# Patient Record
Sex: MALE | Race: WHITE | NOT HISPANIC OR LATINO | Employment: OTHER | URBAN - METROPOLITAN AREA
[De-identification: names, ages, dates, MRNs, and addresses within clinical notes are randomized per-mention and may not be internally consistent; named-entity substitution may affect disease eponyms.]

---

## 2017-01-06 ENCOUNTER — OFFICE VISIT (OUTPATIENT)
Dept: FAMILY MEDICINE CLINIC | Facility: CLINIC | Age: 77
End: 2017-01-06

## 2017-01-06 VITALS
DIASTOLIC BLOOD PRESSURE: 78 MMHG | OXYGEN SATURATION: 97 % | WEIGHT: 209 LBS | HEIGHT: 69 IN | SYSTOLIC BLOOD PRESSURE: 142 MMHG | HEART RATE: 71 BPM | BODY MASS INDEX: 30.96 KG/M2

## 2017-01-06 DIAGNOSIS — N40.1 BENIGN PROSTATIC HYPERPLASIA WITH LOWER URINARY TRACT SYMPTOMS, UNSPECIFIED MORPHOLOGY: ICD-10-CM

## 2017-01-06 DIAGNOSIS — I10 BENIGN ESSENTIAL HTN: Primary | ICD-10-CM

## 2017-01-06 PROCEDURE — 99213 OFFICE O/P EST LOW 20 MIN: CPT | Performed by: FAMILY MEDICINE

## 2017-01-06 RX ORDER — AMLODIPINE BESYLATE AND BENAZEPRIL HYDROCHLORIDE 10; 20 MG/1; MG/1
1 CAPSULE ORAL DAILY
Qty: 90 CAPSULE | Refills: 1 | Status: SHIPPED | OUTPATIENT
Start: 2017-01-06 | End: 2017-07-13 | Stop reason: SDUPTHER

## 2017-01-06 RX ORDER — CHLORTHALIDONE 25 MG/1
25 TABLET ORAL DAILY
Qty: 90 TABLET | Refills: 1 | Status: SHIPPED | OUTPATIENT
Start: 2017-01-06 | End: 2017-07-13 | Stop reason: SDUPTHER

## 2017-01-06 RX ORDER — FINASTERIDE 5 MG/1
5 TABLET, FILM COATED ORAL DAILY
Qty: 90 TABLET | Refills: 1 | Status: SHIPPED | OUTPATIENT
Start: 2017-01-06 | End: 2017-07-13 | Stop reason: SDUPTHER

## 2017-01-06 NOTE — MR AVS SNAPSHOT
Imer Jordan   1/6/2017 9:20 AM   Office Visit    Provider:  Ann Marie Brizuela MD   Department:  [facility] PRIMARY CARE   Dept Phone:  [phone]                Your Full Care Plan              Today's Medication Changes          These changes are accurate as of: 1/6/17  9:31 AM.  If you have any questions, ask your nurse or doctor.               Medication(s)that have changed:     chlorthalidone 25 MG tablet   Commonly known as:  HYGROTON   Take 1 tablet by mouth Daily.   What changed:  See the new instructions.   Changed by:  Ann Marie Brizuela MD       finasteride 5 MG tablet   Commonly known as:  PROSCAR   Take 1 tablet by mouth Daily.   What changed:  See the new instructions.   Changed by:  Ann Marie Brizuela MD         Stop taking medication(s)listed here:     CIALIS 20 MG tablet   Generic drug:  tadalafil   Stopped by:  Ann Marie Brizuela MD                Where to Get Your Medications      These medications were sent to [facility] [address] [facility] [phone] [facility] [phone]   [address] [place] [address]     Phone:  [phone]     chlorthalidone 25 MG tablet    finasteride 5 MG tablet         These medications were sent to [address] - [phone] [facility] [phone] [place] [address]     Phone:  [phone]     amLODIPine-benazepril 10-20 MG per capsule                  Your Updated Medication List          This list is accurate as of: 1/6/17  9:31 AM.  Always use your most recent med list.                amLODIPine-benazepril 10-20 MG per capsule   Commonly known as:  LOTREL   Take 1 capsule by mouth Daily.       aspirin 81 MG chewable tablet       betamethasone dipropionate 0.05 % lotion   Commonly known as:  DIPROLENE       chlorthalidone 25 MG tablet   Commonly known as:  HYGROTON   Take 1 tablet by  mouth Daily.       finasteride 5 MG tablet   Commonly known as:  PROSCAR   Take 1 tablet by mouth Daily.       GLUCOSAMINE MSM COMPLEX tablet       mometasone 0.1 % cream   Commonly known as:  ELOCON   Apply topically daily.       Omega-3 Krill Oil 1000 MG capsule               You Were Diagnosed With        Codes Comments    Benign essential HTN    -  Primary ICD-10-CM: I10  ICD-9-CM: 401.1     Benign prostatic hyperplasia with lower urinary tract symptoms, unspecified morphology     ICD-10-CM: N40.1  ICD-9-CM: 600.01       Instructions     None    Patient Instructions History      MyChart Signup     Our records indicate that you have an active [facility] account.    You can view your After Visit Summary by going to [employer] and logging in with your [employer]a username and password.  If you don't have a [employer]a username and password but a parent or guardian has access to your record, the parent or guardian should login with their own Arynga username and password and access your record to view the After Visit Summary.    If you have questions, you can email [email or web address] or call [phone] to talk to our [employer]ry[employer] staff.  Remember, [employer] is NOT to be used for urgent needs.  For medical emergencies, dial 911.               Other Info from Your Visit           Your Appointments     Jul 10, 2017  8:00 AM EDT   [facility] PR[facility] (--)    [address] [facility] [id number] [phone]            Jul 10, 2017  8:15 AM EDT   Office Visit with Ann Marie Brizuela MD   [facility] PRIMARY CARE (--)    [address] [facility] [id number] [phone]           Arrive 15 minutes prior to appointment.              Allergies     Niacin      Spironolactone  Nausea Only    Other reaction(s): Headache      Reason for Visit     Hypertension 6 month f/u    Med Refill           Vital Signs     Blood  "Pressure Pulse Height Weight Oxygen Saturation Body Mass Index    142/78 71 69\" (175.3 cm) 209 lb (94.8 kg) 97% 30.86 kg/m2    Smoking Status                   Current Some Day Smoker           Problems and Diagnoses Noted     Benign essential HTN    Benign enlargement of prostate      No Longer an Issue     Aneurysm      "

## 2017-01-06 NOTE — PROGRESS NOTES
Vitals:    01/06/17 0903   BP: 142/78   Pulse: 71   SpO2: 97%   Body mass index is 30.86 kg/(m^2).    Social History   Substance Use Topics   • Smoking status: Current Some Day Smoker     Types: Cigars   • Smokeless tobacco: None      Comment: occ   • Alcohol use None       Subjective   Imer Jordan is a 76 y.o. male  is here for follow-up of hypertension. He is exercising walking and using the elliptical and is adherent to a low-salt diet. Patient does not check his blood pressure.Patient denies chest pain and dyspnea. Cardiovascular risk factors: advanced age (older than 55 for men, 65 for women), hypertension, male gender, obesity (BMI >= 30 kg/m2) and sedentary lifestyle. Use of agents associated with hypertension: none. History of target organ damage: none and but has a thoracic aneurysm. He is  compliant with meds.     History of Present Illness     The following portions of the patient's history were reviewed and updated as appropriate: allergies, current medications, past social history and problem list.    Review of Systems   Constitutional: Negative for activity change, appetite change, chills, fatigue, fever and unexpected weight change.   HENT: Negative for congestion, ear pain, hearing loss, mouth sores, nosebleeds, rhinorrhea and sore throat.    Eyes: Negative for pain and visual disturbance.   Respiratory: Negative for cough, shortness of breath and wheezing.    Cardiovascular: Negative for chest pain, palpitations and leg swelling.   Gastrointestinal: Negative for abdominal distention, abdominal pain, blood in stool, constipation, diarrhea, nausea and vomiting.   Endocrine: Negative for cold intolerance and heat intolerance.   Genitourinary: Negative for difficulty urinating, discharge, dysuria, frequency, hematuria and urgency.   Musculoskeletal: Negative for back pain and joint swelling.   Skin: Negative for rash and wound.   Neurological: Negative for dizziness, weakness, numbness and headaches.    Hematological: Does not bruise/bleed easily.   Psychiatric/Behavioral: Negative for confusion, dysphoric mood, sleep disturbance and suicidal ideas. The patient is not nervous/anxious.        Objective   Physical Exam   Constitutional: He is oriented to person, place, and time. Vital signs are normal. He appears well-developed and well-nourished. No distress.   HENT:   Head: Normocephalic.   Cardiovascular: Normal rate and regular rhythm.    Murmur heard.   Decrescendo systolic murmur is present with a grade of 5/6   Over the entire precordium   Pulmonary/Chest: Effort normal and breath sounds normal.   Neurological: He is alert and oriented to person, place, and time. Gait normal.   Skin:        Psychiatric: He has a normal mood and affect. His behavior is normal. Judgment and thought content normal.   Vitals reviewed.      Assessment/Plan   Problem List Items Addressed This Visit        Cardiovascular and Mediastinum    Benign essential HTN - Primary     Hypertension is borderline. He should monitor his BP.          Relevant Medications    amLODIPine-benazepril (LOTREL) 10-20 MG per capsule    chlorthalidone (HYGROTON) 25 MG tablet       Genitourinary    Benign prostatic hypertrophy    Relevant Medications    finasteride (PROSCAR) 5 MG tablet        Immunizations are updated today with a Shingle vaccine, referred to retail clinic, also tdap at the pharmacy.

## 2017-04-27 RX ORDER — BETAMETHASONE DIPROPIONATE 0.5 MG/G
LOTION TOPICAL
Qty: 60 ML | Refills: 2 | Status: SHIPPED | OUTPATIENT
Start: 2017-04-27 | End: 2019-01-18 | Stop reason: SDUPTHER

## 2017-07-13 ENCOUNTER — OFFICE VISIT (OUTPATIENT)
Dept: FAMILY MEDICINE CLINIC | Facility: CLINIC | Age: 77
End: 2017-07-13

## 2017-07-13 VITALS
HEART RATE: 60 BPM | BODY MASS INDEX: 30.66 KG/M2 | HEIGHT: 69 IN | DIASTOLIC BLOOD PRESSURE: 64 MMHG | OXYGEN SATURATION: 94 % | SYSTOLIC BLOOD PRESSURE: 138 MMHG | WEIGHT: 207 LBS

## 2017-07-13 DIAGNOSIS — Z79.899 HIGH RISK MEDICATION USE: ICD-10-CM

## 2017-07-13 DIAGNOSIS — I10 BENIGN ESSENTIAL HTN: Primary | ICD-10-CM

## 2017-07-13 DIAGNOSIS — R73.9 HYPERGLYCEMIA: ICD-10-CM

## 2017-07-13 DIAGNOSIS — N40.1 BENIGN PROSTATIC HYPERPLASIA WITH LOWER URINARY TRACT SYMPTOMS, UNSPECIFIED MORPHOLOGY: ICD-10-CM

## 2017-07-13 DIAGNOSIS — E78.2 MIXED HYPERLIPIDEMIA: ICD-10-CM

## 2017-07-13 LAB
ALBUMIN SERPL-MCNC: 4.5 G/DL (ref 3.5–5.2)
ALBUMIN/GLOB SERPL: 1.8 G/DL
ALP SERPL-CCNC: 60 U/L (ref 39–117)
ALT SERPL-CCNC: 22 U/L (ref 1–41)
AST SERPL-CCNC: 27 U/L (ref 1–40)
BASOPHILS # BLD AUTO: 0.01 10*3/MM3 (ref 0–0.2)
BASOPHILS NFR BLD AUTO: 0.2 % (ref 0–1.5)
BILIRUB SERPL-MCNC: 0.4 MG/DL (ref 0.1–1.2)
BUN SERPL-MCNC: 21 MG/DL (ref 8–23)
BUN/CREAT SERPL: 17.4 (ref 7–25)
CALCIUM SERPL-MCNC: 9.2 MG/DL (ref 8.6–10.5)
CHLORIDE SERPL-SCNC: 100 MMOL/L (ref 98–107)
CHOLEST SERPL-MCNC: 186 MG/DL (ref 0–200)
CO2 SERPL-SCNC: 25.7 MMOL/L (ref 22–29)
CREAT SERPL-MCNC: 1.21 MG/DL (ref 0.76–1.27)
EOSINOPHIL # BLD AUTO: 0.09 10*3/MM3 (ref 0–0.7)
EOSINOPHIL NFR BLD AUTO: 1.6 % (ref 0.3–6.2)
ERYTHROCYTE [DISTWIDTH] IN BLOOD BY AUTOMATED COUNT: 13.7 % (ref 11.5–14.5)
GLOBULIN SER CALC-MCNC: 2.5 GM/DL
GLUCOSE SERPL-MCNC: 106 MG/DL (ref 65–99)
HBA1C MFR BLD: 5.63 % (ref 4.8–5.6)
HCT VFR BLD AUTO: 44.8 % (ref 40.4–52.2)
HDLC SERPL-MCNC: 32 MG/DL (ref 40–60)
HGB BLD-MCNC: 14.8 G/DL (ref 13.7–17.6)
IMM GRANULOCYTES # BLD: 0.02 10*3/MM3 (ref 0–0.03)
IMM GRANULOCYTES NFR BLD: 0.4 % (ref 0–0.5)
LDLC SERPL CALC-MCNC: 130 MG/DL (ref 0–100)
LDLC/HDLC SERPL: 4.05 {RATIO}
LYMPHOCYTES # BLD AUTO: 1.27 10*3/MM3 (ref 0.9–4.8)
LYMPHOCYTES NFR BLD AUTO: 23 % (ref 19.6–45.3)
MCH RBC QN AUTO: 29.4 PG (ref 27–32.7)
MCHC RBC AUTO-ENTMCNC: 33 G/DL (ref 32.6–36.4)
MCV RBC AUTO: 88.9 FL (ref 79.8–96.2)
MONOCYTES # BLD AUTO: 0.45 10*3/MM3 (ref 0.2–1.2)
MONOCYTES NFR BLD AUTO: 8.1 % (ref 5–12)
NEUTROPHILS # BLD AUTO: 3.69 10*3/MM3 (ref 1.9–8.1)
NEUTROPHILS NFR BLD AUTO: 66.7 % (ref 42.7–76)
PLATELET # BLD AUTO: 173 10*3/MM3 (ref 140–500)
POTASSIUM SERPL-SCNC: 3.8 MMOL/L (ref 3.5–5.2)
PROT SERPL-MCNC: 7 G/DL (ref 6–8.5)
PSA SERPL-MCNC: 0.83 NG/ML (ref 0–4)
RBC # BLD AUTO: 5.04 10*6/MM3 (ref 4.6–6)
SODIUM SERPL-SCNC: 143 MMOL/L (ref 136–145)
TRIGL SERPL-MCNC: 122 MG/DL (ref 0–150)
VLDLC SERPL CALC-MCNC: 24.4 MG/DL (ref 5–40)
WBC # BLD AUTO: 5.53 10*3/MM3 (ref 4.5–10.7)

## 2017-07-13 PROCEDURE — 99214 OFFICE O/P EST MOD 30 MIN: CPT | Performed by: FAMILY MEDICINE

## 2017-07-13 RX ORDER — CHLORTHALIDONE 25 MG/1
25 TABLET ORAL DAILY
Qty: 90 TABLET | Refills: 1 | Status: SHIPPED | OUTPATIENT
Start: 2017-07-13 | End: 2018-01-04 | Stop reason: SDUPTHER

## 2017-07-13 RX ORDER — AMLODIPINE BESYLATE AND BENAZEPRIL HYDROCHLORIDE 10; 20 MG/1; MG/1
1 CAPSULE ORAL DAILY
Qty: 90 CAPSULE | Refills: 1 | Status: SHIPPED | OUTPATIENT
Start: 2017-07-13 | End: 2018-01-08 | Stop reason: SDUPTHER

## 2017-07-13 RX ORDER — FINASTERIDE 5 MG/1
5 TABLET, FILM COATED ORAL DAILY
Qty: 90 TABLET | Refills: 1 | Status: SHIPPED | OUTPATIENT
Start: 2017-07-13 | End: 2018-01-04 | Stop reason: SDUPTHER

## 2017-07-13 NOTE — PROGRESS NOTES
Imer Jordan is a 76 y.o. male.  Seen 07/13/2017    Assessment/Plan   Problem List Items Addressed This Visit        Cardiovascular and Mediastinum    Benign essential HTN - Primary    Overview     [facility] 7/13/2017  BP is controlled well. He will recheck in six months. He will continue present meds.           Relevant Medications    amLODIPine-benazepril (LOTREL) 10-20 MG per capsule    chlorthalidone (HYGROTON) 25 MG tablet    Other Relevant Orders    Comprehensive Metabolic Panel    HLD (hyperlipidemia)    Overview     [facility] 7/13/2017  Labs are drawn today.           Relevant Orders    Lipid Panel With LDL / HDL Ratio       Genitourinary    Benign prostatic hypertrophy    Relevant Medications    finasteride (PROSCAR) 5 MG tablet    Other Relevant Orders    PSA       Other    Hyperglycemia    Overview     [facility] 7/13/2017  Labs are drawn today.           Relevant Orders    Hemoglobin A1c      Other Visit Diagnoses     High risk medication use        Relevant Orders    CBC & Differential             Return in about 6 months (around 1/13/2018).  Patient Instructions   Mediterranean diet: A heart-healthy eating plan  The heart-healthy Mediterranean diet is a healthy eating plan based on typical foods and recipes of Mediterranean-style cooking. Here's how to adopt the Mediterranean diet.  By [facility] Staff  If you're looking for a heart-healthy eating plan, the Mediterranean diet might be right for you.    The Mediterranean diet incorporates the basics of healthy eating -- plus a splash of flavorful olive oil and perhaps a glass of red wine -- among other components characterizing the traditional cooking style of countries bordering the [place] Sea.Most healthy diets include fruits, vegetables, fish and whole grains, and limit unhealthy fats. While these parts of a healthy diet are tried-and-true, subtle variations or differences in proportions of certain foods may make a difference in your risk of  "heart disease.    Benefits of the Mediterranean diet  Research has shown that the traditional Mediterranean diet reduces the risk of heart disease. The diet has been associated with a lower level of oxidized low-density lipoprotein (LDL) cholesterol -- the \"bad\" cholesterol that's more likely to build up deposits in your arteries. In fact, a meta-analysis of more than 1.5 million healthy adults demonstrated that following a Mediterranean diet was associated with a reduced risk of cardiovascular mortality as well as overall mortality.    The Mediterranean diet is also associated with a reduced incidence of cancer, and Parkinson's and Alzheimer's diseases. Women who eat a Mediterranean diet supplemented with extra-virgin olive oil and mixed nuts may have a reduced risk of breast cancer.For these reasons, most if not all major scientific organizations encourage healthy adults to adapt a style of eating like that of the Mediterranean diet for prevention of major chronic diseases.        Vitals:    07/13/17 0835   BP: 138/64   Pulse: 60   SpO2: 94%   Weight: 207 lb (93.9 kg)   Height: 69\" (175.3 cm)     Body mass index is 30.57 kg/(m^2).    Subjective     Chief Complaint   Patient presents with   • Hypertension   • Med Refill     Social History   Substance Use Topics   • Smoking status: Current Some Day Smoker     Types: Cigars   • Smokeless tobacco: Never Used      Comment: occ   • Alcohol use None       History of Present Illness     When he lays on his right side he can now here his regurg. Started in December. Patient is here for follow-up of hypertension. He is exercising-walking a mile a day and does his elliptical and is adherent to a low-salt diet. Patient does BP checks at home: It is well controlled when checked. and home BP readings are in the 120's/60's range. Patient denies chest pain and dyspnea. Cardiovascular risk factors: advanced age (older than 55 for men, 65 for women), dyslipidemia, hypertension, " male gender and aortic valve insuff. Use of agents associated with hypertension: none. History of target organ damage: none. He is compliant with meds.     The following portions of the patient's history were reviewed and updated as appropriate:PMHroutine: Social history , Allergies, Current Medications, Active Problem List and Health Maintenance    Review of Systems   Constitutional: Negative for activity change, appetite change, chills, fatigue, fever and unexpected weight change.   HENT: Negative for congestion, ear pain, hearing loss, mouth sores, nosebleeds, rhinorrhea and sore throat.    Eyes: Negative for pain and visual disturbance.   Respiratory: Negative for cough, shortness of breath and wheezing.    Cardiovascular: Negative for chest pain, palpitations and leg swelling.   Gastrointestinal: Negative for abdominal distention, abdominal pain, blood in stool, constipation, diarrhea, nausea and vomiting.   Endocrine: Negative for cold intolerance and heat intolerance.   Genitourinary: Negative for difficulty urinating, discharge, dysuria, frequency, hematuria and urgency.   Musculoskeletal: Negative for back pain and joint swelling.   Skin: Negative for rash and wound.   Neurological: Negative for dizziness, weakness, numbness and headaches.   Hematological: Does not bruise/bleed easily.   Psychiatric/Behavioral: Negative for confusion, dysphoric mood, sleep disturbance and suicidal ideas. The patient is not nervous/anxious.        Objective   Physical Exam   Constitutional: He is oriented to person, place, and time. Vital signs are normal. He appears well-developed and well-nourished. No distress.   HENT:   Head: Normocephalic.   Cardiovascular: Normal rate and regular rhythm.    Murmur (5/6 holosystolic musical murmur loudest in the LUSB) heard.  Pulmonary/Chest: Effort normal and breath sounds normal.   Neurological: He is alert and oriented to person, place, and time. Gait normal.   Psychiatric: He has a  normal mood and affect. His behavior is normal. Judgment and thought content normal.   Vitals reviewed.    Reviewed Data: Will get labs today Disc last years.   No visits with results within 1 Month(s) from this visit.  Latest known visit with results is:    Office Visit on 07/07/2016   Component Date Value Ref Range Status   • Glucose 07/07/2016 101* 65 - 99 mg/dL Final    Comment: The MDRD GFR formula is only valid for adults with stable  renal function between ages 18 and 70.     • BUN 07/07/2016 16  8 - 23 mg/dL Final   • Creatinine 07/07/2016 1.14  0.76 - 1.27 mg/dL Final   • eGFR Non  Am 07/07/2016 63  >60 mL/min/1.73 Final   • eGFR African Am 07/07/2016 76  >60 mL/min/1.73 Final   • BUN/Creatinine Ratio 07/07/2016 14.0  7.0 - 25.0 Final   • Sodium 07/07/2016 140  136 - 145 mmol/L Final   • Potassium 07/07/2016 4.3  3.5 - 5.2 mmol/L Final   • Chloride 07/07/2016 99  98 - 107 mmol/L Final   • Total CO2 07/07/2016 28.7  22.0 - 29.0 mmol/L Final   • Calcium 07/07/2016 9.6  8.6 - 10.5 mg/dL Final   • Total Protein 07/07/2016 6.5  6.0 - 8.5 g/dL Final   • Albumin 07/07/2016 4.70  3.50 - 5.20 g/dL Final   • Globulin 07/07/2016 1.8  gm/dL Final   • A/G Ratio 07/07/2016 2.6  g/dL Final   • Total Bilirubin 07/07/2016 0.4  0.1 - 1.2 mg/dL Final   • Alkaline Phosphatase 07/07/2016 62  39 - 117 U/L Final   • AST (SGOT) 07/07/2016 23  1 - 40 U/L Final   • ALT (SGPT) 07/07/2016 24  1 - 41 U/L Final   • WBC 07/07/2016 5.68  4.50 - 10.70 10*3/mm3 Final   • RBC 07/07/2016 5.14  4.60 - 6.00 10*6/mm3 Final   • Hemoglobin 07/07/2016 14.8  13.7 - 17.6 g/dL Final   • Hematocrit 07/07/2016 44.4  40.4 - 52.2 % Final   • MCV 07/07/2016 86.4  79.8 - 96.2 fL Final   • MCH 07/07/2016 28.8  27.0 - 32.7 pg Final   • MCHC 07/07/2016 33.3  32.6 - 36.4 g/dL Final   • RDW 07/07/2016 13.4  11.5 - 14.5 % Final   • Platelets 07/07/2016 198  140 - 500 10*3/mm3 Final   • Neutrophil Rel % 07/07/2016 66.6  42.7 - 76.0 % Final   • Lymphocyte  Rel % 07/07/2016 21.0  19.6 - 45.3 % Final   • Monocyte Rel % 07/07/2016 10.6  5.0 - 12.0 % Final   • Eosinophil Rel % 07/07/2016 1.4  0.3 - 6.2 % Final   • Basophil Rel % 07/07/2016 0.4  0.0 - 1.5 % Final   • Neutrophils Absolute 07/07/2016 3.79  1.90 - 8.10 10*3/mm3 Final   • Lymphocytes Absolute 07/07/2016 1.19  0.90 - 4.80 10*3/mm3 Final   • Monocytes Absolute 07/07/2016 0.60  0.20 - 1.20 10*3/mm3 Final   • Eosinophils Absolute 07/07/2016 0.08  0.00 - 0.70 10*3/mm3 Final   • Basophils Absolute 07/07/2016 0.02  0.00 - 0.20 10*3/mm3 Final   • Immature Granulocyte Rel % 07/07/2016 0.0  0.0 - 0.5 % Final   • Immature Grans Absolute 07/07/2016 0.00  0.00 - 0.03 10*3/mm3 Final   • Total Cholesterol 07/07/2016 187  0 - 200 mg/dL Final   • Triglycerides 07/07/2016 187* 0 - 150 mg/dL Final   • HDL Cholesterol 07/07/2016 27* 40 - 60 mg/dL Final   • VLDL Cholesterol 07/07/2016 37.4  5 - 40 mg/dL Final   • LDL Cholesterol  07/07/2016 123* 0 - 100 mg/dL Final   • LDL/HDL Ratio 07/07/2016 4.54   Final

## 2017-07-13 NOTE — PATIENT INSTRUCTIONS
"Mediterranean diet: A heart-healthy eating plan  The heart-healthy Mediterranean diet is a healthy eating plan based on typical foods and recipes of Mediterranean-style cooking. Here's how to adopt the Mediterranean diet.  By [facility] Staff  If you're looking for a heart-healthy eating plan, the Mediterranean diet might be right for you.    The Mediterranean diet incorporates the basics of healthy eating -- plus a splash of flavorful olive oil and perhaps a glass of red wine -- among other components characterizing the traditional cooking style of countries bordering the [place] Sea.Most healthy diets include fruits, vegetables, fish and whole grains, and limit unhealthy fats. While these parts of a healthy diet are tried-and-true, subtle variations or differences in proportions of certain foods may make a difference in your risk of heart disease.    Benefits of the Mediterranean diet  Research has shown that the traditional Mediterranean diet reduces the risk of heart disease. The diet has been associated with a lower level of oxidized low-density lipoprotein (LDL) cholesterol -- the \"bad\" cholesterol that's more likely to build up deposits in your arteries. In fact, a meta-analysis of more than 1.5 million healthy adults demonstrated that following a Mediterranean diet was associated with a reduced risk of cardiovascular mortality as well as overall mortality.    The Mediterranean diet is also associated with a reduced incidence of cancer, and Parkinson's and Alzheimer's diseases. Women who eat a Mediterranean diet supplemented with extra-virgin olive oil and mixed nuts may have a reduced risk of breast cancer.For these reasons, most if not all major scientific organizations encourage healthy adults to adapt a style of eating like that of the Mediterranean diet for prevention of major chronic diseases.    Key components of the Mediterranean diet    The Mediterranean diet emphasizes:     Eating primarily " "plant-based foods, such as fruits and vegetables, whole grains, legumes and nuts   Replacing butter with healthy fats such as olive oil and canola oil   Using herbs and spices instead of salt to flavor foods   Limiting red meat to no more than a few times a month   Eating fish and poultry at least twice a week   Enjoying meals with family and friends   Drinking red wine in moderation (optional)   Getting plenty of exercise   Fruits, vegetables, nuts and grains    The Mediterranean diet traditionally includes fruits, vegetables, pasta and rice. For example, residents of [facility] eat very little red meat and average nine servings a day of antioxidant-rich fruits and vegetables.  Grains in the Mediterranean region are typically whole grain and usually contain very few unhealthy trans fats, and bread is an important part of the diet there. However, throughout the Mediterranean region, bread is eaten plain or dipped in olive oil -- not eaten with butter or margarines, which contain saturated or trans fats.  Nuts are another part of a healthy Mediterranean diet. Nuts are high in fat (approximately 80 percent of their calories come from fat), but most of the fat is not saturated. Because nuts are high in calories, they should not be eaten in large amounts -- generally no more than a handful a day. Avoid candied or honey-roasted and heavily salted nuts.    Healthy fats  The focus of the Mediterranean diet isn't on limiting total fat consumption, but rather to make maradiaga choices about the types of fat you eat. The Mediterranean diet discourages saturated fats and hydrogenated oils (trans fats), both of which contribute to heart disease.The Mediterranean diet features olive oil as the primary source of fat. Olive oil provides monounsaturated fat -- a type of fat that can help reduce LDL cholesterol levels when used in place of saturated or trans fats.\"Extra-virgin\" and \"virgin\" olive oils -- the least processed forms -- also " contain the highest levels of the protective plant compounds that provide antioxidant effects.    Monounsaturated fats and polyunsaturated fats, such as canola oil and some nuts, contain the beneficial linolenic acid (a type of omega-3 fatty acid). Omega-3 fatty acids lower triglycerides, decrease blood clotting, are associated with decreased sudden heart attack, improve the health of your blood vessels, and help moderate blood pressure. Fatty fish -- such as mackerel, lake trout, herring, sardines, albacore tuna and salmon -- are rich sources of omega-3 fatty acids. Fish is eaten on a regular basis in the Mediterranean diet.    Wine    The health effects of alcohol have been debated for many years, and some doctors are reluctant to encourage alcohol consumption because of the health consequences of excessive drinking.However, alcohol -- in moderation -- has been associated with a reduced risk of heart disease in some research studies.    The Mediterranean diet typically includes a moderate amount of wine. This means no more than 5 ounces (148 milliliters) of wine daily for women (or men over age 65), and no more than 10 ounces (296 milliliters) of wine daily for men under age 65. If you're unable to limit your alcohol intake to the amounts defined above, if you have a personal or family history of alcohol abuse, or if you have heart or liver disease, refrain from drinking wine or any other alcohol.    Putting it all together    The Mediterranean diet is a delicious and healthy way to eat. Many people who switch to this style of eating say they'll never eat any other way. Here are some specific steps to get you started:    Eat your veggies and fruits -- and switch to whole grains. An abundance and variety of plant foods should make up the majority of your meals. Strive for seven to 10 servings a day of veggies and fruits. Switch to whole-grain bread and cereal, and begin to eat more whole-gain rice and pasta  products.  Go nuts. Keep almonds, cashews, pistachios and walnuts on hand for a quick snack. Choose natural peanut butter, rather than the kind with hydrogenated fat added. Try tahini (blended sesame seeds) as a dip or spread for bread.  Pass on the butter. Try olive or canola oil as a healthy replacement for butter or margarine. Use it in cooking. Dip bread in flavored olive oil or lightly spread it on whole-grain bread for a tasty alternative to butter. Or try tahini as a dip or spread.  Spice it up. Herbs and spices make food tasty and are also rich in health-promoting substances. Season your meals with herbs and spices rather than salt.  Go fish. Eat fish once or twice a week. Fresh or water-packed tuna, salmon, trout, mackerel and herring are healthy choices. Grilled fish tastes good and requires little cleanup. Avoid fried fish, unless it's sauteed in a small amount of canola oil.  Rein in the red meat. Substitute fish and poultry for red meat. When eaten, make sure it's lean and keep portions small (about the size of a deck of cards). Also avoid sausage, nolan and other high-fat meats.  Choose low-fat dairy. Limit higher fat dairy products such as whole or 2 percent milk, cheese and ice cream. Switch to skim milk, fat-free yogurt and low-fat cheese.  Raise a glass to healthy eating. If it's OK with your doctor, have a glass of wine at dinner. If you don't drink alcohol, you don't need to start. Drinking purple grape juice may be an alternative to wine.

## 2018-01-04 DIAGNOSIS — I10 BENIGN ESSENTIAL HTN: ICD-10-CM

## 2018-01-04 DIAGNOSIS — N40.1 BENIGN PROSTATIC HYPERPLASIA WITH LOWER URINARY TRACT SYMPTOMS: ICD-10-CM

## 2018-01-04 RX ORDER — FINASTERIDE 5 MG/1
TABLET, FILM COATED ORAL
Qty: 90 TABLET | Refills: 0 | Status: SHIPPED | OUTPATIENT
Start: 2018-01-04 | End: 2018-01-15 | Stop reason: SDUPTHER

## 2018-01-04 RX ORDER — CHLORTHALIDONE 25 MG/1
TABLET ORAL
Qty: 90 TABLET | Refills: 0 | Status: SHIPPED | OUTPATIENT
Start: 2018-01-04 | End: 2018-01-15 | Stop reason: SDUPTHER

## 2018-01-08 DIAGNOSIS — I10 BENIGN ESSENTIAL HTN: ICD-10-CM

## 2018-01-08 RX ORDER — AMLODIPINE BESYLATE AND BENAZEPRIL HYDROCHLORIDE 10; 20 MG/1; MG/1
1 CAPSULE ORAL DAILY
Qty: 14 CAPSULE | Refills: 0 | Status: SHIPPED | OUTPATIENT
Start: 2018-01-08 | End: 2018-01-15 | Stop reason: SDUPTHER

## 2018-01-15 ENCOUNTER — OFFICE VISIT (OUTPATIENT)
Dept: FAMILY MEDICINE CLINIC | Facility: CLINIC | Age: 78
End: 2018-01-15

## 2018-01-15 VITALS
HEIGHT: 69 IN | SYSTOLIC BLOOD PRESSURE: 138 MMHG | DIASTOLIC BLOOD PRESSURE: 70 MMHG | WEIGHT: 202 LBS | OXYGEN SATURATION: 98 % | HEART RATE: 79 BPM | BODY MASS INDEX: 29.92 KG/M2

## 2018-01-15 DIAGNOSIS — I10 BENIGN ESSENTIAL HTN: ICD-10-CM

## 2018-01-15 DIAGNOSIS — N40.1 BENIGN PROSTATIC HYPERPLASIA WITH LOWER URINARY TRACT SYMPTOMS, SYMPTOM DETAILS UNSPECIFIED: ICD-10-CM

## 2018-01-15 PROBLEM — M54.16 LEFT LUMBAR RADICULOPATHY: Status: ACTIVE | Noted: 2017-09-13

## 2018-01-15 PROCEDURE — 99213 OFFICE O/P EST LOW 20 MIN: CPT | Performed by: FAMILY MEDICINE

## 2018-01-15 RX ORDER — FINASTERIDE 5 MG/1
5 TABLET, FILM COATED ORAL DAILY
Qty: 90 TABLET | Refills: 1 | Status: SHIPPED | OUTPATIENT
Start: 2018-01-15 | End: 2018-07-16 | Stop reason: SDUPTHER

## 2018-01-15 RX ORDER — CHLORTHALIDONE 25 MG/1
25 TABLET ORAL DAILY
Qty: 90 TABLET | Refills: 1 | Status: SHIPPED | OUTPATIENT
Start: 2018-01-15 | End: 2018-04-10 | Stop reason: ALTCHOICE

## 2018-01-15 RX ORDER — MELOXICAM 15 MG/1
TABLET ORAL
COMMUNITY
Start: 2018-01-05 | End: 2018-07-16

## 2018-01-15 RX ORDER — AMLODIPINE BESYLATE AND BENAZEPRIL HYDROCHLORIDE 10; 20 MG/1; MG/1
1 CAPSULE ORAL DAILY
Qty: 90 CAPSULE | Refills: 1 | Status: SHIPPED | OUTPATIENT
Start: 2018-01-15 | End: 2018-07-16 | Stop reason: SDUPTHER

## 2018-01-15 NOTE — PROGRESS NOTES
Imer Jordan is a 77 y.o. male.      Assessment/Plan   Problem List Items Addressed This Visit        Cardiovascular and Mediastinum    Benign essential HTN    [facility]     [facility]  BP is getting better control. He is monitoring it. Gerri 7/13/2017  BP is controlled well. He will recheck in six months. He will continue present meds.           Relevant Medications    amLODIPine-benazepril (LOTREL) 10-20 MG per capsule    chlorthalidone (HYGROTON) 25 MG tablet       Genitourinary    Benign prostatic hypertrophy    Relevant Medications    finasteride (PROSCAR) 5 MG tablet             Return in about 6 months (around 7/15/2018).  There are no Patient Instructions on file for this visit.    Chief Complaint   Patient presents with   • Hypertension   • Med Refill     Social History   Substance Use Topics   • Smoking status: Current Some Day Smoker     Types: Cigars   • Smokeless tobacco: Never Used      Comment: occ   • Alcohol use None       History of Present Illness     He's been using too much salt, he's been using a different generic lately with his BP med, he's been able to use the elliptical now, he was using a lot of ibuprofen for his knee, now on meloxicam. He did get his BP med from a different . He would like me to send his three meds to [facility] today and see which generics they have. His systolics have been up to as high as 170. He feels like they are trending down with exercise and new med and reducing his salt intake.     He will be back in [facility] at the end of January to check his Aortic aneurysm.     The following portions of the patient's history were reviewed and updated as appropriate:PMHroutine: Social history , Past Medical History, Allergies, Current Medications, Active Problem List and Health Maintenance    Review of Systems   Constitutional: Negative for activity change, appetite change, chills, fatigue, fever and unexpected weight change.   HENT: Negative for  "congestion, ear pain, hearing loss, mouth sores, nosebleeds, rhinorrhea and sore throat.    Eyes: Negative for pain and visual disturbance.   Respiratory: Negative for cough, shortness of breath and wheezing.    Cardiovascular: Negative for chest pain, palpitations and leg swelling.   Gastrointestinal: Negative for abdominal distention, abdominal pain, blood in stool, constipation, diarrhea, nausea and vomiting.   Endocrine: Negative for cold intolerance and heat intolerance.   Genitourinary: Negative for difficulty urinating, discharge, dysuria, frequency, hematuria and urgency.   Musculoskeletal: Negative for back pain and joint swelling.   Skin: Negative for rash and wound.   Neurological: Negative for dizziness, weakness, numbness and headaches.   Hematological: Does not bruise/bleed easily.   Psychiatric/Behavioral: Negative for confusion, dysphoric mood, sleep disturbance and suicidal ideas. The patient is not nervous/anxious.        Objective   Vitals:    01/15/18 0811   BP: 138/70   Pulse: 79   SpO2: 98%   Weight: 91.6 kg (202 lb)   Height: 175.3 cm (69.02\")     Body mass index is 29.82 kg/(m^2).  Physical Exam   Constitutional: He is oriented to person, place, and time. Vital signs are normal. He appears well-developed and well-nourished. No distress.   HENT:   Head: Normocephalic.   Cardiovascular: Normal rate and regular rhythm.    Murmur (2/6 blowing crescendo murmur in LUSB) heard.  Pulmonary/Chest: Effort normal and breath sounds normal.   Neurological: He is alert and oriented to person, place, and time. Gait normal.   Psychiatric: He has a normal mood and affect. His behavior is normal. Judgment and thought content normal.   Vitals reviewed.    Reviewed Data:        "

## 2018-04-10 ENCOUNTER — TELEPHONE (OUTPATIENT)
Dept: FAMILY MEDICINE CLINIC | Facility: CLINIC | Age: 78
End: 2018-04-10

## 2018-04-10 NOTE — TELEPHONE ENCOUNTER
Had surgery at [facility] 3/22/18. Chlorthalidone stopped due to having elevated creatinine level.    Patient inquiring about if you would like for him to schedule appt or what do you recommend?    I will contact [facility] to have them fax his lab results.

## 2018-04-13 ENCOUNTER — OFFICE VISIT (OUTPATIENT)
Dept: FAMILY MEDICINE CLINIC | Facility: CLINIC | Age: 78
End: 2018-04-13

## 2018-04-13 VITALS
SYSTOLIC BLOOD PRESSURE: 126 MMHG | TEMPERATURE: 97.9 F | HEART RATE: 65 BPM | DIASTOLIC BLOOD PRESSURE: 50 MMHG | HEIGHT: 69 IN | WEIGHT: 197 LBS | BODY MASS INDEX: 29.18 KG/M2

## 2018-04-13 DIAGNOSIS — N28.9 MILD RENAL INSUFFICIENCY: ICD-10-CM

## 2018-04-13 DIAGNOSIS — I10 BENIGN ESSENTIAL HTN: Primary | ICD-10-CM

## 2018-04-13 DIAGNOSIS — I71.40 ABDOMINAL AORTIC ANEURYSM (AAA) WITHOUT RUPTURE (HCC): ICD-10-CM

## 2018-04-13 PROBLEM — N17.9 AKI (ACUTE KIDNEY INJURY): Status: ACTIVE | Noted: 2018-03-28

## 2018-04-13 PROBLEM — I72.3 ILIAC ANEURYSM: Status: ACTIVE | Noted: 2018-02-12

## 2018-04-13 PROCEDURE — 99213 OFFICE O/P EST LOW 20 MIN: CPT | Performed by: FAMILY MEDICINE

## 2018-04-13 NOTE — PROGRESS NOTES
Problem List Items Addressed This Visit        Cardiovascular and Mediastinum    AAA (abdominal aortic aneurysm)    Overview     Overview:   3/27/2018 Procedure(s):Open juxtarenal AAA repair (Hemashield gold 18x9 bifurcated graft) sewn distally to bilateral hypogastric arteries with re-implantation of bilateral external iliac arteries, re-implantation of SUSSY and left accessory renal artery  WARNER AAA: Open AAA  ANNETTE   Renal/Visceral Ischemic time: 26 minutes  Clamp Position: Above both renals   Type of Graft: - Bifurcated: Right - patent and Left: - patent   Hypogastric Status: Right - Patent and Left - Patent  Other Bypass: No  SUSSY at completion: reimplanted accessory renal to L limb    Midline incision c/d/i, mild distention, suppository today start po         Current Assessment & Plan     [facility] 4/13/2018  Status surgical procedure two weeks ago -- wound is healing nicely.          Benign essential HTN - Primary    Current Assessment & Plan     [facility] 4/13/2018  BP is controlled well. He will recheck in six months. He will continue present meds without the diuretic. Will monitor, he may be able to reduce other meds as well.            Genitourinary    Mild renal insufficiency    Overview     OVERVIEW:  Noted 4/13/2018 at [facility] during AAA surgery         Current Assessment & Plan     [facility] 4/13/2018  Labs are drawn today.           Relevant Orders    Basic Metabolic Panel      Other Visit Diagnoses    None.            Return for Next scheduled follow up for routine problems.  Patient Instructions   Monitor BP at home. Let me know if staying consistantly under 120/70.     Imer Jordan is a 77 y.o. male being seen in our office today for Med Management (chlorthalidone stopped by [facility])                 He  reports that he has been smoking Cigars.  He has never used smokeless tobacco.             HPI Had surgery at [facility] and they stopped his BP med --the chlorthalidone was  stopped because his Cr jeanie a bit (had a aneurysm repair which included the left renal artery). It went to 1.73 and was 1.6 on d/c from the hospital. He has not been checking his BP at home. It is low here today.              The following portions of the patient's history were reviewed and updated as appropriate:PMHroutine: Social history , Allergies, Current Medications, Active Problem List and Health Maintenance            Review of Systems   Constitutional: Positive for fatigue (past the ). Negative for activity change, appetite change, chills, fever and unexpected weight change.   HENT: Negative for congestion, ear pain, hearing loss, mouth sores, nosebleeds, rhinorrhea and sore throat.    Eyes: Negative for pain and visual disturbance.   Respiratory: Negative for cough, shortness of breath and wheezing.    Cardiovascular: Negative for chest pain, palpitations and leg swelling.   Gastrointestinal: Negative for abdominal distention, abdominal pain, blood in stool, constipation, diarrhea, nausea and vomiting.   Endocrine: Negative for cold intolerance and heat intolerance.   Genitourinary: Negative for difficulty urinating, discharge, dysuria, frequency, hematuria and urgency.   Musculoskeletal: Negative for back pain and joint swelling.   Skin: Negative for rash and wound.   Neurological: Negative for dizziness, weakness, numbness and headaches.   Hematological: Does not bruise/bleed easily.   Psychiatric/Behavioral: Negative for confusion, dysphoric mood, sleep disturbance and suicidal ideas. The patient is not nervous/anxious.                  BP Readings from Last 1 Encounters:   04/13/18 126/50     Wt Readings from Last 3 Encounters:   04/13/18 89.4 kg (197 lb)   02/14/18 89.8 kg (198 lb)   01/15/18 91.6 kg (202 lb)   Body mass index is 29.08 kg/m².                 Physical Exam   Constitutional: He appears well-developed and well-nourished.   Abdominal:   Well healing scar on the abdomen from below the  ribcage to the pubis.    Psychiatric: He has a normal mood and affect. His behavior is normal. Judgment and thought content normal.   Vitals reviewed.

## 2018-04-13 NOTE — ASSESSMENT & PLAN NOTE
Gerri 4/13/2018  BP is controlled well. He will recheck in six months. He will continue present meds without the diuretic. Will monitor, he may be able to reduce other meds as well.

## 2018-04-14 LAB
BUN SERPL-MCNC: 19 MG/DL (ref 8–27)
BUN/CREAT SERPL: 13 (ref 10–24)
CALCIUM SERPL-MCNC: 8.8 MG/DL (ref 8.6–10.2)
CHLORIDE SERPL-SCNC: 98 MMOL/L (ref 96–106)
CO2 SERPL-SCNC: 21 MMOL/L (ref 18–29)
CREAT SERPL-MCNC: 1.47 MG/DL (ref 0.76–1.27)
GFR SERPLBLD CREATININE-BSD FMLA CKD-EPI: 46 ML/MIN/1.73
GFR SERPLBLD CREATININE-BSD FMLA CKD-EPI: 53 ML/MIN/1.73
GLUCOSE SERPL-MCNC: 84 MG/DL (ref 65–99)
POTASSIUM SERPL-SCNC: 4.6 MMOL/L (ref 3.5–5.2)
SODIUM SERPL-SCNC: 138 MMOL/L (ref 134–144)

## 2018-04-14 NOTE — PROGRESS NOTES
I have reviewed all lab results which are normal or stable.  Patient  Has viewed his results on [facility].

## 2018-07-16 ENCOUNTER — OFFICE VISIT (OUTPATIENT)
Dept: FAMILY MEDICINE CLINIC | Facility: CLINIC | Age: 78
End: 2018-07-16

## 2018-07-16 VITALS
OXYGEN SATURATION: 99 % | SYSTOLIC BLOOD PRESSURE: 158 MMHG | BODY MASS INDEX: 27.99 KG/M2 | HEIGHT: 69 IN | DIASTOLIC BLOOD PRESSURE: 58 MMHG | HEART RATE: 73 BPM | RESPIRATION RATE: 16 BRPM | WEIGHT: 189 LBS

## 2018-07-16 DIAGNOSIS — R73.9 HYPERGLYCEMIA: ICD-10-CM

## 2018-07-16 DIAGNOSIS — N40.1 BENIGN PROSTATIC HYPERPLASIA WITH LOWER URINARY TRACT SYMPTOMS, SYMPTOM DETAILS UNSPECIFIED: ICD-10-CM

## 2018-07-16 DIAGNOSIS — N28.9 MILD RENAL INSUFFICIENCY: Primary | ICD-10-CM

## 2018-07-16 DIAGNOSIS — E78.2 MIXED HYPERLIPIDEMIA: ICD-10-CM

## 2018-07-16 DIAGNOSIS — I10 BENIGN ESSENTIAL HTN: ICD-10-CM

## 2018-07-16 DIAGNOSIS — Z79.899 HIGH RISK MEDICATION USE: ICD-10-CM

## 2018-07-16 PROBLEM — I72.3 ILIAC ANEURYSM (HCC): Status: RESOLVED | Noted: 2018-02-12 | Resolved: 2018-07-16

## 2018-07-16 PROBLEM — Z98.890 HISTORY OF AAA (ABDOMINAL AORTIC ANEURYSM) REPAIR: Status: ACTIVE | Noted: 2018-03-27

## 2018-07-16 PROBLEM — N17.9 AKI (ACUTE KIDNEY INJURY) (HCC): Status: RESOLVED | Noted: 2018-03-28 | Resolved: 2018-07-16

## 2018-07-16 PROCEDURE — 99214 OFFICE O/P EST MOD 30 MIN: CPT | Performed by: FAMILY MEDICINE

## 2018-07-16 PROCEDURE — G0438 PPPS, INITIAL VISIT: HCPCS | Performed by: FAMILY MEDICINE

## 2018-07-16 PROCEDURE — 96160 PT-FOCUSED HLTH RISK ASSMT: CPT | Performed by: FAMILY MEDICINE

## 2018-07-16 RX ORDER — AMLODIPINE BESYLATE AND BENAZEPRIL HYDROCHLORIDE 10; 20 MG/1; MG/1
1 CAPSULE ORAL DAILY
Qty: 90 CAPSULE | Refills: 1 | Status: SHIPPED | OUTPATIENT
Start: 2018-07-16 | End: 2019-01-18 | Stop reason: SDUPTHER

## 2018-07-16 RX ORDER — FINASTERIDE 5 MG/1
5 TABLET, FILM COATED ORAL DAILY
Qty: 90 TABLET | Refills: 1 | Status: SHIPPED | OUTPATIENT
Start: 2018-07-16 | End: 2019-01-18 | Stop reason: SDUPTHER

## 2018-07-16 NOTE — ASSESSMENT & PLAN NOTE
Gerri 7/16/2018  His Systolic is up today. He's been checking his BPs at home and they are good -- averaging 130's probably but as low as 105 and highest 149. Diastolics are fine. He will see the [facility] cardiologist next week.

## 2018-07-16 NOTE — PROGRESS NOTES
QUICK REFERENCE INFORMATION:  The ABCs of the Annual Wellness Visit    Subsequent Medicare Wellness Visit    HEALTH RISK ASSESSMENT    1940    Recent Hospitalizations:  Recently treated at the following:  Other: [facility].    Current Medical Providers:  Patient Care Team:  Ann Marie Brizuela MD as PCP - General    Smoking Status:  History   Smoking Status   • Current Some Day Smoker   • Types: Cigars   Smokeless Tobacco   • Never Used     Comment: occ       Alcohol Consumption:  History   Alcohol Use No       Depression Screen:   PHQ-2/PHQ-9 Depression Screening 4/13/2018   Little interest or pleasure in doing things 0   Feeling down, depressed, or hopeless 0   Total Score 0       Health Habits and Functional and Cognitive Screening:  Functional & Cognitive Status 7/16/2018   Do you have difficulty preparing food and eating? No   Do you have difficulty bathing yourself, getting dressed or grooming yourself? No   Do you have difficulty using the toilet? No   Do you have difficulty moving around from place to place? No   Do you have trouble with steps or getting out of a bed or a chair? No   In the past year have you fallen or experienced a near fall? No   Current Diet Well Balanced Diet   Dental Exam Not up to date   Eye Exam Not up to date   Exercise (times per week) 7 times per week   Current Exercise Activities Include Walking   Do you need help using the phone?  No   Are you deaf or do you have serious difficulty hearing?  No   Do you need help with transportation? No   Do you need help shopping? No   Do you need help preparing meals?  No   Do you need help with housework?  No   Do you need help with laundry? No   Do you need help taking your medications? No   Do you need help managing money? No   Do you ever drive or ride in a car without wearing a seat belt? No   Have you felt unusual stress, anger or loneliness in the last month? No   Who do you live with? Spouse   If you need help, do you have  trouble finding someone available to you? No   Have you been bothered in the last four weeks by sexual problems? No   Do you have difficulty concentrating, remembering or making decisions? No     Activities of Daily Living  Do you have difficulty preparing food and eating?: No  Do you have difficulty bathing yourself, getting dressed or grooming yourself?: No  Do you have difficulty using the toilet?: No  Do you have difficulty moving around from place to place?: No  Do you have trouble with steps or getting out of a bed or a chair?: No  In the past year have you fallen or experienced a near fall?: No  Instrumental Activities of Daily Living  Do you need help using the phone? : No  Are you deaf or do you have serious difficulty hearing? : No  Do you need help with transportation?: No  Do you need help shopping?: No  Do you need help preparing meals? : No  Do you need help with housework? : No  Do you need help with laundry?: No  Do you need help taking your medications?: No  Do you need help managing money?: No  Do you ever drive or ride in a car without wearing a seat belt?: No  Psychosocial Risks  Have you felt unusual stress, anger or loneliness in the last month?: No  Who do you live with?: Spouse  If you need help, do you have trouble finding someone available to you?: No  Have you been bothered in the last four weeks by sexual problems?: No  Cognitive Status  Do you have difficulty concentrating, remembering or making decisions?: No  Health Habits  Current Diet: Well Balanced Diet  Dental Exam: Not up to date  Eye Exam: Not up to date  Exercise (times per week): 7 times per week  Current Exercise Activities Include: Walking    Does the patient have evidence of cognitive impairment? No    Aspirin use counseling: Taking ASA appropriately as indicated    Recent Lab Results:  CMP:  Lab Results   Component Value Date    GLU 84 04/13/2018    BUN 19 04/13/2018    CREATININE 1.47 (H) 04/13/2018    EGFRIFNONA 46 (L)  04/13/2018    EGFRIFAFRI 53 (L) 04/13/2018    BCR 13 04/13/2018     04/13/2018    K 4.6 04/13/2018    CO2 21 04/13/2018    CALCIUM 8.8 04/13/2018    PROTENTOTREF 7.0 07/13/2017    ALBUMIN 4.50 07/13/2017    LABGLOBREF 2.5 07/13/2017    LABIL2 1.8 07/13/2017    BILITOT 0.4 07/13/2017    ALKPHOS 60 07/13/2017    AST 27 07/13/2017    ALT 22 07/13/2017     Lipid Panel:  Lab Results   Component Value Date    TRIG 122 07/13/2017    HDL 32 (L) 07/13/2017    VLDL 24.4 07/13/2017    LDLHDL 4.05 07/13/2017     HbA1c:  Lab Results   Component Value Date    HGBA1C 5.63 (H) 07/13/2017       Visual Acuity:  No exam data present    Age-appropriate Screening Schedule:  Refer to the list below for future screening recommendations based on patient's age, sex and/or medical conditions. Orders for these recommended tests are listed in the plan section. The patient has been provided with a written plan.    Health Maintenance   Topic Date Due   • ZOSTER VACCINE (2 of 3) 06/26/2017   • LIPID PANEL  07/13/2018   • INFLUENZA VACCINE  08/01/2018   • COLONOSCOPY  01/07/2023   • TDAP/TD VACCINES (2 - Td) 05/01/2027   • PNEUMOCOCCAL VACCINES (65+ LOW/MEDIUM RISK)  Completed        Subjective   History of Present Illness    Imer Jordan is a 77 y.o. male who presents for an Subsequent Wellness Visit.  HPI    The following portions of the patient's history were reviewed and updated as appropriate: allergies, current medications, past medical history, past social history, past surgical history and problem list.    Outpatient Medications Prior to Visit   Medication Sig Dispense Refill   • aspirin 81 MG chewable tablet Chew.     • betamethasone dipropionate (DIPROLENE) 0.05 % lotion APPLY TO AFFECTED AREA(S) DAILY AS DIRECTED 60 mL 2   • Glucos-MSM-C-Gs-Hxtmpi-Yfivwa (GLUCOSAMINE MSM COMPLEX) tablet Take by mouth.     • mometasone (ELOCON) 0.1 % cream Apply topically daily. 15 g 0   • Omega-3 Krill Oil 1000 MG capsule Take by mouth.     •  amLODIPine-benazepril (LOTREL) 10-20 MG per capsule Take 1 capsule by mouth Daily. 90 capsule 1   • finasteride (PROSCAR) 5 MG tablet Take 1 tablet by mouth Daily. 90 tablet 1   • meloxicam (MOBIC) 15 MG tablet        No facility-administered medications prior to visit.        Patient Active Problem List   Diagnosis   • Aortic valve insufficiency   • Benign essential HTN   • Benign prostatic hypertrophy   • ED (erectile dysfunction)   • Acid reflux   • HLD (hyperlipidemia)   • Dermatitis seborrheica   • Hyperglycemia   • Left lumbar radiculopathy   • History of AAA (abdominal aortic aneurysm) repair   • Mild renal insufficiency       Advance Care Planning:  has an advance directive - a copy HAS NOT been provided. Have asked the patient to send this to us to add to record.    Identification of Risk Factors:  Risk factors include: cardiovascular risk due to his valvular problem.    Review of Systems   Constitutional: Negative for activity change, appetite change, chills, fatigue, fever and unexpected weight change.   HENT: Negative for congestion, ear pain, hearing loss, mouth sores, nosebleeds, rhinorrhea and sore throat.    Eyes: Negative for pain and visual disturbance.   Respiratory: Negative for cough, shortness of breath and wheezing.    Cardiovascular: Negative for chest pain, palpitations and leg swelling.   Gastrointestinal: Negative for abdominal distention, abdominal pain, blood in stool, constipation, diarrhea, nausea and vomiting.   Endocrine: Negative for cold intolerance and heat intolerance.   Genitourinary: Negative for difficulty urinating, discharge, dysuria, frequency, hematuria and urgency.   Musculoskeletal: Negative for back pain and joint swelling.   Skin: Negative for rash and wound.   Neurological: Negative for dizziness, weakness, numbness and headaches.   Hematological: Does not bruise/bleed easily.   Psychiatric/Behavioral: Negative for confusion, dysphoric mood, sleep disturbance and  "suicidal ideas. The patient is not nervous/anxious.        Compared to one year ago, the patient feels his physical health is better.  Compared to one year ago, the patient feels his mental health is the same.    Objective     Physical Exam   Constitutional: He is oriented to person, place, and time. Vital signs are normal. He appears well-developed and well-nourished. No distress.   HENT:   Head: Normocephalic.   Cardiovascular: Normal rate and regular rhythm.    Murmur (2/6 blowing systolic murmur in RUSB) heard.  Pulmonary/Chest: Effort normal and breath sounds normal.   Neurological: He is alert and oriented to person, place, and time. Gait normal.   Psychiatric: He has a normal mood and affect. His behavior is normal. Judgment and thought content normal.   Vitals reviewed.      Vitals:    07/16/18 0804 07/16/18 0858   BP: 174/52 158/58   Pulse: 73    Resp: 16    SpO2: 99%    Weight: 85.7 kg (189 lb)    Height: 175.3 cm (69\")        Patient's Body mass index is 27.91 kg/m². BMI is above normal parameters. Recommendations include: no follow-up required.    Assessment/Plan   Patient Self-Management and Personalized Health Advice  The patient has been provided with information about: other than his structural cardiac issues he is quite active and healthy and preventive services including:   · Diabetes screening, see lab orders, shingrex.    Visit Diagnoses:  Problem List Items Addressed This Visit        Cardiovascular and Mediastinum    Benign essential HTN    Current Assessment & Plan     Gerri 7/16/2018  His Systolic is up today. He's been checking his BPs at home and they are good -- averaging 130's probably but as low as 105 and highest 149. Diastolics are fine. He will see the [facility] cardiologist next week.          Relevant Medications    amLODIPine-benazepril (LOTREL) 10-20 MG per capsule    Other Relevant Orders    Comprehensive metabolic panel    HLD (hyperlipidemia)    Overview     Gerri " 7/13/2017  Labs are drawn today.           Current Assessment & Plan     [facility] 7/16/2018  Labs are drawn today.           Relevant Orders    Lipid Panel With LDL/HDL Ratio       Genitourinary    Benign prostatic hypertrophy    Current Assessment & Plan     [facility] 7/16/2018  Meds refilled today         Relevant Medications    finasteride (PROSCAR) 5 MG tablet    Mild renal insufficiency - Primary    Overview     OVERVIEW:  Noted 4/13/2018 at [facility] during AAA surgery         Current Assessment & Plan     [facility] 7/16/2018  Labs are drawn today.              Other    Hyperglycemia    Overview     [facility] 7/16/2018  Labs are drawn today.           Relevant Orders    Hemoglobin A1c      Other Visit Diagnoses     High risk medication use        Relevant Orders    CBC and Differential          Orders Placed This Encounter   Procedures   • Comprehensive metabolic panel   • Lipid Panel With LDL/HDL Ratio   • Hemoglobin A1c   • CBC and Differential     Order Specific Question:   Manual Differential     Answer:   No       Outpatient Encounter Prescriptions as of 7/16/2018   Medication Sig Dispense Refill   • amLODIPine-benazepril (LOTREL) 10-20 MG per capsule Take 1 capsule by mouth Daily. 90 capsule 1   • aspirin 81 MG chewable tablet Chew.     • betamethasone dipropionate (DIPROLENE) 0.05 % lotion APPLY TO AFFECTED AREA(S) DAILY AS DIRECTED 60 mL 2   • finasteride (PROSCAR) 5 MG tablet Take 1 tablet by mouth Daily. 90 tablet 1   • Glucos-MSM-C-Vm-Ofaojg-Hpcczh (GLUCOSAMINE MSM COMPLEX) tablet Take by mouth.     • mometasone (ELOCON) 0.1 % cream Apply topically daily. 15 g 0   • Omega-3 Krill Oil 1000 MG capsule Take by mouth.     • [DISCONTINUED] amLODIPine-benazepril (LOTREL) 10-20 MG per capsule Take 1 capsule by mouth Daily. 90 capsule 1   • [DISCONTINUED] finasteride (PROSCAR) 5 MG tablet Take 1 tablet by mouth Daily. 90 tablet 1   • [DISCONTINUED] meloxicam (MOBIC) 15 MG tablet        No  facility-administered encounter medications on file as of 7/16/2018.        Reviewed use of high risk medication in the elderly: yes  Reviewed for potential of harmful drug interactions in the elderly: yes    Follow Up:  Return in about 6 months (around 1/16/2019).     An After Visit Summary and PPPS with all of these plans were given to the patient.

## 2018-07-17 LAB
ALBUMIN SERPL-MCNC: 4 G/DL (ref 3.5–4.8)
ALBUMIN/GLOB SERPL: 1.5 {RATIO} (ref 1.2–2.2)
ALP SERPL-CCNC: 80 IU/L (ref 39–117)
ALT SERPL-CCNC: 15 IU/L (ref 0–44)
AST SERPL-CCNC: 18 IU/L (ref 0–40)
BASOPHILS # BLD AUTO: 0 X10E3/UL (ref 0–0.2)
BASOPHILS NFR BLD AUTO: 0 %
BILIRUB SERPL-MCNC: 0.2 MG/DL (ref 0–1.2)
BUN SERPL-MCNC: 25 MG/DL (ref 8–27)
BUN/CREAT SERPL: 22 (ref 10–24)
CALCIUM SERPL-MCNC: 8.9 MG/DL (ref 8.6–10.2)
CHLORIDE SERPL-SCNC: 105 MMOL/L (ref 96–106)
CHOLEST SERPL-MCNC: 163 MG/DL (ref 100–199)
CO2 SERPL-SCNC: 26 MMOL/L (ref 20–29)
CREAT SERPL-MCNC: 1.12 MG/DL (ref 0.76–1.27)
EOSINOPHIL # BLD AUTO: 0.1 X10E3/UL (ref 0–0.4)
EOSINOPHIL NFR BLD AUTO: 2 %
ERYTHROCYTE [DISTWIDTH] IN BLOOD BY AUTOMATED COUNT: 14.8 % (ref 12.3–15.4)
GLOBULIN SER CALC-MCNC: 2.6 G/DL (ref 1.5–4.5)
GLUCOSE SERPL-MCNC: 97 MG/DL (ref 65–99)
HBA1C MFR BLD: 5.6 % (ref 4.8–5.6)
HCT VFR BLD AUTO: 39.7 % (ref 37.5–51)
HDLC SERPL-MCNC: 30 MG/DL
HGB BLD-MCNC: 13 G/DL (ref 13–17.7)
IMM GRANULOCYTES # BLD: 0 X10E3/UL (ref 0–0.1)
IMM GRANULOCYTES NFR BLD: 0 %
LDLC SERPL CALC-MCNC: 114 MG/DL (ref 0–99)
LDLC/HDLC SERPL: 3.8 RATIO (ref 0–3.6)
LYMPHOCYTES # BLD AUTO: 1 X10E3/UL (ref 0.7–3.1)
LYMPHOCYTES NFR BLD AUTO: 17 %
MCH RBC QN AUTO: 28.1 PG (ref 26.6–33)
MCHC RBC AUTO-ENTMCNC: 32.7 G/DL (ref 31.5–35.7)
MCV RBC AUTO: 86 FL (ref 79–97)
MONOCYTES # BLD AUTO: 0.4 X10E3/UL (ref 0.1–0.9)
MONOCYTES NFR BLD AUTO: 6 %
NEUTROPHILS # BLD AUTO: 4.4 X10E3/UL (ref 1.4–7)
NEUTROPHILS NFR BLD AUTO: 75 %
PLATELET # BLD AUTO: 176 X10E3/UL (ref 150–379)
POTASSIUM SERPL-SCNC: 4.4 MMOL/L (ref 3.5–5.2)
PROT SERPL-MCNC: 6.6 G/DL (ref 6–8.5)
RBC # BLD AUTO: 4.63 X10E6/UL (ref 4.14–5.8)
SODIUM SERPL-SCNC: 141 MMOL/L (ref 134–144)
TRIGL SERPL-MCNC: 96 MG/DL (ref 0–149)
VLDLC SERPL CALC-MCNC: 19 MG/DL (ref 5–40)
WBC # BLD AUTO: 5.9 X10E3/UL (ref 3.4–10.8)

## 2018-07-17 NOTE — PROGRESS NOTES
I have reviewed all lab results which are normal or stable.  Patient  Has viewed his results on [email or web address].

## 2019-01-18 ENCOUNTER — OFFICE VISIT (OUTPATIENT)
Dept: FAMILY MEDICINE CLINIC | Facility: CLINIC | Age: 79
End: 2019-01-18

## 2019-01-18 VITALS
SYSTOLIC BLOOD PRESSURE: 168 MMHG | DIASTOLIC BLOOD PRESSURE: 52 MMHG | BODY MASS INDEX: 26.96 KG/M2 | WEIGHT: 182 LBS | HEIGHT: 69 IN | HEART RATE: 63 BPM | OXYGEN SATURATION: 98 %

## 2019-01-18 DIAGNOSIS — L21.9 DERMATITIS SEBORRHEICA: ICD-10-CM

## 2019-01-18 DIAGNOSIS — I10 BENIGN ESSENTIAL HTN: Primary | ICD-10-CM

## 2019-01-18 DIAGNOSIS — N40.1 BENIGN PROSTATIC HYPERPLASIA WITH LOWER URINARY TRACT SYMPTOMS, SYMPTOM DETAILS UNSPECIFIED: ICD-10-CM

## 2019-01-18 PROCEDURE — 99214 OFFICE O/P EST MOD 30 MIN: CPT | Performed by: FAMILY MEDICINE

## 2019-01-18 RX ORDER — AMLODIPINE BESYLATE AND BENAZEPRIL HYDROCHLORIDE 10; 20 MG/1; MG/1
1 CAPSULE ORAL DAILY
Qty: 90 CAPSULE | Refills: 1 | Status: SHIPPED | OUTPATIENT
Start: 2019-01-18 | End: 2019-01-18 | Stop reason: SDUPTHER

## 2019-01-18 RX ORDER — CHLORTHALIDONE 25 MG/1
25 TABLET ORAL DAILY
Qty: 90 TABLET | Refills: 1 | Status: SHIPPED | OUTPATIENT
Start: 2019-01-18 | End: 2019-01-18 | Stop reason: SDUPTHER

## 2019-01-18 RX ORDER — FINASTERIDE 5 MG/1
5 TABLET, FILM COATED ORAL DAILY
Qty: 90 TABLET | Refills: 1 | Status: SHIPPED | OUTPATIENT
Start: 2019-01-18 | End: 2019-01-18 | Stop reason: SDUPTHER

## 2019-01-18 RX ORDER — MOMETASONE FUROATE 1 MG/G
CREAM TOPICAL DAILY
Qty: 15 G | Refills: 0 | Status: SHIPPED | OUTPATIENT
Start: 2019-01-18 | End: 2019-01-22 | Stop reason: SDUPTHER

## 2019-01-18 RX ORDER — FINASTERIDE 5 MG/1
5 TABLET, FILM COATED ORAL DAILY
Qty: 90 TABLET | Refills: 1 | Status: SHIPPED | OUTPATIENT
Start: 2019-01-18 | End: 2019-07-19 | Stop reason: SDUPTHER

## 2019-01-18 RX ORDER — AMLODIPINE BESYLATE AND BENAZEPRIL HYDROCHLORIDE 10; 20 MG/1; MG/1
1 CAPSULE ORAL DAILY
Qty: 90 CAPSULE | Refills: 1 | Status: SHIPPED | OUTPATIENT
Start: 2019-01-18 | End: 2019-07-19 | Stop reason: SDUPTHER

## 2019-01-18 RX ORDER — CHLORTHALIDONE 25 MG/1
25 TABLET ORAL DAILY
Qty: 90 TABLET | Refills: 1 | Status: SHIPPED | OUTPATIENT
Start: 2019-01-18 | End: 2019-07-19 | Stop reason: SDUPTHER

## 2019-01-18 RX ORDER — MINOCYCLINE HYDROCHLORIDE 50 MG/1
CAPSULE ORAL
COMMUNITY
Start: 2019-01-01 | End: 2020-05-29

## 2019-01-18 RX ORDER — BETAMETHASONE DIPROPIONATE 0.5 MG/G
LOTION TOPICAL 2 TIMES DAILY
Qty: 60 ML | Refills: 2 | Status: SHIPPED | OUTPATIENT
Start: 2019-01-18 | End: 2019-01-22 | Stop reason: SDUPTHER

## 2019-01-18 NOTE — PROGRESS NOTES
Problem List Items Addressed This Visit        Cardiovascular and Mediastinum    Benign essential HTN - Primary    Current Assessment & Plan     Hypertension is worsening though since he's been exercising it is doing better. .  He is going to try taking the finesteride at night and increase exercise and talk to me online about what it is doing  Blood pressure will be reassessed in 2 weeks.         Relevant Medications    amLODIPine-benazepril (LOTREL) 10-20 MG per capsule    chlorthalidone (HYGROTON) 25 MG tablet       Musculoskeletal and Integument    Dermatitis seborrheica       Genitourinary    Benign prostatic hypertrophy    Relevant Medications    finasteride (PROSCAR) 5 MG tablet         Return in about 6 months (around 7/18/2019) for lab with next visit, yearly Medicare Exam.  There are no Patient Instructions on file for this visit.  Imer Jordan is a 78 y.o. male being seen in our office today for Hypertension                 He  reports that he is a non-smoker but has been exposed to tobacco smoke. He has never used smokeless tobacco. He reports that he does not drink alcohol or use drugs.             Has started back on his exercise last week and his BP is going down. BPs are going 160s-140's, especially in the am.       Hypertension   This is a chronic problem. The current episode started more than 1 year ago. The problem has been gradually improving since onset. The problem is resistant. Pertinent negatives include no anxiety, blurred vision, chest pain, headaches, malaise/fatigue, neck pain, orthopnea, palpitations, peripheral edema, PND, shortness of breath or sweats. There are no associated agents to hypertension. There are no known risk factors for coronary artery disease. Current antihypertension treatment includes ACE inhibitors and calcium channel blockers. The current treatment provides moderate improvement. Compliance problems include exercise.  Hypertensive end-organ damage includes kidney  disease (mild).                The following portions of the patient's history were reviewed and updated as appropriate:PMHroutine: Social history , Allergies, Current Medications, Active Problem List and Health Maintenance            Review of Systems   Constitutional: Negative for activity change, appetite change, chills, fatigue, fever, malaise/fatigue and unexpected weight change.   HENT: Negative for congestion, ear pain, hearing loss, mouth sores, nosebleeds, rhinorrhea and sore throat.    Eyes: Negative for blurred vision, pain and visual disturbance.   Respiratory: Negative for cough, shortness of breath and wheezing.    Cardiovascular: Negative for chest pain, palpitations, orthopnea, leg swelling and PND.   Gastrointestinal: Negative for abdominal distention, abdominal pain, blood in stool, constipation, diarrhea, nausea and vomiting.   Endocrine: Negative for cold intolerance and heat intolerance.   Genitourinary: Negative for difficulty urinating, discharge, dysuria, frequency, hematuria and urgency.   Musculoskeletal: Negative for back pain, joint swelling and neck pain.   Skin: Negative for rash and wound.   Neurological: Negative for dizziness, weakness, numbness and headaches.   Hematological: Does not bruise/bleed easily.   Psychiatric/Behavioral: Negative for confusion, dysphoric mood, sleep disturbance and suicidal ideas. The patient is not nervous/anxious.                 BP Readings from Last 1 Encounters:   01/18/19 168/52     Wt Readings from Last 3 Encounters:   01/18/19 82.6 kg (182 lb)   07/16/18 85.7 kg (189 lb)   04/13/18 89.4 kg (197 lb)   Body mass index is 26.88 kg/m².             Physical Exam   Constitutional: He is oriented to person, place, and time. Vital signs are normal. He appears well-developed and well-nourished. No distress.   HENT:   Head: Normocephalic.   Cardiovascular: Normal rate and regular rhythm.   Murmur heard.   Crescendo systolic murmur is present with a grade of  2/6.  Pulmonary/Chest: Effort normal and breath sounds normal.   Neurological: He is alert and oriented to person, place, and time. Gait normal.   Psychiatric: He has a normal mood and affect. His behavior is normal. Judgment and thought content normal.   Vitals reviewed.        No visits with results within 1 Month(s) from this visit.   Latest known visit with results is:   Office Visit on 07/16/2018   Component Date Value Ref Range Status   • Glucose 07/16/2018 97  65 - 99 mg/dL Final   • BUN 07/16/2018 25  8 - 27 mg/dL Final   • Creatinine 07/16/2018 1.12  0.76 - 1.27 mg/dL Final   • eGFR Non  Am 07/16/2018 64  >59 mL/min/1.73 Final   • eGFR African Am 07/16/2018 74  >59 mL/min/1.73 Final   • BUN/Creatinine Ratio 07/16/2018 22  10 - 24 Final   • Sodium 07/16/2018 141  134 - 144 mmol/L Final   • Potassium 07/16/2018 4.4  3.5 - 5.2 mmol/L Final   • Chloride 07/16/2018 105  96 - 106 mmol/L Final   • Total CO2 07/16/2018 26  20 - 29 mmol/L Final   • Calcium 07/16/2018 8.9  8.6 - 10.2 mg/dL Final   • Total Protein 07/16/2018 6.6  6.0 - 8.5 g/dL Final   • Albumin 07/16/2018 4.0  3.5 - 4.8 g/dL Final   • Globulin 07/16/2018 2.6  1.5 - 4.5 g/dL Final   • A/G Ratio 07/16/2018 1.5  1.2 - 2.2 Final   • Total Bilirubin 07/16/2018 0.2  0.0 - 1.2 mg/dL Final   • Alkaline Phosphatase 07/16/2018 80  39 - 117 IU/L Final   • AST (SGOT) 07/16/2018 18  0 - 40 IU/L Final   • ALT (SGPT) 07/16/2018 15  0 - 44 IU/L Final   • Total Cholesterol 07/16/2018 163  100 - 199 mg/dL Final   • Triglycerides 07/16/2018 96  0 - 149 mg/dL Final   • HDL Cholesterol 07/16/2018 30* >39 mg/dL Final   • VLDL Cholesterol 07/16/2018 19  5 - 40 mg/dL Final   • LDL Cholesterol  07/16/2018 114* 0 - 99 mg/dL Final   • LDL/HDL Ratio 07/16/2018 3.8* 0.0 - 3.6 ratio Final    Comment:                                     LDL/HDL Ratio                                              Men  Women                                1/2 Avg.Risk  1.0    1.5                                     Avg.Risk  3.6    3.2                                 2X Avg.Risk  6.2    5.0                                 3X Avg.Risk  8.0    6.1     • WBC 07/16/2018 5.9  3.4 - 10.8 x10E3/uL Final   • RBC 07/16/2018 4.63  4.14 - 5.80 x10E6/uL Final   • Hemoglobin 07/16/2018 13.0  13.0 - 17.7 g/dL Final   • Hematocrit 07/16/2018 39.7  37.5 - 51.0 % Final   • MCV 07/16/2018 86  79 - 97 fL Final   • MCH 07/16/2018 28.1  26.6 - 33.0 pg Final   • MCHC 07/16/2018 32.7  31.5 - 35.7 g/dL Final   • RDW 07/16/2018 14.8  12.3 - 15.4 % Final   • Platelets 07/16/2018 176  150 - 379 x10E3/uL Final   • Neutrophil Rel % 07/16/2018 75  Not Estab. % Final   • Lymphocyte Rel % 07/16/2018 17  Not Estab. % Final   • Monocyte Rel % 07/16/2018 6  Not Estab. % Final   • Eosinophil Rel % 07/16/2018 2  Not Estab. % Final   • Basophil Rel % 07/16/2018 0  Not Estab. % Final   • Neutrophils Absolute 07/16/2018 4.4  1.4 - 7.0 x10E3/uL Final   • Lymphocytes Absolute 07/16/2018 1.0  0.7 - 3.1 x10E3/uL Final   • Monocytes Absolute 07/16/2018 0.4  0.1 - 0.9 x10E3/uL Final   • Eosinophils Absolute 07/16/2018 0.1  0.0 - 0.4 x10E3/uL Final   • Basophils Absolute 07/16/2018 0.0  0.0 - 0.2 x10E3/uL Final   • Immature Granulocyte Rel % 07/16/2018 0  Not Estab. % Final   • Immature Grans Absolute 07/16/2018 0.0  0.0 - 0.1 x10E3/uL Final   • Hemoglobin A1C 07/16/2018 5.6  4.8 - 5.6 % Final    Comment:          Pre-diabetes: 5.7 - 6.4           Diabetes: >6.4           Glycemic control for adults with diabetes: <7.0

## 2019-01-18 NOTE — ASSESSMENT & PLAN NOTE
Hypertension is worsening though since he's been exercising it is doing better. .  He is going to try taking the finesteride at night and increase exercise and talk to me online about what it is doing  Blood pressure will be reassessed in 2 weeks.

## 2019-01-22 DIAGNOSIS — L21.9 DERMATITIS SEBORRHEICA: ICD-10-CM

## 2019-01-22 RX ORDER — BETAMETHASONE DIPROPIONATE 0.5 MG/G
LOTION TOPICAL 2 TIMES DAILY
Qty: 60 ML | Refills: 2 | Status: SHIPPED | OUTPATIENT
Start: 2019-01-22 | End: 2020-06-01 | Stop reason: SDUPTHER

## 2019-01-22 RX ORDER — MOMETASONE FUROATE 1 MG/G
CREAM TOPICAL DAILY
Qty: 15 G | Refills: 0 | Status: SHIPPED | OUTPATIENT
Start: 2019-01-22 | End: 2021-04-29 | Stop reason: SDUPTHER

## 2019-07-19 ENCOUNTER — OFFICE VISIT (OUTPATIENT)
Dept: FAMILY MEDICINE CLINIC | Facility: CLINIC | Age: 79
End: 2019-07-19

## 2019-07-19 VITALS
HEIGHT: 69 IN | BODY MASS INDEX: 29 KG/M2 | SYSTOLIC BLOOD PRESSURE: 142 MMHG | RESPIRATION RATE: 18 BRPM | OXYGEN SATURATION: 98 % | WEIGHT: 195.8 LBS | HEART RATE: 77 BPM | DIASTOLIC BLOOD PRESSURE: 60 MMHG

## 2019-07-19 DIAGNOSIS — I77.810 ASCENDING AORTA DILATION (HCC): ICD-10-CM

## 2019-07-19 DIAGNOSIS — N28.9 MILD RENAL INSUFFICIENCY: ICD-10-CM

## 2019-07-19 DIAGNOSIS — N40.1 BENIGN PROSTATIC HYPERPLASIA WITH LOWER URINARY TRACT SYMPTOMS, SYMPTOM DETAILS UNSPECIFIED: ICD-10-CM

## 2019-07-19 DIAGNOSIS — E78.2 MIXED HYPERLIPIDEMIA: ICD-10-CM

## 2019-07-19 DIAGNOSIS — I10 BENIGN ESSENTIAL HTN: ICD-10-CM

## 2019-07-19 DIAGNOSIS — Z79.899 HIGH RISK MEDICATION USE: ICD-10-CM

## 2019-07-19 DIAGNOSIS — R73.9 HYPERGLYCEMIA: ICD-10-CM

## 2019-07-19 DIAGNOSIS — Z00.00 MEDICARE ANNUAL WELLNESS VISIT, SUBSEQUENT: Primary | ICD-10-CM

## 2019-07-19 PROBLEM — E66.9 OBESITY, CLASS I, BMI 30-34.9: Status: RESOLVED | Noted: 2019-07-16 | Resolved: 2019-07-19

## 2019-07-19 PROBLEM — E66.811 OBESITY, CLASS I, BMI 30-34.9: Status: RESOLVED | Noted: 2019-07-16 | Resolved: 2019-07-19

## 2019-07-19 PROBLEM — E66.811 OBESITY, CLASS I, BMI 30-34.9: Status: ACTIVE | Noted: 2019-07-16

## 2019-07-19 PROBLEM — E66.9 OBESITY, CLASS I, BMI 30-34.9: Status: ACTIVE | Noted: 2019-07-16

## 2019-07-19 LAB
ALBUMIN SERPL-MCNC: 4.1 G/DL (ref 3.5–5.2)
ALBUMIN/GLOB SERPL: 2 G/DL
ALP SERPL-CCNC: 127 U/L (ref 39–117)
ALT SERPL-CCNC: 21 U/L (ref 1–41)
AST SERPL-CCNC: 24 U/L (ref 1–40)
BASOPHILS # BLD AUTO: 0.01 10*3/MM3 (ref 0–0.2)
BASOPHILS NFR BLD AUTO: 0.2 % (ref 0–1.5)
BILIRUB SERPL-MCNC: 0.4 MG/DL (ref 0.2–1.2)
BUN SERPL-MCNC: 26 MG/DL (ref 8–23)
BUN/CREAT SERPL: 20 (ref 7–25)
CALCIUM SERPL-MCNC: 8.8 MG/DL (ref 8.6–10.5)
CHLORIDE SERPL-SCNC: 102 MMOL/L (ref 98–107)
CHOLEST SERPL-MCNC: 168 MG/DL (ref 0–200)
CO2 SERPL-SCNC: 28.4 MMOL/L (ref 22–29)
CREAT SERPL-MCNC: 1.3 MG/DL (ref 0.76–1.27)
EOSINOPHIL # BLD AUTO: 0.12 10*3/MM3 (ref 0–0.4)
EOSINOPHIL NFR BLD AUTO: 2.3 % (ref 0.3–6.2)
ERYTHROCYTE [DISTWIDTH] IN BLOOD BY AUTOMATED COUNT: 13.9 % (ref 12.3–15.4)
GLOBULIN SER CALC-MCNC: 2.1 GM/DL
GLUCOSE SERPL-MCNC: 102 MG/DL (ref 65–99)
HCT VFR BLD AUTO: 44 % (ref 37.5–51)
HDLC SERPL-MCNC: 34 MG/DL (ref 40–60)
HGB BLD-MCNC: 13.9 G/DL (ref 13–17.7)
IMM GRANULOCYTES # BLD AUTO: 0.02 10*3/MM3 (ref 0–0.05)
IMM GRANULOCYTES NFR BLD AUTO: 0.4 % (ref 0–0.5)
LDLC SERPL CALC-MCNC: 111 MG/DL (ref 0–100)
LDLC/HDLC SERPL: 3.27 {RATIO}
LYMPHOCYTES # BLD AUTO: 0.93 10*3/MM3 (ref 0.7–3.1)
LYMPHOCYTES NFR BLD AUTO: 18.1 % (ref 19.6–45.3)
MCH RBC QN AUTO: 28.4 PG (ref 26.6–33)
MCHC RBC AUTO-ENTMCNC: 31.6 G/DL (ref 31.5–35.7)
MCV RBC AUTO: 89.8 FL (ref 79–97)
MONOCYTES # BLD AUTO: 0.47 10*3/MM3 (ref 0.1–0.9)
MONOCYTES NFR BLD AUTO: 9.1 % (ref 5–12)
NEUTROPHILS # BLD AUTO: 3.6 10*3/MM3 (ref 1.7–7)
NEUTROPHILS NFR BLD AUTO: 69.9 % (ref 42.7–76)
NRBC BLD AUTO-RTO: 0 /100 WBC (ref 0–0.2)
PLATELET # BLD AUTO: 173 10*3/MM3 (ref 140–450)
POTASSIUM SERPL-SCNC: 4.6 MMOL/L (ref 3.5–5.2)
PROT SERPL-MCNC: 6.2 G/DL (ref 6–8.5)
RBC # BLD AUTO: 4.9 10*6/MM3 (ref 4.14–5.8)
SODIUM SERPL-SCNC: 142 MMOL/L (ref 136–145)
TRIGL SERPL-MCNC: 114 MG/DL (ref 0–150)
VLDLC SERPL CALC-MCNC: 22.8 MG/DL
WBC # BLD AUTO: 5.15 10*3/MM3 (ref 3.4–10.8)

## 2019-07-19 PROCEDURE — 96160 PT-FOCUSED HLTH RISK ASSMT: CPT | Performed by: FAMILY MEDICINE

## 2019-07-19 PROCEDURE — 99213 OFFICE O/P EST LOW 20 MIN: CPT | Performed by: FAMILY MEDICINE

## 2019-07-19 PROCEDURE — G0439 PPPS, SUBSEQ VISIT: HCPCS | Performed by: FAMILY MEDICINE

## 2019-07-19 PROCEDURE — 99397 PER PM REEVAL EST PAT 65+ YR: CPT | Performed by: FAMILY MEDICINE

## 2019-07-19 RX ORDER — AMLODIPINE BESYLATE AND BENAZEPRIL HYDROCHLORIDE 10; 20 MG/1; MG/1
1 CAPSULE ORAL DAILY
Qty: 90 CAPSULE | Refills: 1 | Status: SHIPPED | OUTPATIENT
Start: 2019-07-19 | End: 2020-01-02

## 2019-07-19 RX ORDER — MELOXICAM 15 MG/1
TABLET ORAL
COMMUNITY
Start: 2019-07-03 | End: 2020-05-29

## 2019-07-19 RX ORDER — OLIVE LEAF EXTRACT 250 MG
1 CAPSULE ORAL DAILY
COMMUNITY

## 2019-07-19 RX ORDER — CHLORTHALIDONE 25 MG/1
25 TABLET ORAL DAILY
Qty: 90 TABLET | Refills: 1 | Status: SHIPPED | OUTPATIENT
Start: 2019-07-19 | End: 2020-01-02

## 2019-07-19 RX ORDER — FINASTERIDE 5 MG/1
5 TABLET, FILM COATED ORAL DAILY
Qty: 90 TABLET | Refills: 1 | Status: SHIPPED | OUTPATIENT
Start: 2019-07-19 | End: 2020-01-02

## 2019-07-19 NOTE — ASSESSMENT & PLAN NOTE
Gerri 7/19/2019  Patient placed on chlorthalidone in am and other medications at night by [facility]. Up here in the office today and on recheck but his BPs at home are pretty good except he has an hypertensive response to exercise.

## 2019-07-19 NOTE — ASSESSMENT & PLAN NOTE
Gerri 7/19/2019 patient's creatinine was more elevated during his recent visit to [facility] for his cardiac issues.  He requests to recheck on that today.

## 2019-07-19 NOTE — PROGRESS NOTES
QUICK REFERENCE INFORMATION:  The ABCs of the Annual Wellness Visit    Subjective   History of Present Illness    Imer Jordan is a 78 y.o. male who presents for a Subsequent Wellness Visit.  BPs at home are good, except he has a hypertensive response to exercise as seen by the numbers following a workout. His BPs otherwise are 108-145, averaging in the low 130s and the bottom number in the 50s-70s.       Hypertension   This is a recurrent problem. The current episode started more than 1 year ago. The problem has been gradually improving since onset. The problem is controlled. Pertinent negatives include no anxiety, blurred vision, chest pain, headaches, malaise/fatigue, neck pain, orthopnea, palpitations, peripheral edema, PND, shortness of breath or sweats. There are no associated agents to hypertension. Risk factors for coronary artery disease include male gender. Current antihypertension treatment includes ACE inhibitors, calcium channel blockers and diuretics. There are no compliance problems.      Reviewed records from [facility]  He will be undergoing cardiac cath of left and right sides in three months. Concern regarding worsening of the aortic valve leakage.   HEALTH RISK ASSESSMENT    1940    Recent Hospitalizations:  No hospitalization(s) within the last year..    Current Medical Providers:  Patient Care Team:  Ann Marie Brizuela MD as PCP - General    Smoking Status:  Social History     Tobacco Use   Smoking Status Passive Smoke Exposure - Never Smoker   Smokeless Tobacco Never Used   Tobacco Comment    occ       Alcohol Consumption:  Social History     Substance and Sexual Activity   Alcohol Use No     Fall Risk Screen:  STEADI Fall Risk Assessment was completed, and patient is at LOW risk for falls.Assessment completed on:7/19/2019  Depression Screen:   PHQ-2/PHQ-9 Depression Screening 1/18/2019   Little interest or pleasure in doing things 0   Feeling down, depressed, or hopeless 0   Total  Score 0       Health Habits and Functional and Cognitive Screening:  Functional & Cognitive Status 7/19/2019   Do you have difficulty preparing food and eating? No   Do you have difficulty bathing yourself, getting dressed or grooming yourself? No   Do you have difficulty using the toilet? No   Do you have difficulty moving around from place to place? No   Do you have trouble with steps or getting out of a bed or a chair? No   Current Diet Well Balanced Diet   Dental Exam Not up to date   Eye Exam Up to date   Exercise (times per week) 7 times per week   Current Exercise Activities Include Walking   Do you need help using the phone?  No   Are you deaf or do you have serious difficulty hearing?  No   Do you need help with transportation? No   Do you need help shopping? No   Do you need help preparing meals?  No   Do you need help with housework?  No   Do you need help with laundry? No   Do you need help taking your medications? No   Do you need help managing money? No   Do you ever drive or ride in a car without wearing a seat belt? No   Have you felt unusual stress, anger or loneliness in the last month? No   Who do you live with? Spouse   If you need help, do you have trouble finding someone available to you? No   Have you been bothered in the last four weeks by sexual problems? No   Do you have difficulty concentrating, remembering or making decisions? No       Health Habits  Current Diet: Well Balanced Diet  Dental Exam: Not up to date  Eye Exam: Up to date  Exercise (times per week): 7 times per week  Current Exercise Activities Include: Walking    Does the patient have evidence of cognitive impairment? No     Aspirin use counseling: Does not need ASA (and currently is not on it)    Recent Lab Results:  CMP:  Lab Results   Component Value Date    GLU 99 06/10/2019    BUN 24 06/10/2019    CREATININE 1.32 (H) 06/10/2019    EGFRIFNONA 64 07/16/2018    EGFRIFAFRI >60 06/10/2019    BCR 22 07/16/2018      06/10/2019    K 3.9 06/10/2019    CO2 26 06/10/2019    CALCIUM 8.7 06/10/2019    PROTENTOTREF 6.6 07/16/2018    ALBUMIN 4.0 07/16/2018    LABGLOBREF 2.6 07/16/2018    LABIL2 1.5 07/16/2018    BILITOT 0.2 07/16/2018    ALKPHOS 80 07/16/2018    AST 18 07/16/2018    ALT 15 07/16/2018     Lipid Panel:  Lab Results   Component Value Date    TRIG 96 07/16/2018    HDL 30 (L) 07/16/2018    VLDL 19 07/16/2018    LDLHDL 3.8 (H) 07/16/2018     HbA1c:  Lab Results   Component Value Date    HGBA1C 5.6 07/16/2018     Age-appropriate Screening Schedule:  Refer to the list below for future screening recommendations based on patient's age, sex and/or medical conditions. Orders for these recommended tests are listed in the plan section. The patient has been provided with a written plan.    Health Maintenance   Topic Date Due   • LIPID PANEL  07/16/2019   • ZOSTER VACCINE (2 of 3) 01/27/2022 (Originally 6/26/2017)   • INFLUENZA VACCINE  08/01/2019   • COLONOSCOPY  01/07/2023   • TDAP/TD VACCINES (2 - Td) 05/01/2027   • PNEUMOCOCCAL VACCINES (65+ LOW/MEDIUM RISK)  Completed          The following portions of the patient's history were reviewed and updated as appropriate: allergies, current medications, past family history, past medical history, past social history, past surgical history and problem list.    Outpatient Medications Prior to Visit   Medication Sig Dispense Refill   • aspirin 81 MG chewable tablet Chew.     • betamethasone dipropionate (DIPROLENE) 0.05 % lotion Apply  topically to the appropriate area as directed 2 (Two) Times a Day. 60 mL 2   • Glucos-MSM-C-Ek-Kibmfk-Jgerii (GLUCOSAMINE MSM COMPLEX) tablet Take by mouth.     • meloxicam (MOBIC) 15 MG tablet      • minocycline (MINOCIN,DYNACIN) 50 MG capsule      • mometasone (ELOCON) 0.1 % cream Apply  topically to the appropriate area as directed Daily. 15 g 0   • Olive Leaf Extract 250 MG capsule Take 1 capsule by mouth Daily.     • Omega-3 Krill Oil 1000 MG capsule  Take by mouth.     • amLODIPine-benazepril (LOTREL) 10-20 MG per capsule Take 1 capsule by mouth Daily. 90 capsule 1   • chlorthalidone (HYGROTON) 25 MG tablet Take 1 tablet by mouth Daily. 90 tablet 1   • finasteride (PROSCAR) 5 MG tablet Take 1 tablet by mouth Daily. 90 tablet 1     No facility-administered medications prior to visit.        Patient Active Problem List   Diagnosis   • Aortic valve insufficiency   • Benign essential HTN   • Benign prostatic hypertrophy   • ED (erectile dysfunction)   • Acid reflux   • HLD (hyperlipidemia)   • Dermatitis seborrheica   • Hyperglycemia   • Left lumbar radiculopathy   • History of AAA (abdominal aortic aneurysm) repair   • Mild renal insufficiency   • Erectile dysfunction   • Ascending aorta dilation (CMS/HCC)   • Nonrheumatic aortic valve regurgitation       Advance Care Planning:  Patient has an advance directive - a copy has not been provided. Have asked the patient to send this to us to add to record    Identification of Risk Factors:  Risk factors include: cardiovascular risk    Review of Systems   Constitutional: Negative for activity change, appetite change, chills, fatigue, fever, malaise/fatigue and unexpected weight change.   HENT: Negative for congestion, ear pain, hearing loss, mouth sores, nosebleeds, rhinorrhea and sore throat.    Eyes: Negative for blurred vision, pain and visual disturbance.   Respiratory: Negative for cough, shortness of breath and wheezing.    Cardiovascular: Negative for chest pain, palpitations, orthopnea, leg swelling and PND.   Gastrointestinal: Negative for abdominal distention, abdominal pain, blood in stool, constipation, diarrhea, nausea and vomiting.   Endocrine: Negative for cold intolerance and heat intolerance.   Genitourinary: Negative for difficulty urinating, discharge, dysuria, frequency, hematuria and urgency.   Musculoskeletal: Negative for back pain, joint swelling and neck pain.   Skin: Negative for rash and wound.  "  Neurological: Negative for dizziness, weakness, numbness and headaches.   Hematological: Does not bruise/bleed easily.   Psychiatric/Behavioral: Negative for confusion, dysphoric mood, sleep disturbance and suicidal ideas. The patient is not nervous/anxious.      Compared to one year ago, the patient feels his physical health is worse due to increase leakage in his valve and his mental health is the same.    Objective     Vitals:    07/19/19 0922   BP: 142/60   Pulse: 77   Resp: 18   SpO2: 98%   Weight: 88.8 kg (195 lb 12.8 oz)   Height: 175.3 cm (69\")   Body mass index is 28.91 kg/m².    Physical Exam   Constitutional: He is oriented to person, place, and time. Vital signs are normal. He appears well-developed and well-nourished. No distress.   HENT:   Head: Normocephalic.   Cardiovascular: Normal rate and regular rhythm.   Murmur (2/6 holosytolic, crescendo. ) heard.  Pulmonary/Chest: Effort normal and breath sounds normal.   Neurological: He is alert and oriented to person, place, and time. Gait normal.   Psychiatric: He has a normal mood and affect. His behavior is normal. Judgment and thought content normal.   Vitals reviewed.      Patient's Body mass index is 28.91 kg/m². BMI is above normal parameters. Recommendations include: exercise counseling and He's been having joint problems, but he has continued to walk and use the elliptical more lately.    Assessment/Plan   Patient Self-Management and Personalized Health Advice  The patient has been provided with information about:   Cardiovascular risk and preventive services including:     · Advance directive, Shingles Vaccine recommended.    Visit Diagnoses:  Problem List Items Addressed This Visit        Cardiovascular and Mediastinum    Ascending aorta dilation (CMS/HCC)    Overview     Patient has close followup at [facility] for this issue         Benign essential HTN    Current Assessment & Plan     Gerri 7/19/2019  Patient placed on " chlorthalidone in am and other medications at night by [facility]. Up here in the office today and on recheck but his BPs at home are pretty good except he has an hypertensive response to exercise.          Relevant Medications    amLODIPine-benazepril (LOTREL) 10-20 MG per capsule    chlorthalidone (HYGROTON) 25 MG tablet    HLD (hyperlipidemia)    Overview     [facility] 7/19/2019  Labs are drawn today.              Genitourinary    Benign prostatic hypertrophy    Relevant Medications    finasteride (PROSCAR) 5 MG tablet    Mild renal insufficiency    Overview     OVERVIEW:  Noted 4/13/2018 at [facility] during AAA surgery         Current Assessment & Plan     [facility] 7/19/2019 patient's creatinine was more elevated during his recent visit to [facility] for his cardiac issues.  He requests to recheck on that today.         Relevant Medications    chlorthalidone (HYGROTON) 25 MG tablet       Other    Hyperglycemia    Overview     [facility] 7/19/2019  Labs are drawn today.             Other Visit Diagnoses     Medicare annual wellness visit, subsequent    -  Primary    Relevant Orders    Lipid Panel With LDL / HDL Ratio    Comprehensive Metabolic Panel    High risk medication use        Relevant Orders    CBC & Differential          Orders Placed This Encounter   Procedures   • Lipid Panel With LDL / HDL Ratio   • Comprehensive Metabolic Panel   • CBC & Differential     Order Specific Question:   Manual Differential     Answer:   No       Outpatient Encounter Medications as of 7/19/2019   Medication Sig Dispense Refill   • amLODIPine-benazepril (LOTREL) 10-20 MG per capsule Take 1 capsule by mouth Daily. 90 capsule 1   • aspirin 81 MG chewable tablet Chew.     • betamethasone dipropionate (DIPROLENE) 0.05 % lotion Apply  topically to the appropriate area as directed 2 (Two) Times a Day. 60 mL 2   • chlorthalidone (HYGROTON) 25 MG tablet Take 1 tablet by mouth Daily. 90 tablet 1   • finasteride  (PROSCAR) 5 MG tablet Take 1 tablet by mouth Daily. 90 tablet 1   • Glucos-MSM-C-Th-Kctdtp-Wtzqkq (GLUCOSAMINE MSM COMPLEX) tablet Take by mouth.     • meloxicam (MOBIC) 15 MG tablet      • minocycline (MINOCIN,DYNACIN) 50 MG capsule      • mometasone (ELOCON) 0.1 % cream Apply  topically to the appropriate area as directed Daily. 15 g 0   • Olive Leaf Extract 250 MG capsule Take 1 capsule by mouth Daily.     • Omega-3 Krill Oil 1000 MG capsule Take by mouth.     • [DISCONTINUED] amLODIPine-benazepril (LOTREL) 10-20 MG per capsule Take 1 capsule by mouth Daily. 90 capsule 1   • [DISCONTINUED] chlorthalidone (HYGROTON) 25 MG tablet Take 1 tablet by mouth Daily. 90 tablet 1   • [DISCONTINUED] finasteride (PROSCAR) 5 MG tablet Take 1 tablet by mouth Daily. 90 tablet 1     No facility-administered encounter medications on file as of 7/19/2019.        Current medication list contains high risk medications. No harmful drug interactions have been identified.  Plan of action he will stop the meloxicam when his ankle is better    Follow Up:  Return in about 6 months (around 1/19/2020).     An After Visit Summary and PPPS with all of these plans were given to the patient.

## 2019-07-22 LAB
HBA1C MFR BLD: 5.6 % (ref 4.8–5.6)
Lab: NORMAL
SPECIMEN STATUS: NORMAL

## 2020-01-02 DIAGNOSIS — N40.1 BENIGN PROSTATIC HYPERPLASIA WITH LOWER URINARY TRACT SYMPTOMS, SYMPTOM DETAILS UNSPECIFIED: ICD-10-CM

## 2020-01-02 DIAGNOSIS — I10 BENIGN ESSENTIAL HTN: ICD-10-CM

## 2020-01-02 RX ORDER — AMLODIPINE BESYLATE AND BENAZEPRIL HYDROCHLORIDE 10; 20 MG/1; MG/1
CAPSULE ORAL
Qty: 90 CAPSULE | Refills: 1 | Status: SHIPPED | OUTPATIENT
Start: 2020-01-02 | End: 2020-05-26 | Stop reason: SDUPTHER

## 2020-01-02 RX ORDER — CHLORTHALIDONE 25 MG/1
TABLET ORAL
Qty: 90 TABLET | Refills: 1 | Status: SHIPPED | OUTPATIENT
Start: 2020-01-02 | End: 2020-05-26 | Stop reason: SDUPTHER

## 2020-01-02 RX ORDER — FINASTERIDE 5 MG/1
TABLET, FILM COATED ORAL
Qty: 90 TABLET | Refills: 1 | Status: SHIPPED | OUTPATIENT
Start: 2020-01-02 | End: 2020-05-26 | Stop reason: SDUPTHER

## 2020-05-26 ENCOUNTER — PATIENT MESSAGE (OUTPATIENT)
Dept: FAMILY MEDICINE CLINIC | Facility: CLINIC | Age: 80
End: 2020-05-26

## 2020-05-26 DIAGNOSIS — I10 BENIGN ESSENTIAL HTN: ICD-10-CM

## 2020-05-26 DIAGNOSIS — N40.1 BENIGN PROSTATIC HYPERPLASIA WITH LOWER URINARY TRACT SYMPTOMS, SYMPTOM DETAILS UNSPECIFIED: ICD-10-CM

## 2020-05-26 RX ORDER — CHLORTHALIDONE 25 MG/1
25 TABLET ORAL DAILY
Qty: 90 TABLET | Refills: 0 | Status: SHIPPED | OUTPATIENT
Start: 2020-05-26 | End: 2020-09-15

## 2020-05-26 RX ORDER — FINASTERIDE 5 MG/1
5 TABLET, FILM COATED ORAL DAILY
Qty: 90 TABLET | Refills: 0 | Status: SHIPPED | OUTPATIENT
Start: 2020-05-26 | End: 2020-09-15

## 2020-05-26 RX ORDER — AMLODIPINE BESYLATE AND BENAZEPRIL HYDROCHLORIDE 10; 20 MG/1; MG/1
1 CAPSULE ORAL DAILY
Qty: 90 CAPSULE | Refills: 0 | Status: SHIPPED | OUTPATIENT
Start: 2020-05-26 | End: 2020-09-15

## 2020-05-27 DIAGNOSIS — I10 BENIGN ESSENTIAL HTN: Primary | ICD-10-CM

## 2020-05-27 DIAGNOSIS — R73.9 HYPERGLYCEMIA: ICD-10-CM

## 2020-05-27 DIAGNOSIS — R53.83 FATIGUE, UNSPECIFIED TYPE: ICD-10-CM

## 2020-05-27 DIAGNOSIS — E78.2 MIXED HYPERLIPIDEMIA: ICD-10-CM

## 2020-05-28 ENCOUNTER — RESULTS ENCOUNTER (OUTPATIENT)
Dept: FAMILY MEDICINE CLINIC | Facility: CLINIC | Age: 80
End: 2020-05-28

## 2020-05-28 DIAGNOSIS — E78.2 MIXED HYPERLIPIDEMIA: ICD-10-CM

## 2020-05-28 DIAGNOSIS — I10 BENIGN ESSENTIAL HTN: ICD-10-CM

## 2020-05-28 DIAGNOSIS — R73.9 HYPERGLYCEMIA: ICD-10-CM

## 2020-05-28 DIAGNOSIS — R53.83 FATIGUE, UNSPECIFIED TYPE: ICD-10-CM

## 2020-05-28 LAB
BASOPHILS # BLD AUTO: 0.04 10*3/MM3 (ref 0–0.2)
BASOPHILS NFR BLD AUTO: 0.7 % (ref 0–1.5)
EOSINOPHIL # BLD AUTO: 0.15 10*3/MM3 (ref 0–0.4)
EOSINOPHIL NFR BLD AUTO: 2.4 % (ref 0.3–6.2)
ERYTHROCYTE [DISTWIDTH] IN BLOOD BY AUTOMATED COUNT: 12.5 % (ref 12.3–15.4)
HCT VFR BLD AUTO: 41.9 % (ref 37.5–51)
HGB BLD-MCNC: 14.3 G/DL (ref 13–17.7)
IMM GRANULOCYTES # BLD AUTO: 0.02 10*3/MM3 (ref 0–0.05)
IMM GRANULOCYTES NFR BLD AUTO: 0.3 % (ref 0–0.5)
LYMPHOCYTES # BLD AUTO: 1.15 10*3/MM3 (ref 0.7–3.1)
LYMPHOCYTES NFR BLD AUTO: 18.7 % (ref 19.6–45.3)
MCH RBC QN AUTO: 29.4 PG (ref 26.6–33)
MCHC RBC AUTO-ENTMCNC: 34.1 G/DL (ref 31.5–35.7)
MCV RBC AUTO: 86.2 FL (ref 79–97)
MONOCYTES # BLD AUTO: 0.53 10*3/MM3 (ref 0.1–0.9)
MONOCYTES NFR BLD AUTO: 8.6 % (ref 5–12)
NEUTROPHILS # BLD AUTO: 4.26 10*3/MM3 (ref 1.7–7)
NEUTROPHILS NFR BLD AUTO: 69.3 % (ref 42.7–76)
NRBC BLD AUTO-RTO: 0 /100 WBC (ref 0–0.2)
PLATELET # BLD AUTO: 186 10*3/MM3 (ref 140–450)
RBC # BLD AUTO: 4.86 10*6/MM3 (ref 4.14–5.8)
WBC # BLD AUTO: 6.15 10*3/MM3 (ref 3.4–10.8)

## 2020-05-29 LAB
ALBUMIN SERPL-MCNC: 4.4 G/DL (ref 3.5–5.2)
ALBUMIN/GLOB SERPL: 2.3 G/DL
ALP SERPL-CCNC: 66 U/L (ref 39–117)
ALT SERPL-CCNC: 20 U/L (ref 1–41)
AST SERPL-CCNC: 21 U/L (ref 1–40)
BILIRUB SERPL-MCNC: 0.4 MG/DL (ref 0.2–1.2)
BUN SERPL-MCNC: 23 MG/DL (ref 8–23)
BUN/CREAT SERPL: 16.4 (ref 7–25)
CALCIUM SERPL-MCNC: 9 MG/DL (ref 8.6–10.5)
CHLORIDE SERPL-SCNC: 101 MMOL/L (ref 98–107)
CHOLEST SERPL-MCNC: 150 MG/DL (ref 0–200)
CHOLEST/HDLC SERPL: 4.69 {RATIO}
CO2 SERPL-SCNC: 30.5 MMOL/L (ref 22–29)
CREAT SERPL-MCNC: 1.4 MG/DL (ref 0.76–1.27)
GLOBULIN SER CALC-MCNC: 1.9 GM/DL
GLUCOSE SERPL-MCNC: 104 MG/DL (ref 65–99)
HBA1C MFR BLD: 5.7 % (ref 4.8–5.6)
HDLC SERPL-MCNC: 32 MG/DL (ref 40–60)
LDLC SERPL CALC-MCNC: 98 MG/DL (ref 0–100)
POTASSIUM SERPL-SCNC: 4.6 MMOL/L (ref 3.5–5.2)
PROT SERPL-MCNC: 6.3 G/DL (ref 6–8.5)
SODIUM SERPL-SCNC: 140 MMOL/L (ref 136–145)
TRIGL SERPL-MCNC: 102 MG/DL (ref 0–150)
VLDLC SERPL CALC-MCNC: 20.4 MG/DL (ref 5–40)

## 2020-06-01 ENCOUNTER — OFFICE VISIT (OUTPATIENT)
Dept: FAMILY MEDICINE CLINIC | Facility: CLINIC | Age: 80
End: 2020-06-01

## 2020-06-01 VITALS — HEIGHT: 69 IN | BODY MASS INDEX: 27.85 KG/M2 | WEIGHT: 188 LBS

## 2020-06-01 DIAGNOSIS — E78.2 MIXED HYPERLIPIDEMIA: ICD-10-CM

## 2020-06-01 DIAGNOSIS — L21.9 DERMATITIS SEBORRHEICA: ICD-10-CM

## 2020-06-01 DIAGNOSIS — R73.9 HYPERGLYCEMIA: ICD-10-CM

## 2020-06-01 DIAGNOSIS — Z00.00 MEDICARE ANNUAL WELLNESS VISIT, SUBSEQUENT: Primary | ICD-10-CM

## 2020-06-01 DIAGNOSIS — I10 BENIGN ESSENTIAL HTN: ICD-10-CM

## 2020-06-01 LAB
HCV AB S/CO SERPL IA: <0.1 S/CO RATIO (ref 0–0.9)
Lab: NORMAL
SPECIMEN STATUS: NORMAL

## 2020-06-01 PROCEDURE — G0439 PPPS, SUBSEQ VISIT: HCPCS | Performed by: FAMILY MEDICINE

## 2020-06-01 PROCEDURE — 96160 PT-FOCUSED HLTH RISK ASSMT: CPT | Performed by: FAMILY MEDICINE

## 2020-06-01 RX ORDER — BETAMETHASONE DIPROPIONATE 0.5 MG/G
LOTION TOPICAL 2 TIMES DAILY
Qty: 60 ML | Refills: 2 | Status: SHIPPED | OUTPATIENT
Start: 2020-06-01 | End: 2021-08-31 | Stop reason: SDUPTHER

## 2020-09-15 DIAGNOSIS — N40.1 BENIGN PROSTATIC HYPERPLASIA WITH LOWER URINARY TRACT SYMPTOMS, SYMPTOM DETAILS UNSPECIFIED: ICD-10-CM

## 2020-09-15 DIAGNOSIS — I10 BENIGN ESSENTIAL HTN: ICD-10-CM

## 2020-09-15 RX ORDER — CHLORTHALIDONE 25 MG/1
25 TABLET ORAL DAILY
Qty: 90 TABLET | Refills: 1 | Status: SHIPPED | OUTPATIENT
Start: 2020-09-15 | End: 2021-03-22

## 2020-09-15 RX ORDER — FINASTERIDE 5 MG/1
5 TABLET, FILM COATED ORAL DAILY
Qty: 90 TABLET | Refills: 1 | Status: SHIPPED | OUTPATIENT
Start: 2020-09-15 | End: 2021-03-22

## 2020-09-15 RX ORDER — AMLODIPINE BESYLATE AND BENAZEPRIL HYDROCHLORIDE 10; 20 MG/1; MG/1
1 CAPSULE ORAL DAILY
Qty: 90 CAPSULE | Refills: 1 | Status: SHIPPED | OUTPATIENT
Start: 2020-09-15 | End: 2020-11-23 | Stop reason: ALTCHOICE

## 2020-11-23 DIAGNOSIS — I10 BENIGN ESSENTIAL HTN: ICD-10-CM

## 2020-11-23 RX ORDER — AMLODIPINE BESYLATE AND BENAZEPRIL HYDROCHLORIDE 5; 20 MG/1; MG/1
1 CAPSULE ORAL DAILY
Qty: 90 CAPSULE | Refills: 0 | Status: SHIPPED | OUTPATIENT
Start: 2020-11-23 | End: 2020-11-30 | Stop reason: SDUPTHER

## 2020-11-30 DIAGNOSIS — I10 BENIGN ESSENTIAL HTN: ICD-10-CM

## 2020-11-30 RX ORDER — AMLODIPINE BESYLATE AND BENAZEPRIL HYDROCHLORIDE 5; 20 MG/1; MG/1
1 CAPSULE ORAL DAILY
Qty: 90 CAPSULE | Refills: 0 | Status: SHIPPED | OUTPATIENT
Start: 2020-11-30 | End: 2021-02-15

## 2021-02-14 DIAGNOSIS — I10 BENIGN ESSENTIAL HTN: ICD-10-CM

## 2021-02-15 RX ORDER — AMLODIPINE BESYLATE AND BENAZEPRIL HYDROCHLORIDE 5; 20 MG/1; MG/1
CAPSULE ORAL
Qty: 30 CAPSULE | Refills: 0 | Status: SHIPPED | OUTPATIENT
Start: 2021-02-15 | End: 2021-03-29 | Stop reason: SDUPTHER

## 2021-03-09 DIAGNOSIS — Z23 IMMUNIZATION DUE: ICD-10-CM

## 2021-03-22 DIAGNOSIS — I10 BENIGN ESSENTIAL HTN: ICD-10-CM

## 2021-03-22 DIAGNOSIS — N40.1 BENIGN PROSTATIC HYPERPLASIA WITH LOWER URINARY TRACT SYMPTOMS, SYMPTOM DETAILS UNSPECIFIED: ICD-10-CM

## 2021-03-22 RX ORDER — CHLORTHALIDONE 25 MG/1
25 TABLET ORAL DAILY
Qty: 30 TABLET | Refills: 0 | Status: SHIPPED | OUTPATIENT
Start: 2021-03-22 | End: 2021-04-29 | Stop reason: SDUPTHER

## 2021-03-22 RX ORDER — FINASTERIDE 5 MG/1
5 TABLET, FILM COATED ORAL DAILY
Qty: 30 TABLET | Refills: 0 | Status: SHIPPED | OUTPATIENT
Start: 2021-03-22 | End: 2021-04-29 | Stop reason: SDUPTHER

## 2021-03-29 DIAGNOSIS — I10 BENIGN ESSENTIAL HTN: ICD-10-CM

## 2021-03-29 RX ORDER — AMLODIPINE BESYLATE AND BENAZEPRIL HYDROCHLORIDE 5; 20 MG/1; MG/1
1 CAPSULE ORAL DAILY
Qty: 15 CAPSULE | Refills: 0 | Status: SHIPPED | OUTPATIENT
Start: 2021-03-29 | End: 2021-04-14 | Stop reason: SDUPTHER

## 2021-04-14 DIAGNOSIS — I10 BENIGN ESSENTIAL HTN: ICD-10-CM

## 2021-04-14 RX ORDER — AMLODIPINE BESYLATE AND BENAZEPRIL HYDROCHLORIDE 5; 20 MG/1; MG/1
1 CAPSULE ORAL DAILY
Qty: 5 CAPSULE | Refills: 0 | Status: SHIPPED | OUTPATIENT
Start: 2021-04-14 | End: 2021-04-29 | Stop reason: SDUPTHER

## 2021-04-29 ENCOUNTER — OFFICE VISIT (OUTPATIENT)
Dept: FAMILY MEDICINE CLINIC | Facility: CLINIC | Age: 81
End: 2021-04-29

## 2021-04-29 VITALS
RESPIRATION RATE: 16 BRPM | BODY MASS INDEX: 27.55 KG/M2 | DIASTOLIC BLOOD PRESSURE: 60 MMHG | HEART RATE: 63 BPM | WEIGHT: 186 LBS | TEMPERATURE: 98.4 F | OXYGEN SATURATION: 98 % | HEIGHT: 69 IN | SYSTOLIC BLOOD PRESSURE: 140 MMHG

## 2021-04-29 DIAGNOSIS — I10 BENIGN ESSENTIAL HTN: ICD-10-CM

## 2021-04-29 DIAGNOSIS — E78.2 MIXED HYPERLIPIDEMIA: ICD-10-CM

## 2021-04-29 DIAGNOSIS — Z00.00 MEDICARE ANNUAL WELLNESS VISIT, SUBSEQUENT: Primary | ICD-10-CM

## 2021-04-29 DIAGNOSIS — I10 BENIGN ESSENTIAL HTN: Primary | ICD-10-CM

## 2021-04-29 DIAGNOSIS — N40.1 BENIGN PROSTATIC HYPERPLASIA WITH LOWER URINARY TRACT SYMPTOMS, SYMPTOM DETAILS UNSPECIFIED: ICD-10-CM

## 2021-04-29 DIAGNOSIS — L21.9 DERMATITIS SEBORRHEICA: ICD-10-CM

## 2021-04-29 DIAGNOSIS — R73.9 HYPERGLYCEMIA: ICD-10-CM

## 2021-04-29 LAB
ALBUMIN SERPL-MCNC: 4.3 G/DL (ref 3.5–5.2)
ALBUMIN/GLOB SERPL: 2 G/DL
ALP SERPL-CCNC: 69 U/L (ref 39–117)
ALT SERPL-CCNC: 14 U/L (ref 1–41)
AST SERPL-CCNC: 18 U/L (ref 1–40)
BASOPHILS # BLD AUTO: 0.04 10*3/MM3 (ref 0–0.2)
BASOPHILS NFR BLD AUTO: 0.6 % (ref 0–1.5)
BILIRUB SERPL-MCNC: 0.4 MG/DL (ref 0–1.2)
BUN SERPL-MCNC: 29 MG/DL (ref 8–23)
BUN/CREAT SERPL: 20.6 (ref 7–25)
CALCIUM SERPL-MCNC: 9.3 MG/DL (ref 8.6–10.5)
CHLORIDE SERPL-SCNC: 108 MMOL/L (ref 98–107)
CHOLEST SERPL-MCNC: 179 MG/DL (ref 0–200)
CHOLEST/HDLC SERPL: 4.59 {RATIO}
CO2 SERPL-SCNC: 28.4 MMOL/L (ref 22–29)
CREAT SERPL-MCNC: 1.41 MG/DL (ref 0.76–1.27)
EOSINOPHIL # BLD AUTO: 0.07 10*3/MM3 (ref 0–0.4)
EOSINOPHIL NFR BLD AUTO: 1.1 % (ref 0.3–6.2)
ERYTHROCYTE [DISTWIDTH] IN BLOOD BY AUTOMATED COUNT: 12.8 % (ref 12.3–15.4)
GLOBULIN SER CALC-MCNC: 2.1 GM/DL
GLUCOSE SERPL-MCNC: 97 MG/DL (ref 65–99)
HCT VFR BLD AUTO: 38.7 % (ref 37.5–51)
HDLC SERPL-MCNC: 39 MG/DL (ref 40–60)
HGB BLD-MCNC: 12.7 G/DL (ref 13–17.7)
IMM GRANULOCYTES # BLD AUTO: 0.02 10*3/MM3 (ref 0–0.05)
IMM GRANULOCYTES NFR BLD AUTO: 0.3 % (ref 0–0.5)
LDLC SERPL CALC-MCNC: 126 MG/DL (ref 0–100)
LYMPHOCYTES # BLD AUTO: 0.94 10*3/MM3 (ref 0.7–3.1)
LYMPHOCYTES NFR BLD AUTO: 15 % (ref 19.6–45.3)
MCH RBC QN AUTO: 28.5 PG (ref 26.6–33)
MCHC RBC AUTO-ENTMCNC: 32.8 G/DL (ref 31.5–35.7)
MCV RBC AUTO: 86.8 FL (ref 79–97)
MONOCYTES # BLD AUTO: 0.57 10*3/MM3 (ref 0.1–0.9)
MONOCYTES NFR BLD AUTO: 9.1 % (ref 5–12)
NEUTROPHILS # BLD AUTO: 4.63 10*3/MM3 (ref 1.7–7)
NEUTROPHILS NFR BLD AUTO: 73.9 % (ref 42.7–76)
NRBC BLD AUTO-RTO: 0 /100 WBC (ref 0–0.2)
PLATELET # BLD AUTO: 203 10*3/MM3 (ref 140–450)
POTASSIUM SERPL-SCNC: 4.8 MMOL/L (ref 3.5–5.2)
PROT SERPL-MCNC: 6.4 G/DL (ref 6–8.5)
RBC # BLD AUTO: 4.46 10*6/MM3 (ref 4.14–5.8)
SODIUM SERPL-SCNC: 144 MMOL/L (ref 136–145)
TRIGL SERPL-MCNC: 73 MG/DL (ref 0–150)
VLDLC SERPL CALC-MCNC: 14 MG/DL (ref 5–40)
WBC # BLD AUTO: 6.27 10*3/MM3 (ref 3.4–10.8)

## 2021-04-29 PROCEDURE — 99214 OFFICE O/P EST MOD 30 MIN: CPT | Performed by: FAMILY MEDICINE

## 2021-04-29 RX ORDER — FINASTERIDE 5 MG/1
5 TABLET, FILM COATED ORAL DAILY
Qty: 90 TABLET | Refills: 1 | Status: SHIPPED | OUTPATIENT
Start: 2021-04-29 | End: 2021-10-25

## 2021-04-29 RX ORDER — CHLORTHALIDONE 25 MG/1
25 TABLET ORAL DAILY
Qty: 90 TABLET | Refills: 1 | Status: SHIPPED | OUTPATIENT
Start: 2021-04-29 | End: 2021-10-25

## 2021-04-29 RX ORDER — MOMETASONE FUROATE 1 MG/G
CREAM TOPICAL DAILY
Qty: 15 G | Refills: 0 | Status: SHIPPED | OUTPATIENT
Start: 2021-04-29 | End: 2022-11-08 | Stop reason: HOSPADM

## 2021-04-29 RX ORDER — AMLODIPINE BESYLATE AND BENAZEPRIL HYDROCHLORIDE 5; 20 MG/1; MG/1
1 CAPSULE ORAL DAILY
Qty: 90 CAPSULE | Refills: 1 | Status: SHIPPED | OUTPATIENT
Start: 2021-04-29 | End: 2021-10-25

## 2021-04-29 NOTE — PROGRESS NOTES
QUICK REFERENCE INFORMATION:  The ABCs of the Annual Wellness Visit     Subjective   History of Present Illness  Imer Jordan is a 80 y.o. male who presents for a Subsequent Wellness Visit.    HEALTH RISK ASSESSMENT    1940  Recent Hospitalizations:  No hospitalization(s) within the last year..  Current Medical Providers:  Patient Care Team:  Ann Marie Brizuela MD as PCP - General  Smoking Status:  Social History     Tobacco Use   Smoking Status Passive Smoke Exposure - Never Smoker   Smokeless Tobacco Never Used   Tobacco Comment    occ     Alcohol Consumption:  Social History     Substance and Sexual Activity   Alcohol Use No     Fall Risk Screen:  STEADI Fall Risk Assessment was completed, and patient is at LOW risk for falls.Assessment completed on:4/29/2021  Depression Screen:   PHQ-2/PHQ-9 Depression Screening 4/29/2021   Little interest or pleasure in doing things 0   Feeling down, depressed, or hopeless 0   Total Score 0     Health Habits and Functional and Cognitive Screening:  Functional & Cognitive Status 4/29/2021   Do you have difficulty preparing food and eating? No   Do you have difficulty bathing yourself, getting dressed or grooming yourself? No   Do you have difficulty using the toilet? No   Do you have difficulty moving around from place to place? No   Do you have trouble with steps or getting out of a bed or a chair? No   Current Diet Well Balanced Diet   Dental Exam Not up to date   Eye Exam Not up to date   Exercise (times per week) 3 times per week   Current Exercise Activities Include Walking   Do you need help using the phone?  No   Are you deaf or do you have serious difficulty hearing?  No   Do you need help with transportation? No   Do you need help shopping? No   Do you need help preparing meals?  No   Do you need help with housework?  No   Do you need help with laundry? No   Do you need help taking your medications? No   Do you need help managing money? No   Do you ever drive or  ride in a car without wearing a seat belt? No   Have you felt unusual stress, anger or loneliness in the last month? No   Who do you live with? Spouse   If you need help, do you have trouble finding someone available to you? No   Have you been bothered in the last four weeks by sexual problems? No   Do you have difficulty concentrating, remembering or making decisions? Yes     Health Habits  Current Diet: Well Balanced Diet  Dental Exam: Not up to date  Eye Exam: Not up to date  Exercise (times per week): 3 times per week  Does the patient have evidence of cognitive impairment? No   Aspirin use counseling: Taking ASA appropriately as indicated  Recent Lab Results:  Will return to office for labs  Age-appropriate Screening Schedule:  Refer to the list below for future screening recommendations based on patient's age, sex and/or medical conditions. Orders for these recommended tests are listed in the plan section. The patient has been provided with a written plan.  Health Maintenance   Topic Date Due   • ZOSTER VACCINE (2 of 3) 01/27/2022 (Originally 6/26/2017)   • LIPID PANEL  05/28/2021   • INFLUENZA VACCINE  08/01/2021   • COLONOSCOPY  01/07/2023   • TDAP/TD VACCINES (3 - Td) 05/01/2027        Outpatient Medications Prior to Visit   Medication Sig Dispense Refill   • aspirin 81 MG chewable tablet Chew.     • betamethasone dipropionate (DIPROLENE) 0.05 % lotion Apply  topically to the appropriate area as directed 2 (Two) Times a Day. 60 mL 2   • Glucos-MSM-C-Ox-Hkzcye-Apbgop (GLUCOSAMINE MSM COMPLEX) tablet Take by mouth.     • Olive Leaf Extract 250 MG capsule Take 1 capsule by mouth Daily.     • Omega-3 Krill Oil 1000 MG capsule Take by mouth.     • amLODIPine-benazepril (LOTREL 5-20) 5-20 MG per capsule Take 1 capsule by mouth Daily. MUST BE SEEN FOR FURTHER REFILLS 5 capsule 0   • chlorthalidone (HYGROTON) 25 MG tablet Take 1 tablet by mouth Daily. MUST BE SEEN FOR FURTHER REFILLS 30 tablet 0   • finasteride  "(PROSCAR) 5 MG tablet Take 1 tablet by mouth Daily. MUST BE SEEN FOR FURTHER REFILLS 30 tablet 0   • mometasone (ELOCON) 0.1 % cream Apply  topically to the appropriate area as directed Daily. 15 g 0   • neomycin-polymyxin-hydrocortisone (CORTISPORIN) 3.5-44525-4 otic solution 3 DROPS RIGHT EAR 4 TIMES DAILY FOR 7 DAYS 10 mL 0     No facility-administered medications prior to visit.     Advanced Care Planning:  ACP discussion was held with the patient during this visit. Patient has an advance directive in EMR which is still valid.   Identification of Risk Factors:  Risk factors include: cardiovascular risk    Compared to one year ago, the patient feels his physical health is the same and his mental health is the same.  Objective     Vitals:    04/29/21 1125   BP: 140/60   Pulse: 63   Resp: 16   Temp: 98.4 °F (36.9 °C)   SpO2: 98%   Weight: 84.4 kg (186 lb)   Height: 175.3 cm (69\")     Physical Exam  Patient's Body mass index is 27.47 kg/m². BMI is above normal parameters. Recommendations include: exercise counseling.    Assessment/Plan   Patient Self-Management and Personalized Health Advice      The patient has been provided with information about:  Advance Directive Discussion  Visit Diagnoses:  Problem List Items Addressed This Visit     Benign essential HTN    Overview     Gerri 6/1/2020  BP is controlled well. He will recheck in six months. He will continue present meds. Lower diastolic. Discussed. Rec d/w [facility] too         Relevant Medications    amLODIPine-benazepril (LOTREL 5-20) 5-20 MG per capsule    chlorthalidone (HYGROTON) 25 MG tablet    Benign prostatic hypertrophy    Relevant Medications    finasteride (PROSCAR) 5 MG tablet    Dermatitis seborrheica    Relevant Medications    mometasone (Elocon) 0.1 % cream      Other Visit Diagnoses     Medicare annual wellness visit, subsequent    -  Primary            Current medication list contains high risk medications. No harmful drug " interactions have been identified.  Plan of action: Continued monitoring  Follow Up:  No follow-ups on file.   An After Visit Summary and PPPS with all of these plans were given to the patient.

## 2021-04-30 LAB — HBA1C MFR BLD: 5.6 % (ref 4.8–5.6)

## 2021-05-05 DIAGNOSIS — E78.2 MIXED HYPERLIPIDEMIA: Primary | ICD-10-CM

## 2021-05-06 RX ORDER — ATORVASTATIN CALCIUM 10 MG/1
10 TABLET, FILM COATED ORAL DAILY
Qty: 90 TABLET | Refills: 0 | Status: SHIPPED | OUTPATIENT
Start: 2021-05-06 | End: 2021-08-05

## 2021-08-05 RX ORDER — ATORVASTATIN CALCIUM 10 MG/1
TABLET, FILM COATED ORAL
Qty: 90 TABLET | Refills: 0 | Status: SHIPPED | OUTPATIENT
Start: 2021-08-05 | End: 2021-10-25

## 2021-08-31 DIAGNOSIS — L21.9 DERMATITIS SEBORRHEICA: ICD-10-CM

## 2021-08-31 RX ORDER — BETAMETHASONE DIPROPIONATE 0.5 MG/G
LOTION TOPICAL 2 TIMES DAILY
Qty: 60 ML | Refills: 2 | Status: SHIPPED | OUTPATIENT
Start: 2021-08-31 | End: 2022-09-06

## 2021-09-01 DIAGNOSIS — N28.9 KIDNEY FUNCTION ABNORMAL: Primary | ICD-10-CM

## 2021-10-25 DIAGNOSIS — I10 BENIGN ESSENTIAL HTN: ICD-10-CM

## 2021-10-25 DIAGNOSIS — N40.1 BENIGN PROSTATIC HYPERPLASIA WITH LOWER URINARY TRACT SYMPTOMS, SYMPTOM DETAILS UNSPECIFIED: ICD-10-CM

## 2021-10-25 RX ORDER — FINASTERIDE 5 MG/1
5 TABLET, FILM COATED ORAL DAILY
Qty: 90 TABLET | Refills: 0 | Status: SHIPPED | OUTPATIENT
Start: 2021-10-25 | End: 2022-01-12

## 2021-10-25 RX ORDER — ATORVASTATIN CALCIUM 10 MG/1
10 TABLET, FILM COATED ORAL DAILY
Qty: 30 TABLET | Refills: 0 | Status: SHIPPED | OUTPATIENT
Start: 2021-10-25 | End: 2021-11-29

## 2021-10-25 RX ORDER — AMLODIPINE BESYLATE AND BENAZEPRIL HYDROCHLORIDE 5; 20 MG/1; MG/1
1 CAPSULE ORAL DAILY
Qty: 30 CAPSULE | Refills: 0 | Status: SHIPPED | OUTPATIENT
Start: 2021-10-25 | End: 2021-11-10 | Stop reason: SDUPTHER

## 2021-10-25 RX ORDER — CHLORTHALIDONE 25 MG/1
25 TABLET ORAL DAILY
Qty: 30 TABLET | Refills: 0 | Status: SHIPPED | OUTPATIENT
Start: 2021-10-25 | End: 2021-11-10 | Stop reason: SDUPTHER

## 2021-11-02 ENCOUNTER — TRANSCRIBE ORDERS (OUTPATIENT)
Dept: ADMINISTRATIVE | Facility: HOSPITAL | Age: 81
End: 2021-11-02

## 2021-11-02 DIAGNOSIS — I10 ESSENTIAL HYPERTENSION, BENIGN: Primary | ICD-10-CM

## 2021-11-10 ENCOUNTER — OFFICE VISIT (OUTPATIENT)
Dept: FAMILY MEDICINE CLINIC | Facility: CLINIC | Age: 81
End: 2021-11-10

## 2021-11-10 VITALS
SYSTOLIC BLOOD PRESSURE: 132 MMHG | DIASTOLIC BLOOD PRESSURE: 60 MMHG | BODY MASS INDEX: 27.7 KG/M2 | WEIGHT: 187 LBS | OXYGEN SATURATION: 98 % | HEART RATE: 63 BPM | HEIGHT: 69 IN | RESPIRATION RATE: 18 BRPM

## 2021-11-10 DIAGNOSIS — N18.31 STAGE 3A CHRONIC KIDNEY DISEASE (HCC): ICD-10-CM

## 2021-11-10 DIAGNOSIS — E78.2 MIXED HYPERLIPIDEMIA: ICD-10-CM

## 2021-11-10 DIAGNOSIS — I10 BENIGN ESSENTIAL HTN: Primary | ICD-10-CM

## 2021-11-10 PROCEDURE — G0008 ADMIN INFLUENZA VIRUS VAC: HCPCS | Performed by: FAMILY MEDICINE

## 2021-11-10 PROCEDURE — 99214 OFFICE O/P EST MOD 30 MIN: CPT | Performed by: FAMILY MEDICINE

## 2021-11-10 PROCEDURE — 90662 IIV NO PRSV INCREASED AG IM: CPT | Performed by: FAMILY MEDICINE

## 2021-11-10 RX ORDER — AMLODIPINE BESYLATE AND BENAZEPRIL HYDROCHLORIDE 5; 20 MG/1; MG/1
1 CAPSULE ORAL DAILY
Qty: 90 CAPSULE | Refills: 1 | Status: SHIPPED | OUTPATIENT
Start: 2021-11-10 | End: 2022-06-01

## 2021-11-10 RX ORDER — CHLORTHALIDONE 25 MG/1
25 TABLET ORAL DAILY
Qty: 90 TABLET | Refills: 1 | Status: SHIPPED | OUTPATIENT
Start: 2021-11-10 | End: 2021-11-10 | Stop reason: SDUPTHER

## 2021-11-10 RX ORDER — AMLODIPINE BESYLATE AND BENAZEPRIL HYDROCHLORIDE 5; 20 MG/1; MG/1
1 CAPSULE ORAL DAILY
Qty: 90 CAPSULE | Refills: 1 | Status: SHIPPED | OUTPATIENT
Start: 2021-11-10 | End: 2021-11-10 | Stop reason: SDUPTHER

## 2021-11-10 RX ORDER — CHLORTHALIDONE 25 MG/1
25 TABLET ORAL DAILY
Qty: 90 TABLET | Refills: 1 | Status: SHIPPED | OUTPATIENT
Start: 2021-11-10 | End: 2022-04-05

## 2021-11-10 NOTE — PROGRESS NOTES
Assessment and Plan     Problem List Items Addressed This Visit        Cardiac and Vasculature    Benign essential HTN - Primary    Overview     [facility] 11/10/2021  BP is controlled well here today. He will recheck in six months. He will continue present medications and per nephrology who has made some med adjustments.          Relevant Medications    amLODIPine-benazepril (LOTREL 5-20) 5-20 MG per capsule    chlorthalidone (HYGROTON) 25 MG tablet    HLD (hyperlipidemia)    Overview     [facility] 11/11/2021  Labs discussed. Needs to lose a little weight though he recently lost about 20 lbs so good for him!      Lab Results   Component Value Date    CHLPL 104 08/09/2021    LDL 56 08/09/2021    HDL 35 (L) 08/09/2021    TRIG 55 08/09/2021                Genitourinary and Reproductive     Stage 3a chronic kidney disease (HCC)    Overview     OVERVIEW:  Noted 4/13/2018 at [facility] during AAA surgery         Current Assessment & Plan     [facility] 11/10/2021    Pt is seeing nephrology. Just saw him in the last month.          Relevant Medications    chlorthalidone (HYGROTON) 25 MG tablet        Return in about 6 months (around 5/10/2022) for yearly Medicare Exam.    Patient was given instructions and counseling regarding his condition or for health maintenance advice. Please see specific information pulled into the AVS if appropriate.        Imer is a 81 y.o. being seen today for  Hypertension   Subjective   History of the Present Illness   No complaints.  Taking meds as ordered.  No headache, chest pain or shortness of breath.   Social History  He  reports that he is a non-smoker but has been exposed to tobacco smoke. He has never used smokeless tobacco. He reports that he does not drink alcohol and does not use drugs.  Objective   Vital Signs  BP Readings from Last 1 Encounters:   11/10/21 132/60     Wt Readings from Last 3 Encounters:   11/10/21 84.8 kg (187 lb)   11/07/21 83.9 kg (185 lb)   04/29/21 84.4 kg  (186 lb)   Body mass index is 27.62 kg/m².     Physical Exam  Vitals reviewed.   Constitutional:       Appearance: Normal appearance. He is well-developed.   Cardiovascular:      Heart sounds: Murmur heard.       Pulmonary:      Breath sounds: Wheezes: 3/6.   Neurological:      Mental Status: He is alert.   Psychiatric:         Behavior: Behavior normal.         Thought Content: Thought content normal.         Judgment: Judgment normal.

## 2021-11-11 PROBLEM — N28.9 MILD RENAL INSUFFICIENCY: Status: RESOLVED | Noted: 2018-04-13 | Resolved: 2021-11-11

## 2021-11-11 PROBLEM — N18.31 STAGE 3A CHRONIC KIDNEY DISEASE (HCC): Status: ACTIVE | Noted: 2021-11-11

## 2021-11-29 RX ORDER — ATORVASTATIN CALCIUM 10 MG/1
10 TABLET, FILM COATED ORAL DAILY
Qty: 90 TABLET | Refills: 1 | Status: SHIPPED | OUTPATIENT
Start: 2021-11-29 | End: 2022-04-05

## 2021-12-14 ENCOUNTER — TRANSCRIBE ORDERS (OUTPATIENT)
Dept: ADMINISTRATIVE | Facility: HOSPITAL | Age: 81
End: 2021-12-14

## 2021-12-14 ENCOUNTER — HOSPITAL ENCOUNTER (OUTPATIENT)
Dept: ULTRASOUND IMAGING | Facility: HOSPITAL | Age: 81
Discharge: HOME | End: 2021-12-14

## 2021-12-14 ENCOUNTER — LAB (OUTPATIENT)
Dept: LAB | Facility: HOSPITAL | Age: 81
End: 2021-12-14

## 2021-12-14 DIAGNOSIS — N18.31 CHRONIC KIDNEY DISEASE (CKD) STAGE G3A/A1, MODERATELY DECREASED GLOMERULAR FILTRATION RATE (GFR) BETWEEN 45-59 ML/MIN/1.73 SQUARE METER AND ALBUMINURIA CREATININE RATIO LESS THAN 30 MG/G (CMS/H* (HCC): ICD-10-CM

## 2021-12-14 DIAGNOSIS — I10 ESSENTIAL HYPERTENSION, MALIGNANT: Primary | ICD-10-CM

## 2021-12-14 DIAGNOSIS — I12.9 HYPERTENSIVE NEPHROPATHY: ICD-10-CM

## 2021-12-14 DIAGNOSIS — I10 ESSENTIAL HYPERTENSION, BENIGN: ICD-10-CM

## 2021-12-14 DIAGNOSIS — I10 ESSENTIAL HYPERTENSION, MALIGNANT: ICD-10-CM

## 2021-12-14 LAB
ALBUMIN SERPL-MCNC: 3.7 G/DL (ref 3.5–5.2)
ANION GAP SERPL CALCULATED.3IONS-SCNC: 6.7 MMOL/L (ref 5–15)
BACTERIA UR QL AUTO: NORMAL /HPF
BILIRUB UR QL STRIP: NEGATIVE
BUN SERPL-MCNC: 20 MG/DL (ref 8–23)
BUN/CREAT SERPL: 17.7 (ref 7–25)
CALCIUM SPEC-SCNC: 8.6 MG/DL (ref 8.6–10.5)
CHLORIDE SERPL-SCNC: 101 MMOL/L (ref 98–107)
CLARITY UR: CLEAR
CO2 SERPL-SCNC: 28.3 MMOL/L (ref 22–29)
COLOR UR: YELLOW
CREAT SERPL-MCNC: 1.13 MG/DL (ref 0.76–1.27)
CREAT UR-MCNC: 43.2 MG/DL
CREAT UR-MCNC: 43.2 MG/DL
EOSINOPHIL SPEC QL MICRO: 0 % EOS/100 CELLS (ref 0–0)
GFR SERPL CREATININE-BSD FRML MDRD: 63 ML/MIN/1.73
GLUCOSE SERPL-MCNC: 95 MG/DL (ref 65–99)
GLUCOSE UR STRIP-MCNC: NEGATIVE MG/DL
HGB UR QL STRIP.AUTO: NEGATIVE
HYALINE CASTS UR QL AUTO: NORMAL /LPF
KETONES UR QL STRIP: NEGATIVE
LEUKOCYTE ESTERASE UR QL STRIP.AUTO: NEGATIVE
NITRITE UR QL STRIP: NEGATIVE
PH UR STRIP.AUTO: 7 [PH] (ref 5–8)
PHOSPHATE SERPL-MCNC: 3 MG/DL (ref 2.5–4.5)
POTASSIUM SERPL-SCNC: 4.2 MMOL/L (ref 3.5–5.2)
PROT ?TM UR-MCNC: 4.7 MG/DL
PROT UR QL STRIP: NEGATIVE
PROT/CREAT UR: 108.8 MG/G CREA (ref 0–200)
RBC # UR STRIP: NORMAL /HPF
REF LAB TEST METHOD: NORMAL
SODIUM SERPL-SCNC: 136 MMOL/L (ref 136–145)
SODIUM UR-SCNC: 66 MMOL/L
SP GR UR STRIP: 1.01 (ref 1–1.03)
SQUAMOUS #/AREA URNS HPF: NORMAL /HPF
URATE SERPL-MCNC: 6.9 MG/DL (ref 3.4–7)
UROBILINOGEN UR QL STRIP: NORMAL
WBC # UR STRIP: NORMAL /HPF

## 2021-12-14 PROCEDURE — 81001 URINALYSIS AUTO W/SCOPE: CPT

## 2021-12-14 PROCEDURE — 80069 RENAL FUNCTION PANEL: CPT

## 2021-12-14 PROCEDURE — 87205 SMEAR GRAM STAIN: CPT

## 2021-12-14 PROCEDURE — 82570 ASSAY OF URINE CREATININE: CPT

## 2021-12-14 PROCEDURE — 84156 ASSAY OF PROTEIN URINE: CPT

## 2021-12-14 PROCEDURE — 36415 COLL VENOUS BLD VENIPUNCTURE: CPT

## 2021-12-14 PROCEDURE — 84300 ASSAY OF URINE SODIUM: CPT

## 2021-12-14 PROCEDURE — 76775 US EXAM ABDO BACK WALL LIM: CPT

## 2021-12-14 PROCEDURE — 84550 ASSAY OF BLOOD/URIC ACID: CPT

## 2021-12-28 ENCOUNTER — APPOINTMENT (OUTPATIENT)
Dept: ULTRASOUND IMAGING | Facility: HOSPITAL | Age: 81
End: 2021-12-28

## 2022-01-12 DIAGNOSIS — N40.1 BENIGN PROSTATIC HYPERPLASIA WITH LOWER URINARY TRACT SYMPTOMS, SYMPTOM DETAILS UNSPECIFIED: ICD-10-CM

## 2022-01-12 RX ORDER — FINASTERIDE 5 MG/1
5 TABLET, FILM COATED ORAL DAILY
Qty: 90 TABLET | Refills: 1 | Status: SHIPPED | OUTPATIENT
Start: 2022-01-12 | End: 2022-08-02

## 2022-03-16 ENCOUNTER — OFFICE VISIT (OUTPATIENT)
Dept: FAMILY MEDICINE CLINIC | Facility: CLINIC | Age: 82
End: 2022-03-16

## 2022-03-16 VITALS
HEART RATE: 64 BPM | DIASTOLIC BLOOD PRESSURE: 82 MMHG | BODY MASS INDEX: 27.62 KG/M2 | OXYGEN SATURATION: 99 % | SYSTOLIC BLOOD PRESSURE: 188 MMHG | HEIGHT: 69 IN | RESPIRATION RATE: 14 BRPM

## 2022-03-16 DIAGNOSIS — M25.551 BILATERAL HIP PAIN: ICD-10-CM

## 2022-03-16 DIAGNOSIS — R42 DIZZINESS: ICD-10-CM

## 2022-03-16 DIAGNOSIS — I10 PRIMARY HYPERTENSION: ICD-10-CM

## 2022-03-16 DIAGNOSIS — M25.552 BILATERAL HIP PAIN: ICD-10-CM

## 2022-03-16 DIAGNOSIS — I35.1 AORTIC VALVE INSUFFICIENCY, ETIOLOGY OF CARDIAC VALVE DISEASE UNSPECIFIED: Primary | ICD-10-CM

## 2022-03-16 PROCEDURE — 99213 OFFICE O/P EST LOW 20 MIN: CPT | Performed by: NURSE PRACTITIONER

## 2022-03-16 NOTE — PROGRESS NOTES
Subjective   Imer Jordan is a 81 y.o. male.   Dizziness, Back Pain, and Hip Pain    History of Present Illness   Vertigo that started over a week ago and it worsened through the week.He then started looking at his BP readings and they were low 113/51.99/48.He stopped taking his amlodipine/benazepril for the last 3 days and his readings and dizziness have improved    He is also having incredible back pain and hip pain.He went to see the chiropractor which helped but he is still in pain.    He had been seeing [facility] for Aortic valve insufficiency but has not been for 3 years.    The following portions of the patient's history were reviewed and updated as appropriate: allergies, current medications, past family history, past medical history, past social history, past surgical history and problem list.    Review of Systems   Constitutional: Positive for activity change. Negative for fatigue and fever.   HENT: Negative for congestion.    Respiratory: Negative for cough and shortness of breath.    Cardiovascular: Negative for chest pain, palpitations and leg swelling.   Musculoskeletal: Positive for back pain and gait problem.   Neurological: Positive for dizziness and light-headedness. Negative for headache.       Objective   Physical Exam  Vitals and nursing note reviewed.   Constitutional:       Appearance: Normal appearance.   HENT:      Head: Normocephalic and atraumatic.      Mouth/Throat:      Mouth: Mucous membranes are moist.   Eyes:      Extraocular Movements: Extraocular movements intact.   Cardiovascular:      Rate and Rhythm: Normal rate and regular rhythm.      Pulses: Normal pulses.      Heart sounds: Murmur heard.   Pulmonary:      Effort: Pulmonary effort is normal.      Breath sounds: Normal breath sounds.   Musculoskeletal:         General: Normal range of motion.      Cervical back: Normal range of motion.      Comments: Pain to bilateral hips and mid thoracic spine and para spinous muscles    Skin:     General: Skin is warm and dry.   Neurological:      Mental Status: He is alert.           Assessment/Plan   Problem List Items Addressed This Visit        Cardiac and Vasculature    Aortic valve insufficiency - Primary    Relevant Orders    Ambulatory Referral to Cardiology      Other Visit Diagnoses     Bilateral hip pain        Relevant Orders    XR Hips Bilateral With or Without Pelvis 2 View    Primary hypertension        Relevant Orders    Basic metabolic panel (Completed)    Dizziness            Consult with Dr Brizuela he is to start his amlodipine/benazepril back up and stop his chlorthalidone.  Referral placed to cardiology.   Hip X-ray ordered, and will call with his results  BMP ordered will call with results    No follow-ups on file.

## 2022-03-17 LAB
BUN SERPL-MCNC: 27 MG/DL (ref 8–27)
BUN/CREAT SERPL: 19 (ref 10–24)
CALCIUM SERPL-MCNC: 8.8 MG/DL (ref 8.6–10.2)
CHLORIDE SERPL-SCNC: 101 MMOL/L (ref 96–106)
CO2 SERPL-SCNC: 23 MMOL/L (ref 20–29)
CREAT SERPL-MCNC: 1.44 MG/DL (ref 0.76–1.27)
EGFRCR SERPLBLD CKD-EPI 2021: 49 ML/MIN/1.73
GLUCOSE SERPL-MCNC: 96 MG/DL (ref 65–99)
POTASSIUM SERPL-SCNC: 4.2 MMOL/L (ref 3.5–5.2)
SODIUM SERPL-SCNC: 139 MMOL/L (ref 134–144)

## 2022-03-23 ENCOUNTER — HOSPITAL ENCOUNTER (OUTPATIENT)
Dept: GENERAL RADIOLOGY | Facility: HOSPITAL | Age: 82
Discharge: HOME | End: 2022-03-23

## 2022-03-23 DIAGNOSIS — M54.50 LUMBAR PAIN: Primary | ICD-10-CM

## 2022-03-23 PROCEDURE — 72100 X-RAY EXAM L-S SPINE 2/3 VWS: CPT

## 2022-03-23 PROCEDURE — 73521 X-RAY EXAM HIPS BI 2 VIEWS: CPT

## 2022-03-24 DIAGNOSIS — D50.0 IRON DEFICIENCY ANEMIA DUE TO CHRONIC BLOOD LOSS: ICD-10-CM

## 2022-03-24 DIAGNOSIS — M25.552 BILATERAL HIP PAIN: ICD-10-CM

## 2022-03-24 DIAGNOSIS — R73.9 HYPERGLYCEMIA: ICD-10-CM

## 2022-03-24 DIAGNOSIS — M54.50 LUMBAR PAIN: Primary | ICD-10-CM

## 2022-03-24 DIAGNOSIS — M25.551 BILATERAL HIP PAIN: ICD-10-CM

## 2022-03-24 DIAGNOSIS — I10 BENIGN ESSENTIAL HTN: Primary | ICD-10-CM

## 2022-03-24 DIAGNOSIS — E78.2 MIXED HYPERLIPIDEMIA: ICD-10-CM

## 2022-04-05 ENCOUNTER — OFFICE VISIT (OUTPATIENT)
Dept: CARDIOLOGY | Facility: CLINIC | Age: 82
End: 2022-04-05

## 2022-04-05 VITALS
BODY MASS INDEX: 28.58 KG/M2 | HEART RATE: 68 BPM | SYSTOLIC BLOOD PRESSURE: 138 MMHG | DIASTOLIC BLOOD PRESSURE: 60 MMHG | HEIGHT: 69 IN | WEIGHT: 193 LBS

## 2022-04-05 DIAGNOSIS — I35.1 NONRHEUMATIC AORTIC VALVE INSUFFICIENCY: Primary | ICD-10-CM

## 2022-04-05 DIAGNOSIS — I73.9 PERIPHERAL ARTERIAL DISEASE: ICD-10-CM

## 2022-04-05 DIAGNOSIS — I71.40 ABDOMINAL AORTIC ANEURYSM, WITHOUT RUPTURE: ICD-10-CM

## 2022-04-05 PROCEDURE — 93000 ELECTROCARDIOGRAM COMPLETE: CPT | Performed by: INTERNAL MEDICINE

## 2022-04-05 PROCEDURE — 99205 OFFICE O/P NEW HI 60 MIN: CPT | Performed by: INTERNAL MEDICINE

## 2022-04-05 RX ORDER — CLOBETASOL PROPIONATE 0.5 MG/G
1 CREAM TOPICAL 2 TIMES DAILY PRN
COMMUNITY
Start: 2022-03-14

## 2022-04-05 NOTE — H&P (VIEW-ONLY)
Subjective:     Encounter Date:04/05/2022      Patient ID: Imer Jordan is a 81 y.o. male.    Chief Complaint: Establish care, AI, PAD, AAA, ascending aneurysm  HPI:   This is a 81-year-old man who presents for evaluation.  He has an extensive cardiovascular history.  He has known severe aortic regurgitation by LIN in 2019.  In 2018 he underwent AAA repair with juxtarenal aneurysm repair with a Hemashield gold graft with end and replacement of both hypogastric arteries after hypogastric artery endarterectomy.  Proximal aortic endarterectomy attaching the external iliac artery end-to-side into each limb of the aortobiiliac graft endarterectomy of the SUSSY with reimplantation of Carrel patch, endarterectomy of the left accessory renal artery.  Left and right cardiac catheterization in 2019 showed no evidence of obstructive coronary disease.  All of his above work-up was performed at the clinic clinic.  When he saw the cardiologist in 2019 they scheduled a follow-up stress echo but also noted the need for aortic valve replacement.  The patient today states he was unaware of that recommendation.  He has not followed up at [facility] since then due to the Covid pandemic.  He has not had any further testing over the last 2 years.  I reviewed all of the documentation from [facility] today.    Today he presents for evaluation.  He has severe hip and back pain which is limiting his mobility.  He is unable to walk more than 200 feet.  Previous to this he could walk several blocks at a time.  He has severe shortness of breath as well.  This is occurred within the last few weeks.  His most recent set of labs show a new anemia with a hemoglobin of 7.5 and iron saturation of 5%.  He has no obvious bright red blood per rectum or melena.  He has yet to see a gastroenterologist.  A few weeks ago he had profound dizziness which was positional.  His Lipitor was stopped which he thinks has improved his symptoms  somewhat.  He remains on amlodipine/benazepril 5/20.  He denies orthopnea or PND.  He has mild stable bilateral lower extremity edema.    The very pleasant man who is .  He has a good understanding of his medical conditions.  He occasionally smokes cigars.  He does not drink alcohol.  He has a history of CKD stage III and sees Jason daily.  He has not seen a vascular surgeon in town.    The following portions of the patient's history were reviewed and updated as appropriate: allergies, current medications, past family history, past medical history, past social history, past surgical history and problem list.     REVIEW OF SYSTEMS:   All systems reviewed.  Pertinent positives identified in HPI.  All other systems are negative.    Past Medical History:   Diagnosis Date   • AAA (abdominal aortic aneurysm) without rupture (HCC)     stated in 11- [facility] notes   • Arthritis    • Ascending aorta dilation (HCC)     stated in 11- [facility] notes   • Diverticulitis, colon 09/2012   • Erectile dysfunction    • Hypertension    • Mild renal insufficiency 04/13/2018    OVERVIEW:  Noted 4/13/2018 at [facility] during AAA surgery   • Nonrheumatic aortic valve regurgitation     stated in 11- [facility] notes   • Thoracic compression fracture (HCC)        Family History   Problem Relation Age of Onset   • Thyroid cancer Mother    • Cancer Father    • Stroke Father    • Arthritis Sister    • Arthritis Sister        Social History     Socioeconomic History   • Marital status:    Tobacco Use   • Smoking status: Passive Smoke Exposure - Never Smoker   • Smokeless tobacco: Never Used   • Tobacco comment: occ   Vaping Use   • Vaping Use: Never used   Substance and Sexual Activity   • Alcohol use: No   • Drug use: No   • Sexual activity: Not Currently     Partners: Female     Birth control/protection: None       Allergies   Allergen Reactions   • Niacin Dizziness   •  Spironolactone Nausea Only     Other reaction(s): Headache       Past Surgical History:   Procedure Laterality Date   • COLONOSCOPY     • RENAL ARTERY ENDARTERECTOMY Bilateral 04/02/2018    Done at [place] along with aortic patch         ECG 12 Lead    Date/Time: 4/5/2022 9:29 AM  Performed by: Magui Vega MD  Authorized by: Magui Vega MD   Comparison: not compared with previous ECG   Previous ECG: no previous ECG available  Rhythm: sinus rhythm  Rate: normal  T inversion: V6 and V5  T flattening: I and aVL  QRS axis: left  Other findings: non-specific ST-T wave changes and left ventricular hypertrophy    Clinical impression: abnormal EKG             Objective:         PHYSICAL EXAM:  GEN: VSS, no distress,   Eyes: normal sclera, normal lids and lashes  HENT: moist mucus membranes,   Respiratory: CTAB, no rales or wheezes  CV: RRR, loud blowing 4 out of 6 aortic insufficiency murmur throughout the precordium radiating into the abdomen and carotids, +2 DP and 2+ carotid pulses b/l  GI: NABS, soft,  Nontender, nondistended  MSK: no edema, no scoliosis or kyphosis  Skin: no rash, warm, dry  Heme/Lymph: no bruising or bleeding  Psych: organized thought, normal behavior and affect  Neuro: Cranial nerves grossly intact, Alert and Oriented x 3.         Assessment:          Diagnosis Plan   1. Nonrheumatic aortic valve insufficiency     2. Peripheral arterial disease (HCC)            Plan:         1.  Severe AI: By LIN performed at [facility] in 2019.  He was asymptomatic at that time, however they did suggest AVR.  He was lost to follow-up due to Covid.  I will repeat his echocardiogram this week.  He has a wide pulse pressure and very loud cardiac murmur.  He is quite short of breath, however it is unclear whether it is related to his anemia and/or valve.   2.  Ascending aortic aneurysm: Last measured 4.5 cm in [facility].  3.  Peripheral arterial disease: Extensive AAA repair with iliac end-to-end  anastomosis.  I will get a chest, abdomen, pelvis runoff CT angiogram to assess his aorta.  He has severe back and hip claudication over the last few months which has limited his walking to about 200 feet.  His DP pulses are strong bilaterally.  He will need to see the vascular surgeon to establish care here.  4.  Iron deficiency anemia: We will be seeing gastroenterology soon.  5.  Dizziness: Multifactorial most likely related to anemia  6.  CKD stage III: Sees Jason Gómez.  I have contacted him to discuss the CT angiogram prehydration    Dr. Brizuela, thank you very much for referring this kind patient to me. Please call me with any questions or concerns. I will see the patient again in the office in 3 months.         Magui Vega MD  04/05/22  [facility]    Outpatient Encounter Medications as of 4/5/2022   Medication Sig Dispense Refill   • amLODIPine-benazepril (LOTREL 5-20) 5-20 MG per capsule Take 1 capsule by mouth Daily. 90 capsule 1   • aspirin 81 MG chewable tablet Chew.     • betamethasone dipropionate (DIPROLENE) 0.05 % lotion Apply  topically to the appropriate area as directed 2 (Two) Times a Day. 60 mL 2   • clobetasol (TEMOVATE) 0.05 % cream      • erythromycin (ROMYCIN) 5 MG/GM ophthalmic ointment Administer  to the right eye Every 4 (Four) Hours While Awake. 3.5 g 0   • finasteride (PROSCAR) 5 MG tablet Take 1 tablet by mouth Daily. 90 tablet 1   • fluocinonide (LIDEX) 0.05 % ointment Apply 1 application topically to the appropriate area as directed 2 (Two) Times a Day.     • Glucos-MSM-C-Op-Tydxtt-Afwcsf (GLUCOSAMINE MSM COMPLEX) tablet Take by mouth.     • mometasone (Elocon) 0.1 % cream Apply  topically to the appropriate area as directed Daily. 15 g 0   • Olive Leaf Extract 250 MG capsule Take 1 capsule by mouth Daily.     • Omega-3 Krill Oil 1000 MG capsule Take by mouth.     • triamcinolone (KENALOG) 0.1 % cream Apply 1 application topically to the appropriate area as  directed 2 (Two) Times a Day.     • [DISCONTINUED] atorvastatin (LIPITOR) 10 MG tablet Take 1 tablet by mouth Daily. 90 tablet 1   • [DISCONTINUED] chlorthalidone (HYGROTON) 25 MG tablet Take 1 tablet by mouth Daily. 90 tablet 1     No facility-administered encounter medications on file as of 4/5/2022.

## 2022-04-05 NOTE — PROGRESS NOTES
Subjective:     Encounter Date:04/05/2022      Patient ID: Imer Jordan is a 81 y.o. male.    Chief Complaint: Establish care, AI, PAD, AAA, ascending aneurysm  HPI:   This is a 81-year-old man who presents for evaluation.  He has an extensive cardiovascular history.  He has known severe aortic regurgitation by LIN in 2019.  In 2018 he underwent AAA repair with juxtarenal aneurysm repair with a Hemashield gold graft with end and replacement of both hypogastric arteries after hypogastric artery endarterectomy.  Proximal aortic endarterectomy attaching the external iliac artery end-to-side into each limb of the aortobiiliac graft endarterectomy of the SUSSY with reimplantation of Carrel patch, endarterectomy of the left accessory renal artery.  Left and right cardiac catheterization in 2019 showed no evidence of obstructive coronary disease.  All of his above work-up was performed at the clinic clinic.  When he saw the cardiologist in 2019 they scheduled a follow-up stress echo but also noted the need for aortic valve replacement.  The patient today states he was unaware of that recommendation.  He has not followed up at [facility] since then due to the Covid pandemic.  He has not had any further testing over the last 2 years.  I reviewed all of the documentation from [facility] today.    Today he presents for evaluation.  He has severe hip and back pain which is limiting his mobility.  He is unable to walk more than 200 feet.  Previous to this he could walk several blocks at a time.  He has severe shortness of breath as well.  This is occurred within the last few weeks.  His most recent set of labs show a new anemia with a hemoglobin of 7.5 and iron saturation of 5%.  He has no obvious bright red blood per rectum or melena.  He has yet to see a gastroenterologist.  A few weeks ago he had profound dizziness which was positional.  His Lipitor was stopped which he thinks has improved his symptoms  somewhat.  He remains on amlodipine/benazepril 5/20.  He denies orthopnea or PND.  He has mild stable bilateral lower extremity edema.    The very pleasant man who is .  He has a good understanding of his medical conditions.  He occasionally smokes cigars.  He does not drink alcohol.  He has a history of CKD stage III and sees Jason daily.  He has not seen a vascular surgeon in town.    The following portions of the patient's history were reviewed and updated as appropriate: allergies, current medications, past family history, past medical history, past social history, past surgical history and problem list.     REVIEW OF SYSTEMS:   All systems reviewed.  Pertinent positives identified in HPI.  All other systems are negative.    Past Medical History:   Diagnosis Date   • AAA (abdominal aortic aneurysm) without rupture (HCC)     stated in 11- [facility] notes   • Arthritis    • Ascending aorta dilation (HCC)     stated in 11- [facility] notes   • Diverticulitis, colon 09/2012   • Erectile dysfunction    • Hypertension    • Mild renal insufficiency 04/13/2018    OVERVIEW:  Noted 4/13/2018 at [facility] during AAA surgery   • Nonrheumatic aortic valve regurgitation     stated in 11- [facility] notes   • Thoracic compression fracture (HCC)        Family History   Problem Relation Age of Onset   • Thyroid cancer Mother    • Cancer Father    • Stroke Father    • Arthritis Sister    • Arthritis Sister        Social History     Socioeconomic History   • Marital status:    Tobacco Use   • Smoking status: Passive Smoke Exposure - Never Smoker   • Smokeless tobacco: Never Used   • Tobacco comment: occ   Vaping Use   • Vaping Use: Never used   Substance and Sexual Activity   • Alcohol use: No   • Drug use: No   • Sexual activity: Not Currently     Partners: Female     Birth control/protection: None       Allergies   Allergen Reactions   • Niacin Dizziness   •  Spironolactone Nausea Only     Other reaction(s): Headache       Past Surgical History:   Procedure Laterality Date   • COLONOSCOPY     • RENAL ARTERY ENDARTERECTOMY Bilateral 04/02/2018    Done at [facility] along with aortic patch         ECG 12 Lead    Date/Time: 4/5/2022 9:29 AM  Performed by: Magui Vega MD  Authorized by: Magui Vega MD   Comparison: not compared with previous ECG   Previous ECG: no previous ECG available  Rhythm: sinus rhythm  Rate: normal  T inversion: V6 and V5  T flattening: I and aVL  QRS axis: left  Other findings: non-specific ST-T wave changes and left ventricular hypertrophy    Clinical impression: abnormal EKG             Objective:         PHYSICAL EXAM:  GEN: VSS, no distress,   Eyes: normal sclera, normal lids and lashes  HENT: moist mucus membranes,   Respiratory: CTAB, no rales or wheezes  CV: RRR, loud blowing 4 out of 6 aortic insufficiency murmur throughout the precordium radiating into the abdomen and carotids, +2 DP and 2+ carotid pulses b/l  GI: NABS, soft,  Nontender, nondistended  MSK: no edema, no scoliosis or kyphosis  Skin: no rash, warm, dry  Heme/Lymph: no bruising or bleeding  Psych: organized thought, normal behavior and affect  Neuro: Cranial nerves grossly intact, Alert and Oriented x 3.         Assessment:          Diagnosis Plan   1. Nonrheumatic aortic valve insufficiency     2. Peripheral arterial disease (HCC)            Plan:         1.  Severe AI: By LIN performed at [facility] in 2019.  He was asymptomatic at that time, however they did suggest AVR.  He was lost to follow-up due to Covid.  I will repeat his echocardiogram this week.  He has a wide pulse pressure and very loud cardiac murmur.  He is quite short of breath, however it is unclear whether it is related to his anemia and/or valve.   2.  Ascending aortic aneurysm: Last measured 4.5 cm in [facility].  3.  Peripheral arterial disease: Extensive AAA repair with iliac end-to-end  anastomosis.  I will get a chest, abdomen, pelvis runoff CT angiogram to assess his aorta.  He has severe back and hip claudication over the last few months which has limited his walking to about 200 feet.  His DP pulses are strong bilaterally.  He will need to see the vascular surgeon to establish care here.  4.  Iron deficiency anemia: We will be seeing gastroenterology soon.  5.  Dizziness: Multifactorial most likely related to anemia  6.  CKD stage III: Sees Jason Gómez.  I have contacted him to discuss the CT angiogram prehydration    Dr. Brizuela, thank you very much for referring this kind patient to me. Please call me with any questions or concerns. I will see the patient again in the office in 3 months.         Magui Vega MD  04/05/22  [facility]    Outpatient Encounter Medications as of 4/5/2022   Medication Sig Dispense Refill   • amLODIPine-benazepril (LOTREL 5-20) 5-20 MG per capsule Take 1 capsule by mouth Daily. 90 capsule 1   • aspirin 81 MG chewable tablet Chew.     • betamethasone dipropionate (DIPROLENE) 0.05 % lotion Apply  topically to the appropriate area as directed 2 (Two) Times a Day. 60 mL 2   • clobetasol (TEMOVATE) 0.05 % cream      • erythromycin (ROMYCIN) 5 MG/GM ophthalmic ointment Administer  to the right eye Every 4 (Four) Hours While Awake. 3.5 g 0   • finasteride (PROSCAR) 5 MG tablet Take 1 tablet by mouth Daily. 90 tablet 1   • fluocinonide (LIDEX) 0.05 % ointment Apply 1 application topically to the appropriate area as directed 2 (Two) Times a Day.     • Glucos-MSM-C-Rv-Kaazou-Drnfab (GLUCOSAMINE MSM COMPLEX) tablet Take by mouth.     • mometasone (Elocon) 0.1 % cream Apply  topically to the appropriate area as directed Daily. 15 g 0   • Olive Leaf Extract 250 MG capsule Take 1 capsule by mouth Daily.     • Omega-3 Krill Oil 1000 MG capsule Take by mouth.     • triamcinolone (KENALOG) 0.1 % cream Apply 1 application topically to the appropriate area as  directed 2 (Two) Times a Day.     • [DISCONTINUED] atorvastatin (LIPITOR) 10 MG tablet Take 1 tablet by mouth Daily. 90 tablet 1   • [DISCONTINUED] chlorthalidone (HYGROTON) 25 MG tablet Take 1 tablet by mouth Daily. 90 tablet 1     No facility-administered encounter medications on file as of 4/5/2022.

## 2022-04-06 ENCOUNTER — HOSPITAL ENCOUNTER (OUTPATIENT)
Dept: CARDIOLOGY | Facility: HOSPITAL | Age: 82
Discharge: HOME | End: 2022-04-06
Admitting: INTERNAL MEDICINE

## 2022-04-06 VITALS
WEIGHT: 193 LBS | HEIGHT: 69 IN | HEART RATE: 70 BPM | DIASTOLIC BLOOD PRESSURE: 68 MMHG | SYSTOLIC BLOOD PRESSURE: 134 MMHG | BODY MASS INDEX: 28.58 KG/M2

## 2022-04-06 DIAGNOSIS — I35.1 NONRHEUMATIC AORTIC VALVE INSUFFICIENCY: Primary | ICD-10-CM

## 2022-04-06 DIAGNOSIS — I35.1 NONRHEUMATIC AORTIC VALVE INSUFFICIENCY: ICD-10-CM

## 2022-04-06 DIAGNOSIS — I73.9 PERIPHERAL ARTERIAL DISEASE: ICD-10-CM

## 2022-04-06 LAB
ASCENDING AORTA: 4.8 CM
BH CV ECHO LEFT VENTRICLE GLOBAL LONGITUDINAL STRAIN: -20.9 %
BH CV ECHO MEAS - ACS: 2.28 CM
BH CV ECHO MEAS - AO MAX PG: 11.8 MMHG
BH CV ECHO MEAS - AO MEAN PG: 6.1 MMHG
BH CV ECHO MEAS - AO ROOT DIAM: 4.1 CM
BH CV ECHO MEAS - AO V2 MAX: 171.6 CM/SEC
BH CV ECHO MEAS - AO V2 VTI: 45 CM
BH CV ECHO MEAS - AVA(I,D): 2.19 CM2
BH CV ECHO MEAS - EDV(CUBED): 267.9 ML
BH CV ECHO MEAS - EDV(MOD-SP2): 122 ML
BH CV ECHO MEAS - EDV(MOD-SP4): 186 ML
BH CV ECHO MEAS - EF(MOD-BP): 61.7 %
BH CV ECHO MEAS - EF(MOD-SP2): 54.9 %
BH CV ECHO MEAS - EF(MOD-SP4): 67.7 %
BH CV ECHO MEAS - ESV(CUBED): 80.9 ML
BH CV ECHO MEAS - ESV(MOD-SP2): 55 ML
BH CV ECHO MEAS - ESV(MOD-SP4): 60 ML
BH CV ECHO MEAS - FS: 32.9 %
BH CV ECHO MEAS - IVS/LVPW: 1 CM
BH CV ECHO MEAS - IVSD: 1 CM
BH CV ECHO MEAS - LAT PEAK E' VEL: 6.7 CM/SEC
BH CV ECHO MEAS - LV DIASTOLIC VOL/BSA (35-75): 91.4 CM2
BH CV ECHO MEAS - LV MASS(C)D: 278.9 GRAMS
BH CV ECHO MEAS - LV MAX PG: 6.8 MMHG
BH CV ECHO MEAS - LV MEAN PG: 3.4 MMHG
BH CV ECHO MEAS - LV SYSTOLIC VOL/BSA (12-30): 29.5 CM2
BH CV ECHO MEAS - LV V1 MAX: 130.4 CM/SEC
BH CV ECHO MEAS - LV V1 VTI: 31.9 CM
BH CV ECHO MEAS - LVIDD: 6.4 CM
BH CV ECHO MEAS - LVIDS: 4.3 CM
BH CV ECHO MEAS - LVOT AREA: 3.1 CM2
BH CV ECHO MEAS - LVOT DIAM: 1.98 CM
BH CV ECHO MEAS - LVPWD: 1 CM
BH CV ECHO MEAS - MED PEAK E' VEL: 5.6 CM/SEC
BH CV ECHO MEAS - MR MAX PG: 122.2 MMHG
BH CV ECHO MEAS - MR MAX VEL: 552.8 CM/SEC
BH CV ECHO MEAS - MV A DUR: 0.15 SEC
BH CV ECHO MEAS - MV A MAX VEL: 99.2 CM/SEC
BH CV ECHO MEAS - MV DEC SLOPE: 516.5 CM/SEC2
BH CV ECHO MEAS - MV DEC TIME: 0.16 MSEC
BH CV ECHO MEAS - MV E MAX VEL: 56.3 CM/SEC
BH CV ECHO MEAS - MV E/A: 0.57
BH CV ECHO MEAS - MV MAX PG: 6.1 MMHG
BH CV ECHO MEAS - MV MEAN PG: 2.22 MMHG
BH CV ECHO MEAS - MV V2 VTI: 31.6 CM
BH CV ECHO MEAS - MVA(VTI): 3.1 CM2
BH CV ECHO MEAS - PA ACC TIME: 0.14 SEC
BH CV ECHO MEAS - PA PR(ACCEL): 14 MMHG
BH CV ECHO MEAS - PA V2 MAX: 118.6 CM/SEC
BH CV ECHO MEAS - PULM A REVS DUR: 0.13 SEC
BH CV ECHO MEAS - PULM A REVS VEL: 50.5 CM/SEC
BH CV ECHO MEAS - PULM DIAS VEL: 75.3 CM/SEC
BH CV ECHO MEAS - PULM SYS VEL: 75.3 CM/SEC
BH CV ECHO MEAS - RAP SYSTOLE: 15 MMHG
BH CV ECHO MEAS - RV MAX PG: 2.38 MMHG
BH CV ECHO MEAS - RV V1 MAX: 77.1 CM/SEC
BH CV ECHO MEAS - RV V1 VTI: 16.4 CM
BH CV ECHO MEAS - RVOT DIAM: 2.32 CM
BH CV ECHO MEAS - RVSP: 53 MMHG
BH CV ECHO MEAS - SI(MOD-SP2): 32.9 ML/M2
BH CV ECHO MEAS - SI(MOD-SP4): 61.9 ML/M2
BH CV ECHO MEAS - SV(LVOT): 98.4 ML
BH CV ECHO MEAS - SV(MOD-SP2): 67 ML
BH CV ECHO MEAS - SV(MOD-SP4): 126 ML
BH CV ECHO MEAS - SV(RVOT): 69.4 ML
BH CV ECHO MEAS - TAPSE (>1.6): 2.7 CM
BH CV ECHO MEAS - TR MAX PG: 38.6 MMHG
BH CV ECHO MEAS - TR MAX VEL: 310.7 CM/SEC
BH CV ECHO MEASUREMENTS AVERAGE E/E' RATIO: 9.15
BH CV XLRA - RV BASE: 3.9 CM
BH CV XLRA - RV LENGTH: 7.8 CM
BH CV XLRA - RV MID: 2.9 CM
BH CV XLRA - TDI S': 11.3 CM/SEC
LEFT ATRIUM VOLUME INDEX: 38.3 ML/M2
MAXIMAL PREDICTED HEART RATE: 141 BPM
SINUS: 4.2 CM
STJ: 4.2 CM
STRESS TARGET HR: 120 BPM

## 2022-04-06 PROCEDURE — 93306 TTE W/DOPPLER COMPLETE: CPT

## 2022-04-06 PROCEDURE — 93306 TTE W/DOPPLER COMPLETE: CPT | Performed by: INTERNAL MEDICINE

## 2022-04-06 PROCEDURE — 93356 MYOCRD STRAIN IMG SPCKL TRCK: CPT

## 2022-04-06 PROCEDURE — 93356 MYOCRD STRAIN IMG SPCKL TRCK: CPT | Performed by: INTERNAL MEDICINE

## 2022-04-06 NOTE — PROGRESS NOTES
I spoke to pt he will come for cardiac cath for surgical planning, will schedule it for 2 weeks from now. He has CT scan scheduled for MAY. Hopefully will get that moved up .

## 2022-04-08 ENCOUNTER — CLINICAL SUPPORT (OUTPATIENT)
Dept: FAMILY MEDICINE CLINIC | Facility: CLINIC | Age: 82
End: 2022-04-08

## 2022-04-08 ENCOUNTER — TELEPHONE (OUTPATIENT)
Dept: FAMILY MEDICINE CLINIC | Facility: CLINIC | Age: 82
End: 2022-04-08

## 2022-04-08 DIAGNOSIS — D64.9 ANEMIA, UNSPECIFIED TYPE: Primary | ICD-10-CM

## 2022-04-08 NOTE — TELEPHONE ENCOUNTER
Per Dr. Brizuela's request, I called Dr. Mccarthy's office to request a sooner appointment than the one scheduled on 5/3/22. I spoke with Dr. Fabio Trinidad's nurse and she is going to see where Imer can be worked in. She will call back and ask for either me or Silvio

## 2022-04-11 DIAGNOSIS — D64.9 ANEMIA, UNSPECIFIED TYPE: Primary | ICD-10-CM

## 2022-04-12 LAB
BASOPHILS # BLD AUTO: 0 X10E3/UL (ref 0–0.2)
BASOPHILS NFR BLD AUTO: 1 %
EOSINOPHIL # BLD AUTO: 0.1 X10E3/UL (ref 0–0.4)
EOSINOPHIL NFR BLD AUTO: 1 %
ERYTHROCYTE [DISTWIDTH] IN BLOOD BY AUTOMATED COUNT: 15.8 % (ref 11.6–15.4)
HCT VFR BLD AUTO: 23.3 % (ref 37.5–51)
HGB BLD-MCNC: 7 G/DL (ref 13–17.7)
IMM GRANULOCYTES # BLD AUTO: 0 X10E3/UL (ref 0–0.1)
IMM GRANULOCYTES NFR BLD AUTO: 0 %
LYMPHOCYTES # BLD AUTO: 1.1 X10E3/UL (ref 0.7–3.1)
LYMPHOCYTES NFR BLD AUTO: 16 %
MCH RBC QN AUTO: 22.6 PG (ref 26.6–33)
MCHC RBC AUTO-ENTMCNC: 30 G/DL (ref 31.5–35.7)
MCV RBC AUTO: 75 FL (ref 79–97)
MONOCYTES # BLD AUTO: 0.6 X10E3/UL (ref 0.1–0.9)
MONOCYTES NFR BLD AUTO: 9 %
NEUTROPHILS # BLD AUTO: 4.9 X10E3/UL (ref 1.4–7)
NEUTROPHILS NFR BLD AUTO: 73 %
PLATELET # BLD AUTO: 223 X10E3/UL (ref 150–450)
RBC # BLD AUTO: 3.1 X10E6/UL (ref 4.14–5.8)
WBC # BLD AUTO: 6.7 X10E3/UL (ref 3.4–10.8)

## 2022-04-14 ENCOUNTER — PATIENT ROUNDING (BHMG ONLY) (OUTPATIENT)
Dept: GASTROENTEROLOGY | Facility: CLINIC | Age: 82
End: 2022-04-14

## 2022-04-14 ENCOUNTER — TRANSCRIBE ORDERS (OUTPATIENT)
Dept: ADMINISTRATIVE | Facility: HOSPITAL | Age: 82
End: 2022-04-14

## 2022-04-14 ENCOUNTER — OFFICE VISIT (OUTPATIENT)
Dept: GASTROENTEROLOGY | Facility: CLINIC | Age: 82
End: 2022-04-14

## 2022-04-14 VITALS
BODY MASS INDEX: 28.85 KG/M2 | HEART RATE: 71 BPM | HEIGHT: 69 IN | SYSTOLIC BLOOD PRESSURE: 179 MMHG | WEIGHT: 194.8 LBS | DIASTOLIC BLOOD PRESSURE: 65 MMHG | TEMPERATURE: 97.4 F

## 2022-04-14 DIAGNOSIS — D64.9 ANEMIA, UNSPECIFIED TYPE: Primary | ICD-10-CM

## 2022-04-14 DIAGNOSIS — D50.9 IRON DEFICIENCY ANEMIA, UNSPECIFIED IRON DEFICIENCY ANEMIA TYPE: Primary | ICD-10-CM

## 2022-04-14 DIAGNOSIS — Z01.818 OTHER SPECIFIED PRE-OPERATIVE EXAMINATION: Primary | ICD-10-CM

## 2022-04-14 LAB — HEMOCCULT STL QL IA: POSITIVE

## 2022-04-14 PROCEDURE — 82274 ASSAY TEST FOR BLOOD FECAL: CPT | Performed by: FAMILY MEDICINE

## 2022-04-14 PROCEDURE — 99204 OFFICE O/P NEW MOD 45 MIN: CPT | Performed by: INTERNAL MEDICINE

## 2022-04-14 RX ORDER — PANTOPRAZOLE SODIUM 40 MG/1
40 TABLET, DELAYED RELEASE ORAL DAILY
Qty: 90 TABLET | Refills: 3 | Status: SHIPPED | OUTPATIENT
Start: 2022-04-14 | End: 2023-04-06

## 2022-04-14 NOTE — PROGRESS NOTES
Chief Complaint   Patient presents with   • Anemia     Imer Jordan is a 81 y.o. male who presents with a new onset anemia  HPI     Patient 81-year-old male with history of abdominal aortic aneurysm, ascending aortic aneurysm, aortic insufficiency found with severe anemia.  Patient experiencing shortness of breath on exertion as well as episode of hypotension with dizziness on standing.  Patient hemoglobin noted 12-1/2, 11 months ago dropped to 7.5 and 7.0.  Patient denies chest pain no palpitations no nausea vomiting no melena or bright red blood per rectum.  Patient does report she tested positive in the stool though this is not been completed in the chart.    Past Medical History:   Diagnosis Date   • AAA (abdominal aortic aneurysm) without rupture (HCC)     stated in 11- [facility] notes   • Arthritis    • Ascending aorta dilation (HCC)     stated in 11- [facility] notes   • Diverticulitis, colon 09/2012   • Erectile dysfunction    • Hypertension    • Mild renal insufficiency 04/13/2018    OVERVIEW:  Noted 4/13/2018 at [facility] during AAA surgery   • Nonrheumatic aortic valve regurgitation     stated in 11- [facility] notes   • Thoracic compression fracture (HCC)        Current Outpatient Medications:   •  amLODIPine-benazepril (LOTREL 5-20) 5-20 MG per capsule, Take 1 capsule by mouth Daily., Disp: 90 capsule, Rfl: 1  •  aspirin 81 MG chewable tablet, Chew., Disp: , Rfl:   •  betamethasone dipropionate (DIPROLENE) 0.05 % lotion, Apply  topically to the appropriate area as directed 2 (Two) Times a Day., Disp: 60 mL, Rfl: 2  •  clobetasol (TEMOVATE) 0.05 % cream, , Disp: , Rfl:   •  finasteride (PROSCAR) 5 MG tablet, Take 1 tablet by mouth Daily., Disp: 90 tablet, Rfl: 1  •  fluocinonide (LIDEX) 0.05 % ointment, Apply 1 application topically to the appropriate area as directed 2 (Two) Times a Day., Disp: , Rfl:   •  Glucos-MSM-C-Ww-Mwdlrg-Kpleuo (GLUCOSAMINE MSM  COMPLEX) tablet, Take by mouth., Disp: , Rfl:   •  mometasone (Elocon) 0.1 % cream, Apply  topically to the appropriate area as directed Daily., Disp: 15 g, Rfl: 0  •  Olive Leaf Extract 250 MG capsule, Take 1 capsule by mouth Daily., Disp: , Rfl:   •  Omega-3 Krill Oil 1000 MG capsule, Take by mouth., Disp: , Rfl:   •  triamcinolone (KENALOG) 0.1 % cream, Apply 1 application topically to the appropriate area as directed 2 (Two) Times a Day., Disp: , Rfl:   •  erythromycin (ROMYCIN) 5 MG/GM ophthalmic ointment, Administer  to the right eye Every 4 (Four) Hours While Awake., Disp: 3.5 g, Rfl: 0  •  pantoprazole (PROTONIX) 40 MG EC tablet, Take 1 tablet by mouth Daily., Disp: 90 tablet, Rfl: 3  Allergies   Allergen Reactions   • Niacin Dizziness   • Spironolactone Nausea Only     Other reaction(s): Headache     Social History     Socioeconomic History   • Marital status:    Tobacco Use   • Smoking status: Passive Smoke Exposure - Never Smoker   • Smokeless tobacco: Never Used   • Tobacco comment: occ   Vaping Use   • Vaping Use: Never used   Substance and Sexual Activity   • Alcohol use: No   • Drug use: No   • Sexual activity: Not Currently     Partners: Female     Birth control/protection: None     Family History   Problem Relation Age of Onset   • Thyroid cancer Mother    • Cancer Father    • Stroke Father    • Arthritis Sister    • Arthritis Sister      Review of Systems   Constitutional: Negative.    HENT: Positive for postnasal drip and trouble swallowing. Negative for congestion, dental problem, drooling, rhinorrhea, sore throat, tinnitus and voice change.    Eyes: Negative.    Respiratory: Negative.    Cardiovascular: Negative.    Gastrointestinal: Positive for constipation. Negative for abdominal distention, abdominal pain, anal bleeding, blood in stool, diarrhea, nausea, rectal pain and vomiting.   Endocrine: Negative.    Musculoskeletal: Positive for back pain. Negative for arthralgias, gait problem  and joint swelling.   Skin: Negative.    Allergic/Immunologic: Negative.    Hematological: Negative.      Vitals:    04/14/22 0809   BP: 179/65   Pulse: 71   Temp: 97.4 °F (36.3 °C)     Physical Exam  Vitals and nursing note reviewed.   Constitutional:       Appearance: Normal appearance. He is well-developed and normal weight.   HENT:      Head: Normocephalic and atraumatic.   Eyes:      General: No scleral icterus.     Conjunctiva/sclera: Conjunctivae normal.   Neck:      Trachea: No tracheal deviation.   Cardiovascular:      Rate and Rhythm: Normal rate and regular rhythm.      Heart sounds: Murmur heard.     No friction rub. No gallop.   Pulmonary:      Effort: Pulmonary effort is normal. No respiratory distress.      Breath sounds: Normal breath sounds. No wheezing or rales.   Abdominal:      General: Bowel sounds are normal. There is no distension.      Palpations: Abdomen is soft. There is no mass.      Tenderness: There is no abdominal tenderness. There is no guarding or rebound.   Musculoskeletal:         General: No swelling or tenderness. Normal range of motion.      Cervical back: Normal range of motion.   Skin:     General: Skin is warm and dry.      Coloration: Skin is not jaundiced.      Findings: No rash.   Neurological:      General: No focal deficit present.      Mental Status: He is alert and oriented to person, place, and time.      Cranial Nerves: No cranial nerve deficit.   Psychiatric:         Behavior: Behavior normal.         Thought Content: Thought content normal.         Judgment: Judgment normal.       Diagnoses and all orders for this visit:    Iron deficiency anemia, unspecified iron deficiency anemia type  -     Case Request; Standing  -     Case Request  -     CBC (No Diff)    Other orders  -     pantoprazole (PROTONIX) 40 MG EC tablet; Take 1 tablet by mouth Daily.    Patient 81-year-old male with history of hypertension, aortic valve regurgitation, renal insufficiency and  peripheral vascular disease status post aortobiiliac arterial grafting.  Patient presents with acute drop in hemoglobin.  Review of labs show hemoglobin 12.5, 11 months ago now 7.5 down to 7.0.  Patient denies any [place] bright red blood per rectum or melena but does report some intermittent solid dysphagia.  Patient denies hematemesis no nausea vomiting but takes no medication for reflux or heartburn.  Patient denies any weight loss no change in diet or appetite but has noted some longstanding constipation.  Patient denies any fever chills currently undergoing cardiac work-up.  Patient with orthostatic dizziness and shortness of breath on exertion.  Patient reports though not in the chart that he was tested positive for blood in the stool though none seen.  Patient will need EGD colonoscopy and possible small bowel evaluation if no findings on endoscopy.  We will recheck hemoglobin today and will request cardiology performed clearance prior to sedation for endoscopy.  Will need to know if patient will need to be transfused before further procedures performed.  We will follow-up clinically based on findings and reports.

## 2022-04-14 NOTE — PROGRESS NOTES
April 14, 2022    Hello, may I speak with Imer Jordan?    My name is Carmen      I am  with [facility]  [address] [place] [id number] [phone].    Before we get started may I verify your date of birth? 1940    I am calling to officially welcome you to our practice and ask about your recent visit. Is this a good time to talk? Yes     Tell me about your visit with us. What things went well?  everything went great.         We're always looking for ways to make our patients' experiences even better. Do you have recommendations on ways we may improve?  No    Overall were you satisfied with your first visit to our practice? Yes        I appreciate you taking the time to speak with me today. Is there anything else I can do for you? No       Thank you, and have a great day.

## 2022-04-15 ENCOUNTER — TELEPHONE (OUTPATIENT)
Dept: GASTROENTEROLOGY | Facility: CLINIC | Age: 82
End: 2022-04-15

## 2022-04-15 LAB
ERYTHROCYTE [DISTWIDTH] IN BLOOD BY AUTOMATED COUNT: 15.8 % (ref 11.6–15.4)
HCT VFR BLD AUTO: 23.7 % (ref 37.5–51)
HGB BLD-MCNC: 7.2 G/DL (ref 13–17.7)
MCH RBC QN AUTO: 23 PG (ref 26.6–33)
MCHC RBC AUTO-ENTMCNC: 30.4 G/DL (ref 31.5–35.7)
MCV RBC AUTO: 76 FL (ref 79–97)
PLATELET # BLD AUTO: 213 X10E3/UL (ref 150–450)
RBC # BLD AUTO: 3.13 X10E6/UL (ref 4.14–5.8)
WBC # BLD AUTO: 5.9 X10E3/UL (ref 3.4–10.8)

## 2022-04-19 ENCOUNTER — LAB (OUTPATIENT)
Dept: LAB | Facility: HOSPITAL | Age: 82
End: 2022-04-19

## 2022-04-19 DIAGNOSIS — Z01.818 OTHER SPECIFIED PRE-OPERATIVE EXAMINATION: ICD-10-CM

## 2022-04-19 LAB — SARS-COV-2 ORF1AB RESP QL NAA+PROBE: NOT DETECTED

## 2022-04-19 PROCEDURE — U0004 COV-19 TEST NON-CDC HGH THRU: HCPCS

## 2022-04-19 PROCEDURE — C9803 HOPD COVID-19 SPEC COLLECT: HCPCS

## 2022-04-20 ENCOUNTER — TELEPHONE (OUTPATIENT)
Dept: GASTROENTEROLOGY | Facility: CLINIC | Age: 82
End: 2022-04-20

## 2022-04-20 PROBLEM — D50.9 IRON DEFICIENCY ANEMIA: Status: ACTIVE | Noted: 2022-04-20

## 2022-04-20 NOTE — TELEPHONE ENCOUNTER
luis f Bar for 07/22 arrive at 930/cs- mailing out prep inst on 04/21, advised pt time is subject to change [facility] will call.

## 2022-04-21 ENCOUNTER — HOSPITAL ENCOUNTER (OUTPATIENT)
Facility: HOSPITAL | Age: 82
Setting detail: HOSPITAL OUTPATIENT SURGERY
Discharge: HOME | End: 2022-04-21
Attending: INTERNAL MEDICINE | Admitting: INTERNAL MEDICINE

## 2022-04-21 ENCOUNTER — LAB (OUTPATIENT)
Dept: LAB | Facility: HOSPITAL | Age: 82
End: 2022-04-21

## 2022-04-21 VITALS
HEART RATE: 64 BPM | HEIGHT: 69 IN | BODY MASS INDEX: 28.14 KG/M2 | TEMPERATURE: 97.8 F | SYSTOLIC BLOOD PRESSURE: 149 MMHG | DIASTOLIC BLOOD PRESSURE: 57 MMHG | OXYGEN SATURATION: 98 % | WEIGHT: 190 LBS | RESPIRATION RATE: 18 BRPM

## 2022-04-21 DIAGNOSIS — I35.1 NONRHEUMATIC AORTIC VALVE INSUFFICIENCY: ICD-10-CM

## 2022-04-21 LAB
ANION GAP SERPL CALCULATED.3IONS-SCNC: 8.4 MMOL/L (ref 5–15)
BASOPHILS # BLD AUTO: 0.04 10*3/MM3 (ref 0–0.2)
BASOPHILS NFR BLD AUTO: 0.7 % (ref 0–1.5)
BUN SERPL-MCNC: 20 MG/DL (ref 8–23)
BUN/CREAT SERPL: 14.8 (ref 7–25)
CALCIUM SPEC-SCNC: 8.3 MG/DL (ref 8.6–10.5)
CHLORIDE SERPL-SCNC: 106 MMOL/L (ref 98–107)
CO2 SERPL-SCNC: 25.6 MMOL/L (ref 22–29)
CREAT SERPL-MCNC: 1.35 MG/DL (ref 0.76–1.27)
DEPRECATED RDW RBC AUTO: 44 FL (ref 37–54)
EGFRCR SERPLBLD CKD-EPI 2021: 53.4 ML/MIN/1.73
EOSINOPHIL # BLD AUTO: 0.1 10*3/MM3 (ref 0–0.4)
EOSINOPHIL NFR BLD AUTO: 1.7 % (ref 0.3–6.2)
ERYTHROCYTE [DISTWIDTH] IN BLOOD BY AUTOMATED COUNT: 16.3 % (ref 12.3–15.4)
GLUCOSE SERPL-MCNC: 103 MG/DL (ref 65–99)
HCT VFR BLD AUTO: 24.1 % (ref 37.5–51)
HGB BLD-MCNC: 7 G/DL (ref 13–17.7)
LYMPHOCYTES # BLD AUTO: 0.93 10*3/MM3 (ref 0.7–3.1)
LYMPHOCYTES NFR BLD AUTO: 16.2 % (ref 19.6–45.3)
MCH RBC QN AUTO: 21.6 PG (ref 26.6–33)
MCHC RBC AUTO-ENTMCNC: 29 G/DL (ref 31.5–35.7)
MCV RBC AUTO: 74.4 FL (ref 79–97)
MONOCYTES # BLD AUTO: 0.6 10*3/MM3 (ref 0.1–0.9)
MONOCYTES NFR BLD AUTO: 10.5 % (ref 5–12)
NEUTROPHILS NFR BLD AUTO: 4.04 10*3/MM3 (ref 1.7–7)
NEUTROPHILS NFR BLD AUTO: 70.6 % (ref 42.7–76)
PLATELET # BLD AUTO: 196 10*3/MM3 (ref 140–450)
PMV BLD AUTO: 9.3 FL (ref 6–12)
POTASSIUM SERPL-SCNC: 4.6 MMOL/L (ref 3.5–5.2)
RBC # BLD AUTO: 3.24 10*6/MM3 (ref 4.14–5.8)
SODIUM SERPL-SCNC: 140 MMOL/L (ref 136–145)
WBC NRBC COR # BLD: 5.73 10*3/MM3 (ref 3.4–10.8)

## 2022-04-21 PROCEDURE — C1894 INTRO/SHEATH, NON-LASER: HCPCS | Performed by: INTERNAL MEDICINE

## 2022-04-21 PROCEDURE — 99153 MOD SED SAME PHYS/QHP EA: CPT | Performed by: INTERNAL MEDICINE

## 2022-04-21 PROCEDURE — C1769 GUIDE WIRE: HCPCS | Performed by: INTERNAL MEDICINE

## 2022-04-21 PROCEDURE — 80048 BASIC METABOLIC PNL TOTAL CA: CPT | Performed by: INTERNAL MEDICINE

## 2022-04-21 PROCEDURE — 25010000002 FENTANYL CITRATE (PF) 50 MCG/ML SOLUTION: Performed by: INTERNAL MEDICINE

## 2022-04-21 PROCEDURE — 99152 MOD SED SAME PHYS/QHP 5/>YRS: CPT | Performed by: INTERNAL MEDICINE

## 2022-04-21 PROCEDURE — 93458 L HRT ARTERY/VENTRICLE ANGIO: CPT | Performed by: INTERNAL MEDICINE

## 2022-04-21 PROCEDURE — 85025 COMPLETE CBC W/AUTO DIFF WBC: CPT | Performed by: INTERNAL MEDICINE

## 2022-04-21 PROCEDURE — 0 IOPAMIDOL PER 1 ML: Performed by: INTERNAL MEDICINE

## 2022-04-21 PROCEDURE — 25010000002 HYDRALAZINE PER 20 MG: Performed by: INTERNAL MEDICINE

## 2022-04-21 PROCEDURE — 25010000002 MIDAZOLAM PER 1 MG: Performed by: INTERNAL MEDICINE

## 2022-04-21 PROCEDURE — 25010000002 HEPARIN (PORCINE) PER 1000 UNITS: Performed by: INTERNAL MEDICINE

## 2022-04-21 RX ORDER — LIDOCAINE HYDROCHLORIDE 10 MG/ML
0.1 INJECTION, SOLUTION EPIDURAL; INFILTRATION; INTRACAUDAL; PERINEURAL ONCE AS NEEDED
Status: DISCONTINUED | OUTPATIENT
Start: 2022-04-21 | End: 2022-04-21 | Stop reason: HOSPADM

## 2022-04-21 RX ORDER — SODIUM CHLORIDE 0.9 % (FLUSH) 0.9 %
10 SYRINGE (ML) INJECTION AS NEEDED
Status: DISCONTINUED | OUTPATIENT
Start: 2022-04-21 | End: 2022-04-21 | Stop reason: HOSPADM

## 2022-04-21 RX ORDER — HYDRALAZINE HYDROCHLORIDE 20 MG/ML
INJECTION INTRAMUSCULAR; INTRAVENOUS AS NEEDED
Status: DISCONTINUED | OUTPATIENT
Start: 2022-04-21 | End: 2022-04-21 | Stop reason: HOSPADM

## 2022-04-21 RX ORDER — LIDOCAINE HYDROCHLORIDE 20 MG/ML
INJECTION, SOLUTION INFILTRATION; PERINEURAL AS NEEDED
Status: DISCONTINUED | OUTPATIENT
Start: 2022-04-21 | End: 2022-04-21 | Stop reason: HOSPADM

## 2022-04-21 RX ORDER — SODIUM CHLORIDE 9 MG/ML
75 INJECTION, SOLUTION INTRAVENOUS CONTINUOUS
Status: DISCONTINUED | OUTPATIENT
Start: 2022-04-21 | End: 2022-04-21 | Stop reason: HOSPADM

## 2022-04-21 RX ORDER — SODIUM CHLORIDE 9 MG/ML
250 INJECTION, SOLUTION INTRAVENOUS ONCE AS NEEDED
Status: DISCONTINUED | OUTPATIENT
Start: 2022-04-21 | End: 2022-04-21 | Stop reason: HOSPADM

## 2022-04-21 RX ORDER — MIDAZOLAM HYDROCHLORIDE 1 MG/ML
INJECTION INTRAMUSCULAR; INTRAVENOUS AS NEEDED
Status: DISCONTINUED | OUTPATIENT
Start: 2022-04-21 | End: 2022-04-21 | Stop reason: HOSPADM

## 2022-04-21 RX ORDER — ACETAMINOPHEN 325 MG/1
650 TABLET ORAL EVERY 4 HOURS PRN
Status: DISCONTINUED | OUTPATIENT
Start: 2022-04-21 | End: 2022-04-21 | Stop reason: HOSPADM

## 2022-04-21 RX ORDER — SODIUM CHLORIDE 0.9 % (FLUSH) 0.9 %
10 SYRINGE (ML) INJECTION EVERY 12 HOURS SCHEDULED
Status: DISCONTINUED | OUTPATIENT
Start: 2022-04-21 | End: 2022-04-21 | Stop reason: HOSPADM

## 2022-04-21 RX ORDER — FENTANYL CITRATE 50 UG/ML
INJECTION, SOLUTION INTRAMUSCULAR; INTRAVENOUS AS NEEDED
Status: DISCONTINUED | OUTPATIENT
Start: 2022-04-21 | End: 2022-04-21 | Stop reason: HOSPADM

## 2022-04-21 RX ADMIN — SODIUM CHLORIDE 75 ML/HR: 9 INJECTION, SOLUTION INTRAVENOUS at 07:38

## 2022-04-21 NOTE — DISCHARGE INSTRUCTIONS
[facility]  [address] [place] [address]    Coronary Angiogram (Radial/Ulnar Approach) After Care    Refer to this sheet in the next few weeks. These instructions provide you with information on caring for yourself after your procedure. Your caregiver may also give you more specific instructions. Your treatment has been planned according to current medical practices, but problems sometimes occur. Call your caregiver if you have any problems or questions after your procedure.    Home Care Instructions:  You may shower the day after the procedure. Remove the bandage (dressing) and gently wash the site with plain soap and water. Gently pat the site dry. You may apply a band aid daily for 2 days if desired.    Do not apply powder or lotion to the site.  Do not submerge the affected site in water for 3 to 5 days or until the site is completely healed.   Do not lift, push or pull anything over 5 pounds for 5 days after your procedure or as directed by your physician.  As a reference, a gallon of milk weighs 8 pounds.   Inspect the site at least twice daily. You may notice some bruising at the site and it may be tender for 1 to 2 weeks.     Increase your fluid intake for the next 2 days.    Keep arm elevated for 24 hours. For the remainder of the day, keep your arm in “Pledge of Allegiance” position when up and about.     You may drive 24 hours after the procedure unless otherwise instructed by your caregiver.  Do not operate machinery or power tools for 24 hours.  A responsible adult should be with you for the first 24 hours after you arrive home. Do not make any important legal decisions or sign legal papers for 24 hours.  Do not drink alcohol for 24 hours.    Metformin or any medications containing Metformin should not be taken for 48 hours after your procedure.      Call Your Doctor if:   You have unusual pain at the radial/ulnar (wrist) site.  You have redness, warmth, swelling, or pain at the  radial/ulnar (wrist) site.  You have drainage (other than a small amount of blood on the dressing).  `You have chills or a fever > 101.  Your arm becomes pale or dark, cool, tingly, or numb.  You develop chest pain, shortness of breath, feel faint or pass out.    You have heavy bleeding from the site, hold pressure on the site for 20 minutes.  If the bleeding stops, apply a fresh bandage and call your cardiologist.  However, if you        continue to have bleeding, call 911 and continue to apply pressure to the site.   You have any symptoms of a stroke.  Remember BE FAST  B-balance. Sudden trouble walking or loss of balance.  E-eyes.  Sudden changes in how you see or a sudden onset of a very bad headache.   F-face. Sudden weakness or loss of feeling of the face or facial droop on one side.   A-arms Sudden weakness or numbness in one arm.  One arm drifts down if they are both held out in front of you. This happens suddenly and usually on one side of the body.   S-speech.  Sudden trouble speaking, slurred speech or trouble understanding what are saying.   T-time  Time to call emergency services.  Write down the symptoms and the time they started.      [facility]  [address] [place] [address]      Coronary Angiogram (Femoral Approach) After Care     Refer to this sheet in the next few weeks. These instructions provide you with information on caring for yourself after your procedure. Your health care provider may also give you more specific instructions. Your treatment has been planned according to current medical practices, but problems sometimes occur. Call your health care provider if you have any problems or questions after your procedure.      What to Expect After the Procedure:  After your procedure, it is typical to have the following sensations:  Minor discomfort or tenderness and a small bump at the catheter insertion site. The bump should usually decrease in size and tenderness within 1  to 2 weeks.  Any bruising will usually fade within 2 to 4 weeks.    Home Care Instructions:  Do not apply powder or lotion to the site.  Do not take baths, swim, or use a hot tub until your health care provider approves and the site is completely healed.  Do not bend, squat, or lift anything over 20 lb (9 kg) or as directed by your health care provider. However, we recommend lifting nothing heavier than a gallon of milk.    You may shower 24 hours after the procedure. Remove the bandage (dressing) and gently wash the site with plain soap and water. Gently pat the site dry. You may apply a band aid daily for 2 days if desired.    Inspect the site at least twice daily.  Increase your fluid intake for the next 2 days.    Limit your activity for the first 48 hours. .    Avoid strenuous activity for 1 week or as advised by your physician.    Follow instructions about when you can drive or return to work as directed by your physician.    Hold direct pressure over the site when you cough, sneeze, laugh or change positions.  Do this for the next 2 days.    Do not operate machinery or power tools for 24 hours.  A responsible adult should be with you for the first 24 hours after you arrive home. Do not make any important legal decisions or sign legal papers for 24 hours.  Do not drink alcohol for 24 hours.  Metformin or any medications containing Metformin should not be taken for 48 hours after your procedure.      Call Your Doctor If:  You have drainage (other than a small amount of blood on the dressing).  You have chills or a fever > 101.  You have redness, warmth, swelling(larger than a walnut), or pain at the insertion site  You develop chest pain or shortness of breath, feel faint, or pass out.  You develop pain, discoloration, coldness, numbness, tingling, or severe bruising in the leg that held the catheter.  You develop bleeding from any other place, such as the bowels.  You have heavy bleeding from the site, hold  pressure on the site for 20 minutes.  If the bleeding stops, apply a fresh bandage and call your cardiologist.  However, if you continue to have bleeding, call 911.  You have any symptoms of a stroke.  Remember BE FAST  B-balance. Sudden trouble walking or loss of balance.  E-eyes.  Sudden changes in how you see or a sudden onset of a very bad headache.   F-face. Sudden weakness or loss of feeling of the face or facial droop on one side.   A-arms Sudden weakness or numbness in one arm.  One arm drifts down if they are both held out in front of you. This happens suddenly and usually on one side of the body.  S-speech.  Sudden trouble speaking, slurred speech or trouble understanding what people are saying.   T-time  Time to call emergency services.  Write down the symptoms and the time they started.        Make Sure You:  Understand these instructions.  Will watch your condition.  Will get help right away if you are not doing well or get worse.

## 2022-04-22 ENCOUNTER — TELEPHONE (OUTPATIENT)
Dept: GASTROENTEROLOGY | Facility: CLINIC | Age: 82
End: 2022-04-22

## 2022-04-22 NOTE — TELEPHONE ENCOUNTER
luis f Munroe move from 07/22 to 06/17 arrive at 140/cs- mailing out prep inst on 04/21, advised pt time is subject to change [facility] will call.

## 2022-04-25 ENCOUNTER — TELEPHONE (OUTPATIENT)
Dept: GASTROENTEROLOGY | Facility: CLINIC | Age: 82
End: 2022-04-25

## 2022-04-25 NOTE — TELEPHONE ENCOUNTER
Per Dr. Mccarthy:   Dr. Cr cleared for scope without transfusion, can we move him up in the next 1-2 weeks

## 2022-04-25 NOTE — TELEPHONE ENCOUNTER
----- Message from Gato Mccarthy MD sent at 4/25/2022  9:24 AM EDT -----  Lets get cardiology clearance before any procedures in view of the very low hemoglobin, would they recommend transfusion before procedure.

## 2022-05-02 ENCOUNTER — TRANSCRIBE ORDERS (OUTPATIENT)
Dept: ADMINISTRATIVE | Facility: HOSPITAL | Age: 82
End: 2022-05-02

## 2022-05-02 DIAGNOSIS — Z01.818 OTHER SPECIFIED PRE-OPERATIVE EXAMINATION: Primary | ICD-10-CM

## 2022-05-03 ENCOUNTER — TELEPHONE (OUTPATIENT)
Dept: GASTROENTEROLOGY | Facility: CLINIC | Age: 82
End: 2022-05-03

## 2022-05-03 DIAGNOSIS — Z01.818 PRE-OP TESTING: Primary | ICD-10-CM

## 2022-05-06 ENCOUNTER — LAB (OUTPATIENT)
Dept: LAB | Facility: HOSPITAL | Age: 82
End: 2022-05-06

## 2022-05-06 DIAGNOSIS — Z01.818 OTHER SPECIFIED PRE-OPERATIVE EXAMINATION: ICD-10-CM

## 2022-05-06 LAB — SARS-COV-2 ORF1AB RESP QL NAA+PROBE: NOT DETECTED

## 2022-05-06 PROCEDURE — C9803 HOPD COVID-19 SPEC COLLECT: HCPCS

## 2022-05-06 PROCEDURE — U0004 COV-19 TEST NON-CDC HGH THRU: HCPCS

## 2022-05-09 ENCOUNTER — ANESTHESIA EVENT (OUTPATIENT)
Dept: GASTROENTEROLOGY | Facility: HOSPITAL | Age: 82
End: 2022-05-09

## 2022-05-09 ENCOUNTER — TELEPHONE (OUTPATIENT)
Dept: GASTROENTEROLOGY | Facility: CLINIC | Age: 82
End: 2022-05-09

## 2022-05-09 ENCOUNTER — ANESTHESIA (OUTPATIENT)
Dept: GASTROENTEROLOGY | Facility: HOSPITAL | Age: 82
End: 2022-05-09

## 2022-05-09 ENCOUNTER — HOSPITAL ENCOUNTER (OUTPATIENT)
Facility: HOSPITAL | Age: 82
Setting detail: HOSPITAL OUTPATIENT SURGERY
Discharge: HOME | End: 2022-05-09
Attending: INTERNAL MEDICINE | Admitting: INTERNAL MEDICINE

## 2022-05-09 VITALS
SYSTOLIC BLOOD PRESSURE: 134 MMHG | DIASTOLIC BLOOD PRESSURE: 61 MMHG | OXYGEN SATURATION: 98 % | HEIGHT: 69 IN | HEART RATE: 57 BPM | WEIGHT: 193.4 LBS | BODY MASS INDEX: 28.64 KG/M2 | RESPIRATION RATE: 16 BRPM

## 2022-05-09 DIAGNOSIS — D50.9 IRON DEFICIENCY ANEMIA, UNSPECIFIED IRON DEFICIENCY ANEMIA TYPE: ICD-10-CM

## 2022-05-09 PROCEDURE — 43235 EGD DIAGNOSTIC BRUSH WASH: CPT | Performed by: INTERNAL MEDICINE

## 2022-05-09 PROCEDURE — 25010000002 PROPOFOL 10 MG/ML EMULSION: Performed by: NURSE ANESTHETIST, CERTIFIED REGISTERED

## 2022-05-09 PROCEDURE — 45388 COLONOSCOPY W/ABLATION: CPT | Performed by: INTERNAL MEDICINE

## 2022-05-09 RX ORDER — SODIUM CHLORIDE, SODIUM LACTATE, POTASSIUM CHLORIDE, CALCIUM CHLORIDE 600; 310; 30; 20 MG/100ML; MG/100ML; MG/100ML; MG/100ML
30 INJECTION, SOLUTION INTRAVENOUS CONTINUOUS PRN
Status: DISCONTINUED | OUTPATIENT
Start: 2022-05-09 | End: 2022-05-09 | Stop reason: HOSPADM

## 2022-05-09 RX ORDER — LIDOCAINE HYDROCHLORIDE 20 MG/ML
INJECTION, SOLUTION INFILTRATION; PERINEURAL AS NEEDED
Status: DISCONTINUED | OUTPATIENT
Start: 2022-05-09 | End: 2022-05-09 | Stop reason: SURG

## 2022-05-09 RX ORDER — PROPOFOL 10 MG/ML
VIAL (ML) INTRAVENOUS CONTINUOUS PRN
Status: DISCONTINUED | OUTPATIENT
Start: 2022-05-09 | End: 2022-05-09 | Stop reason: SURG

## 2022-05-09 RX ORDER — PROPOFOL 10 MG/ML
VIAL (ML) INTRAVENOUS AS NEEDED
Status: DISCONTINUED | OUTPATIENT
Start: 2022-05-09 | End: 2022-05-09 | Stop reason: SURG

## 2022-05-09 RX ADMIN — Medication 160 MCG/KG/MIN: at 15:31

## 2022-05-09 RX ADMIN — SODIUM CHLORIDE, POTASSIUM CHLORIDE, SODIUM LACTATE AND CALCIUM CHLORIDE 30 ML/HR: 600; 310; 30; 20 INJECTION, SOLUTION INTRAVENOUS at 15:20

## 2022-05-09 RX ADMIN — PROPOFOL 100 MG: 10 INJECTION, EMULSION INTRAVENOUS at 15:31

## 2022-05-09 RX ADMIN — LIDOCAINE HYDROCHLORIDE 60 MG: 20 INJECTION, SOLUTION INFILTRATION; PERINEURAL at 15:31

## 2022-05-09 NOTE — ANESTHESIA POSTPROCEDURE EVALUATION
"Patient: Imer Jordan    Procedure Summary     Date: 05/09/22 Room / Location:  [facility]    Anesthesia Start: 1524 Anesthesia Stop: 1610    Procedures:       COLONOSCOPY to terminal ileum with APC (N/A )      ESOPHAGOGASTRODUODENOSCOPY (N/A Esophagus) Diagnosis:       Iron deficiency anemia, unspecified iron deficiency anemia type      AVM (arteriovenous malformation) of colon      Diverticulosis      Internal hemorrhoids      Hiatal hernia      Schatzki's ring      (Iron deficiency anemia, unspecified iron deficiency anemia type [D50.9])    Surgeons: Gato Mccarthy MD Provider: Ernesto Dillard DO    Anesthesia Type: MAC ASA Status: 3          Anesthesia Type: MAC    Vitals  Vitals Value Taken Time   /61 05/09/22 1628   Temp     Pulse 57 05/09/22 1628   Resp 16 05/09/22 1628   SpO2 98 % 05/09/22 1628           Post Anesthesia Care and Evaluation    Patient location during evaluation: bedside  Patient participation: complete - patient participated  Level of consciousness: awake and alert  Pain management: adequate  Airway patency: patent  Anesthetic complications: No anesthetic complications    Cardiovascular status: acceptable  Respiratory status: acceptable  Hydration status: acceptable    Comments: /61 (BP Location: Left arm, Patient Position: Lying)   Pulse 57   Resp 16   Ht 175.3 cm (69\")   Wt 87.7 kg (193 lb 6.4 oz)   SpO2 98%   BMI 28.56 kg/m²           "

## 2022-05-09 NOTE — TELEPHONE ENCOUNTER
----- Message from Gato Mccarthy MD sent at 5/9/2022  4:07 PM EDT -----  Please arrange small bowel capsule endoscopy EGD and colonoscopy for severe iron deficiency anemia with a single cecal AVM nonbleeding treated.  Need to rule out other causes of blood loss.

## 2022-05-09 NOTE — TELEPHONE ENCOUNTER
Order placed for capsule study, msg sent to [facility] to get approval. msg sent to ADELAIDE Wright to schedule.

## 2022-05-09 NOTE — ANESTHESIA PREPROCEDURE EVALUATION
Anesthesia Evaluation     Patient summary reviewed and Nursing notes reviewed   no history of anesthetic complications:  NPO Solid Status: > 6 hours  NPO Liquid Status: > 6 hours           Airway   Mallampati: II  TM distance: >3 FB  Neck ROM: full  no difficulty expected and No difficulty expected  Dental - normal exam     Pulmonary - negative pulmonary ROS and normal exam    breath sounds clear to auscultation  (-) rhonchi, decreased breath sounds, wheezes, rales, stridor  Cardiovascular - normal exam    NYHA Classification: I  Rhythm: regular  Rate: normal    (+) hypertension, valvular problems/murmurs AI, PVD, hyperlipidemia,   (-) murmur, weak pulses, friction rub, systolic click, carotid bruits, JVD, peripheral edema      Neuro/Psych- negative ROS  GI/Hepatic/Renal/Endo    (+)  GERD,  renal disease CRI,     Musculoskeletal     Abdominal  - normal exam    Abdomen: soft.   Substance History - negative use     OB/GYN negative ob/gyn ROS         Other   arthritis, blood dyscrasia anemia,                     Anesthesia Plan    ASA 3     MAC     intravenous induction     Anesthetic plan, all risks, benefits, and alternatives have been provided, discussed and informed consent has been obtained with: patient.        CODE STATUS:

## 2022-05-09 NOTE — BRIEF OP NOTE
COLONOSCOPY, ESOPHAGOGASTRODUODENOSCOPY  Progress Note    Imer Jordan  5/9/2022    Pre-op Diagnosis:   Iron deficiency anemia, unspecified iron deficiency anemia type [D50.9]       Post-Op Diagnosis Codes:     * Iron deficiency anemia, unspecified iron deficiency anemia type [D50.9]     * AVM (arteriovenous malformation) of colon [K55.20]     * Diverticulosis [K57.90]     * Internal hemorrhoids [K64.8]     * Hiatal hernia [K44.9]     * Schatzki's ring [K22.2]    Procedure/CPT® Codes:        Procedure(s):  COLONOSCOPY to terminal ileum with APC  ESOPHAGOGASTRODUODENOSCOPY    Surgeon(s):  Gato Mccarthy MD    Anesthesia: Monitored Anesthesia Care    Staff:   Endo Technician: Roxana Wheat  Endo Nurse: Rahel Vazquez RN         Estimated Blood Loss: minimal    Urine Voided: * No values recorded between 5/9/2022  3:23 PM and 5/9/2022  4:06 PM *    Specimens:                None          Drains: * No LDAs found *    Findings: Colonoscopy to the terminal ileum with normal ileum mucosa.  A cecal AVM was identified treated with APC to eradication.  Sigmoid diverticulosis and internal hemorrhoids noted as well.  EGD with normal duodenum and esophagus.  Gastric mucosa was normal throughout with a sliding hiatal hernia and a mild Schatzki's ring noted at the GE junction.        Complications: None          Gato Mccarthy MD     Date: 5/9/2022  Time: 16:08 EDT

## 2022-05-10 ENCOUNTER — TELEPHONE (OUTPATIENT)
Dept: GASTROENTEROLOGY | Facility: CLINIC | Age: 82
End: 2022-05-10

## 2022-05-10 ENCOUNTER — OFFICE VISIT (OUTPATIENT)
Dept: CARDIAC SURGERY | Facility: CLINIC | Age: 82
End: 2022-05-10

## 2022-05-10 VITALS
OXYGEN SATURATION: 99 % | RESPIRATION RATE: 18 BRPM | DIASTOLIC BLOOD PRESSURE: 56 MMHG | BODY MASS INDEX: 28.14 KG/M2 | TEMPERATURE: 97.3 F | HEIGHT: 69 IN | WEIGHT: 190 LBS | SYSTOLIC BLOOD PRESSURE: 174 MMHG | HEART RATE: 80 BPM

## 2022-05-10 DIAGNOSIS — I77.810 ASCENDING AORTA DILATION: ICD-10-CM

## 2022-05-10 DIAGNOSIS — I35.1 NONRHEUMATIC AORTIC VALVE INSUFFICIENCY: Primary | ICD-10-CM

## 2022-05-10 DIAGNOSIS — Z98.890 HISTORY OF AAA (ABDOMINAL AORTIC ANEURYSM) REPAIR: ICD-10-CM

## 2022-05-10 DIAGNOSIS — N18.31 STAGE 3A CHRONIC KIDNEY DISEASE: ICD-10-CM

## 2022-05-10 DIAGNOSIS — I35.1 NONRHEUMATIC AORTIC VALVE REGURGITATION: ICD-10-CM

## 2022-05-10 DIAGNOSIS — I10 BENIGN ESSENTIAL HTN: ICD-10-CM

## 2022-05-10 PROCEDURE — 99205 OFFICE O/P NEW HI 60 MIN: CPT | Performed by: THORACIC SURGERY (CARDIOTHORACIC VASCULAR SURGERY)

## 2022-05-11 ENCOUNTER — PATIENT ROUNDING (BHMG ONLY) (OUTPATIENT)
Dept: CARDIAC SURGERY | Facility: CLINIC | Age: 82
End: 2022-05-11

## 2022-05-11 NOTE — PROGRESS NOTES
May 11, 2022    Hello, may I speak with Imer Jordan?    My name is Sonia    I am  with [facility]  [address]  [place] [address] [phone].    Before we get started may I verify your date of birth? 1940    I am calling to officially welcome you to our practice and ask about your recent visit. Is this a good time to talk? yes    Tell me about your visit with us. What things went well?  Everything was fine.       We're always looking for ways to make our patients' experiences even better. Do you have recommendations on ways we may improve?  no    Overall were you satisfied with your first visit to our practice? yes       I appreciate you taking the time to speak with me today. Is there anything else I can do for you? no      Thank you, and have a great day.

## 2022-05-12 NOTE — PROGRESS NOTES
5/12/2022    Seen on 5/10/2022    Reason for consultation:   Evaluation of aortic regurgitation    Chief Complaint   Vertigo    History of Present Illness:       Dear Magui and Colleagues,  It was nice to see Imer Jordan in consultation at your request. He is a 81 y.o. male with a history of hypertension, BPH, GERD, hyperlipidemia, hyperglycemia, AAA, ascending aorta dilatation and stage IIIa chronic kidney disease who evaluated recently for low blood pressure and vertigo. He denies syncope, TIA, orthopnea, PND or lower extremity edema.  As part of the work-up he had an echocardiogram that showed an ejection fraction of 55 to 60%, right ventricular cavity mild to moderate dilated, mild calcification of the aortic valve with severe restricted motion of the left coronary cusp and severe aortic regurgitation.  There was also mild to moderate mitral regurgitation and mild tricuspid regurgitation.  The aortic root was measured at 4.1 cm and ascending aorta 4.8 cm.  A cardiac cath follow and showed no evidence of obstructive coronary artery disease, mildly elevated LV filling pressures. He had a CT scan of the chest in the [facility] 3 years ago that showed a aorta at 4.5 cm in the mid ascending part.  He has no family history of aneurysms, dissections or connective tissue disorders.  He has abdominal aneurysm with small size.  He has severe hip and back pain which is limiting his mobility.  He is unable to walk more than 200 feet.  Recently he was noted to have anemia with a hemoglobin 7.5.  He is awaiting evaluation with a gastroenterologist.  He has mild bilateral lower extremity edema.  He has CKD 3.    Patient Active Problem List   Diagnosis   • Aortic valve insufficiency   • Benign essential HTN   • Benign prostatic hypertrophy   • ED (erectile dysfunction)   • Acid reflux   • HLD (hyperlipidemia)   • Dermatitis seborrheica   • Hyperglycemia   • Left lumbar radiculopathy   • History of AAA (abdominal aortic  aneurysm) repair   • Ascending aorta dilation (HCC)   • Nonrheumatic aortic valve regurgitation   • Stage 3a chronic kidney disease (HCC)   • Iron deficiency anemia       Past Medical History:   Diagnosis Date   • AAA (abdominal aortic aneurysm) without rupture (HCC)     stated in 11- [facility] notes   • Arthritis    • Ascending aorta dilation (HCC)     stated in 11- [facility] notes   • Diverticulitis, colon 09/2012   • Erectile dysfunction    • Hypertension    • Mild renal insufficiency 04/13/2018    OVERVIEW:  Noted 4/13/2018 at [facility] during AAA surgery   • Nonrheumatic aortic valve regurgitation     stated in 11- [facility] notes   • Thoracic compression fracture (HCC)        Past Surgical History:   Procedure Laterality Date   • ABDOMINAL AORTIC ANEURYSM REPAIR  2018   • CARDIAC CATHETERIZATION Left 4/21/2022    Procedure: Coronary Angiography;  Surgeon: Magui Vega MD;  Location: [facility] CATH INVASIVE LOCATION;  Service: Cardiology;  Laterality: Left;   • CARDIAC CATHETERIZATION N/A 4/21/2022    Procedure: Left Heart Cath;  Surgeon: Magui Vega MD;  Location: [facility] CATH INVASIVE LOCATION;  Service: Cardiology;  Laterality: N/A;   • COLONOSCOPY  2013    negative per pt   • COLONOSCOPY N/A 5/9/2022    Procedure: COLONOSCOPY to terminal ileum with APC;  Surgeon: Gato Mccarthy MD;  Location: [facility] ENDOSCOPY;  Service: Gastroenterology;  Laterality: N/A;  pre - anemia  post - diverticulosis, hemorrhoids, cecal avm,    • ENDOSCOPY N/A 5/9/2022    Procedure: ESOPHAGOGASTRODUODENOSCOPY;  Surgeon: Gato Mccarthy MD;  Location: [facility] ENDOSCOPY;  Service: Gastroenterology;  Laterality: N/A;  pre - anemia  post - hiatal hernia, schatzki ring   • RENAL ARTERY ENDARTERECTOMY Bilateral 04/02/2018    Done at [place] along with aortic patch       Allergies   Allergen Reactions   • Niacin Dizziness   • Spironolactone Nausea Only     Other reaction(s): Headache          Current Outpatient Medications:   •  amLODIPine-benazepril (LOTREL 5-20) 5-20 MG per capsule, Take 1 capsule by mouth Daily., Disp: 90 capsule, Rfl: 1  •  aspirin 81 MG chewable tablet, Chew., Disp: , Rfl:   •  betamethasone dipropionate (DIPROLENE) 0.05 % lotion, Apply  topically to the appropriate area as directed 2 (Two) Times a Day., Disp: 60 mL, Rfl: 2  •  clobetasol (TEMOVATE) 0.05 % cream, , Disp: , Rfl:   •  finasteride (PROSCAR) 5 MG tablet, Take 1 tablet by mouth Daily., Disp: 90 tablet, Rfl: 1  •  Glucos-MSM-C-Ap-Mafhlf-Debscx (GLUCOSAMINE MSM COMPLEX) tablet, Take by mouth., Disp: , Rfl:   •  mometasone (Elocon) 0.1 % cream, Apply  topically to the appropriate area as directed Daily., Disp: 15 g, Rfl: 0  •  Olive Leaf Extract 250 MG capsule, Take 1 capsule by mouth Daily., Disp: , Rfl:   •  Omega-3 Krill Oil 1000 MG capsule, Take by mouth., Disp: , Rfl:   •  pantoprazole (PROTONIX) 40 MG EC tablet, Take 1 tablet by mouth Daily., Disp: 90 tablet, Rfl: 3    Social History     Socioeconomic History   • Marital status:    Tobacco Use   • Smoking status: Current Some Day Smoker     Types: Cigars   • Smokeless tobacco: Never Used   • Tobacco comment: occ   Vaping Use   • Vaping Use: Never used   Substance and Sexual Activity   • Alcohol use: No   • Drug use: No   • Sexual activity: Not Currently     Partners: Female     Birth control/protection: None       Family History   Problem Relation Age of Onset   • Thyroid cancer Mother    • Cancer Father    • Stroke Father    • Arthritis Sister    • Arthritis Sister      Review of Systems   Constitutional: Positive for fatigue.   Respiratory: Negative for shortness of breath.    Cardiovascular: Positive for leg swelling. Negative for chest pain.   Genitourinary: Negative for flank pain.   Musculoskeletal: Positive for arthralgias and gait problem.   Neurological: Positive for dizziness. Negative for syncope and weakness.   All other systems reviewed and  are negative.      Physical Exam:    Vital Signs:  Weight: 86.2 kg (190 lb)   Body mass index is 28.06 kg/m².  Temp: 97.3 °F (36.3 °C)   Heart Rate: 80   BP: 174/56     Constitutional:       Appearance: Well-developed.   Eyes:      Conjunctiva/sclera: Conjunctivae normal.      Pupils: Pupils are equal, round, and reactive to light.   HENT:      Head: Normocephalic and atraumatic.      Nose: Nose normal.   Neck:      Thyroid: No thyromegaly.      Vascular: No JVD.      Lymphadenopathy: No cervical adenopathy.   Pulmonary:      Effort: Pulmonary effort is normal.      Breath sounds: Normal breath sounds. No rales.   Cardiovascular:      PMI at left midclavicular line. Normal rate. Regular rhythm.      Murmurs: There is a high frequency blowing decrescendo, early diastolic murmur at the URSB, radiating to the apex.      No gallop.   Pulses:     Intact distal pulses. No decreased pulses.   Edema:     Peripheral edema present.     Ankle: bilateral 1+ edema of the ankle.  Abdominal:      General: Bowel sounds are normal. There is no distension.      Palpations: Abdomen is soft. There is no abdominal mass.      Tenderness: There is no abdominal tenderness.   Musculoskeletal: Normal range of motion.         General: No tenderness or deformity.      Cervical back: Normal range of motion and neck supple. Skin:     General: Skin is warm and dry.      Findings: No erythema or rash.   Neurological:      Mental Status: Alert and oriented to person, place, and time.      Deep Tendon Reflexes: Reflexes are normal and symmetric.   Psychiatric:         Behavior: Behavior normal.         Relevant studies (other than HPI)        Assessment:     Problems Addressed this Visit        Cardiac and Vasculature    Aortic valve insufficiency - Primary    Benign essential HTN    History of AAA (abdominal aortic aneurysm) repair    Ascending aorta dilation (HCC)    Nonrheumatic aortic valve regurgitation       Genitourinary and Reproductive      Stage 3a chronic kidney disease (HCC)      Diagnoses       Codes Comments    Nonrheumatic aortic valve insufficiency    -  Primary ICD-10-CM: I35.1  ICD-9-CM: 424.1     Benign essential HTN     ICD-10-CM: I10  ICD-9-CM: 401.1     History of AAA (abdominal aortic aneurysm) repair     ICD-10-CM: Z98.890  ICD-9-CM: V45.89     Ascending aorta dilation (HCC)     ICD-10-CM: I77.810  ICD-9-CM: 447.71     Nonrheumatic aortic valve regurgitation     ICD-10-CM: I35.1  ICD-9-CM: 424.1     Stage 3a chronic kidney disease (HCC)     ICD-10-CM: N18.31  ICD-9-CM: 585.3           Assessment/recommendation:     Ascending aortic aneurysm and root enlargement.  Last time measured with a CT scan of the chest was 2019.  There is a pending chest CTA to further address diameter and indication for surgery.  I discussed with the patient that once we see the diameter will be in better shape to discuss surgery.  He has severe aortic regurgitation and mild pedal edema.  He fatigues but he has not developed dakota dyspnea.  She has difficulty walking due to hip issues.  He has a cardiac cath that showed no coronary artery disease.  He had a previous recommendation aortic valve replacement at the [facility].  I recommend aortic valve replacement and ascending aorta and root replacement versus repair.  Depends on the type of valve morphology he may be a candidate for aortic valve implantation most likely he will need a biologic replacement.  I had discussed the risks (STS calculated), benefits and alternatives of surgery and he wishes to proceed  CKD 3, her creatinine has been stable around 1.31.4.  He will need perioperative renal precautions including avoidance of hypotension, long bypass time, anemia and volume contraction    Thank you for allowing me to participate in his care.    Regards,    Henrique Louie M.D.  I spent over 60 minutes in reviewing the records, examining the patient, reviewing and interpreting myself the  echocardiograms, cardiac cath, CTs of the chest and reviewing previous studies in addition to the new ones, discussing the findings and options with the patient and the importance of reviewing the on coming CT scan to make the final recommendation, I discussed the operation in detail and I discussed the case with the cardiology team and spent time documenting in the electronic record

## 2022-05-17 ENCOUNTER — HOSPITAL ENCOUNTER (OUTPATIENT)
Dept: CT IMAGING | Facility: HOSPITAL | Age: 82
Discharge: HOME | End: 2022-05-17

## 2022-05-17 ENCOUNTER — TRANSCRIBE ORDERS (OUTPATIENT)
Dept: GENERAL RADIOLOGY | Facility: HOSPITAL | Age: 82
End: 2022-05-17

## 2022-05-17 ENCOUNTER — APPOINTMENT (OUTPATIENT)
Dept: RADIOLOGY | Facility: HOSPITAL | Age: 82
End: 2022-05-17

## 2022-05-17 ENCOUNTER — APPOINTMENT (OUTPATIENT)
Dept: CT IMAGING | Facility: HOSPITAL | Age: 82
End: 2022-05-17

## 2022-05-17 DIAGNOSIS — N18.9 CHRONIC KIDNEY DISEASE, UNSPECIFIED CKD STAGE: ICD-10-CM

## 2022-05-17 DIAGNOSIS — I73.9 PERIPHERAL ARTERY DISEASE: Primary | ICD-10-CM

## 2022-05-17 RX ORDER — SODIUM CHLORIDE 9 MG/ML
500 INJECTION, SOLUTION INTRAVENOUS ONCE
Status: DISCONTINUED | OUTPATIENT
Start: 2022-05-17 | End: 2022-05-20 | Stop reason: HOSPADM

## 2022-05-24 ENCOUNTER — TELEPHONE (OUTPATIENT)
Dept: FAMILY MEDICINE CLINIC | Facility: CLINIC | Age: 82
End: 2022-05-24

## 2022-05-24 DIAGNOSIS — D64.9 ANEMIA, UNSPECIFIED TYPE: Primary | ICD-10-CM

## 2022-05-24 NOTE — TELEPHONE ENCOUNTER
Spoke to patient he stated he did get 2 units of blood along with an iron transfusion and B12 while in the hospital.  His swallow study in scheduled for June 1 he said he could not get in to follow up with Dr. Mccarthy so Chandni is calling to see if there is any way they can see him this week,  Patient is aware and if not I told him we would just leave him on to see  You on Friday.  I also advised him to go to [facility] if he needed to go again patient stated understanding

## 2022-05-25 ENCOUNTER — HOSPITAL ENCOUNTER (OUTPATIENT)
Dept: CT IMAGING | Facility: HOSPITAL | Age: 82
Discharge: HOME | End: 2022-05-25

## 2022-05-25 DIAGNOSIS — I71.40 ABDOMINAL AORTIC ANEURYSM, WITHOUT RUPTURE: ICD-10-CM

## 2022-05-25 DIAGNOSIS — I73.9 PERIPHERAL ARTERIAL DISEASE: ICD-10-CM

## 2022-05-25 LAB
ALBUMIN SERPL-MCNC: 3.7 G/DL (ref 3.7–4.7)
ALBUMIN/GLOB SERPL: 1.5 {RATIO} (ref 1.2–2.2)
ALP SERPL-CCNC: 99 IU/L (ref 44–121)
ALT SERPL-CCNC: 15 IU/L (ref 0–44)
AST SERPL-CCNC: 15 IU/L (ref 0–40)
BASOPHILS # BLD AUTO: 0.1 X10E3/UL (ref 0–0.2)
BASOPHILS NFR BLD AUTO: 1 %
BILIRUB SERPL-MCNC: 0.3 MG/DL (ref 0–1.2)
BUN SERPL-MCNC: 27 MG/DL (ref 8–27)
BUN/CREAT SERPL: 15 (ref 10–24)
CALCIUM SERPL-MCNC: 8.6 MG/DL (ref 8.6–10.2)
CHLORIDE SERPL-SCNC: 101 MMOL/L (ref 96–106)
CO2 SERPL-SCNC: 23 MMOL/L (ref 20–29)
CREAT SERPL-MCNC: 1.8 MG/DL (ref 0.76–1.27)
EGFRCR SERPLBLD CKD-EPI 2021: 38 ML/MIN/1.73
EOSINOPHIL # BLD AUTO: 0.2 X10E3/UL (ref 0–0.4)
EOSINOPHIL NFR BLD AUTO: 2 %
ERYTHROCYTE [DISTWIDTH] IN BLOOD BY AUTOMATED COUNT: 20.9 % (ref 11.6–15.4)
GLOBULIN SER CALC-MCNC: 2.4 G/DL (ref 1.5–4.5)
GLUCOSE SERPL-MCNC: 125 MG/DL (ref 65–99)
HCT VFR BLD AUTO: 29.4 % (ref 37.5–51)
HGB BLD-MCNC: 8.7 G/DL (ref 13–17.7)
IMM GRANULOCYTES # BLD AUTO: 0 X10E3/UL (ref 0–0.1)
IMM GRANULOCYTES NFR BLD AUTO: 0 %
LYMPHOCYTES # BLD AUTO: 0.8 X10E3/UL (ref 0.7–3.1)
LYMPHOCYTES NFR BLD AUTO: 11 %
MCH RBC QN AUTO: 21.2 PG (ref 26.6–33)
MCHC RBC AUTO-ENTMCNC: 29.6 G/DL (ref 31.5–35.7)
MCV RBC AUTO: 72 FL (ref 79–97)
MONOCYTES # BLD AUTO: 0.4 X10E3/UL (ref 0.1–0.9)
MONOCYTES NFR BLD AUTO: 6 %
NEUTROPHILS # BLD AUTO: 5.9 X10E3/UL (ref 1.4–7)
NEUTROPHILS NFR BLD AUTO: 80 %
PLATELET # BLD AUTO: 227 X10E3/UL (ref 150–450)
POTASSIUM SERPL-SCNC: 4.3 MMOL/L (ref 3.5–5.2)
PROT SERPL-MCNC: 6.1 G/DL (ref 6–8.5)
RBC # BLD AUTO: 4.11 X10E6/UL (ref 4.14–5.8)
SODIUM SERPL-SCNC: 138 MMOL/L (ref 134–144)
WBC # BLD AUTO: 7.4 X10E3/UL (ref 3.4–10.8)

## 2022-05-25 PROCEDURE — 75635 CT ANGIO ABDOMINAL ARTERIES: CPT

## 2022-05-25 PROCEDURE — 0 IOPAMIDOL PER 1 ML: Performed by: INTERNAL MEDICINE

## 2022-05-25 PROCEDURE — 71275 CT ANGIOGRAPHY CHEST: CPT

## 2022-05-25 RX ADMIN — IOPAMIDOL 95 ML: 755 INJECTION, SOLUTION INTRAVENOUS at 13:34

## 2022-06-01 DIAGNOSIS — I10 BENIGN ESSENTIAL HTN: ICD-10-CM

## 2022-06-01 RX ORDER — AMLODIPINE BESYLATE AND BENAZEPRIL HYDROCHLORIDE 5; 20 MG/1; MG/1
CAPSULE ORAL
Qty: 90 CAPSULE | Refills: 1 | Status: SHIPPED | OUTPATIENT
Start: 2022-06-01 | End: 2022-11-08 | Stop reason: HOSPADM

## 2022-06-06 ENCOUNTER — TELEPHONE (OUTPATIENT)
Dept: CARDIAC SURGERY | Facility: CLINIC | Age: 82
End: 2022-06-06

## 2022-06-10 ENCOUNTER — TELEPHONE (OUTPATIENT)
Dept: CARDIAC SURGERY | Facility: CLINIC | Age: 82
End: 2022-06-10

## 2022-06-10 NOTE — TELEPHONE ENCOUNTER
Called pt to let him know Dr. Louie has reviewed all testing and is recommending he proceed with surgery. Per pt, he has been having issues with anemia and is being worked up through [facility]. Pt has appt with Dr. Louie on 6/28 and would like to discuss surgery again prior to scheduling.

## 2022-06-15 ENCOUNTER — TELEPHONE (OUTPATIENT)
Dept: FAMILY MEDICINE CLINIC | Facility: CLINIC | Age: 82
End: 2022-06-15

## 2022-06-16 ENCOUNTER — TELEPHONE (OUTPATIENT)
Dept: GASTROENTEROLOGY | Facility: CLINIC | Age: 82
End: 2022-06-16

## 2022-06-27 ENCOUNTER — TELEPHONE (OUTPATIENT)
Dept: GASTROENTEROLOGY | Facility: CLINIC | Age: 82
End: 2022-06-27

## 2022-06-27 NOTE — TELEPHONE ENCOUNTER
Caller: Imer Jordan    Relationship: Self  Best call back number: [phone]  What is the best time to reach you: ANYTIME, CAN LVM    Who are you requesting to speak with (clinical staff, provider,  specific staff member): CLINICAL STAFF    Do you know the name of the person who called: IMER     What was the call regarding: PT HAD A CAPSULE PLACED ON THE 06/16/22 WAS CALLING REGARDING THE RESULTS AND WONDERING IF THEY WERE IN YET.     Do you require a callback: YES

## 2022-06-28 ENCOUNTER — TELEPHONE (OUTPATIENT)
Dept: CARDIAC SURGERY | Facility: CLINIC | Age: 82
End: 2022-06-28

## 2022-06-28 ENCOUNTER — OFFICE VISIT (OUTPATIENT)
Dept: CARDIAC SURGERY | Facility: CLINIC | Age: 82
End: 2022-06-28

## 2022-06-28 VITALS
WEIGHT: 180 LBS | OXYGEN SATURATION: 99 % | SYSTOLIC BLOOD PRESSURE: 191 MMHG | RESPIRATION RATE: 20 BRPM | HEART RATE: 63 BPM | TEMPERATURE: 98.2 F | BODY MASS INDEX: 26.66 KG/M2 | HEIGHT: 69 IN | DIASTOLIC BLOOD PRESSURE: 71 MMHG

## 2022-06-28 DIAGNOSIS — Z98.890 HISTORY OF AAA (ABDOMINAL AORTIC ANEURYSM) REPAIR: ICD-10-CM

## 2022-06-28 DIAGNOSIS — I35.1 NONRHEUMATIC AORTIC VALVE INSUFFICIENCY: ICD-10-CM

## 2022-06-28 DIAGNOSIS — I10 BENIGN ESSENTIAL HTN: ICD-10-CM

## 2022-06-28 DIAGNOSIS — I35.1 NONRHEUMATIC AORTIC VALVE REGURGITATION: ICD-10-CM

## 2022-06-28 DIAGNOSIS — I77.810 ASCENDING AORTA DILATION: ICD-10-CM

## 2022-06-28 DIAGNOSIS — N18.31 STAGE 3A CHRONIC KIDNEY DISEASE: ICD-10-CM

## 2022-06-28 DIAGNOSIS — D50.9 IRON DEFICIENCY ANEMIA, UNSPECIFIED IRON DEFICIENCY ANEMIA TYPE: Primary | ICD-10-CM

## 2022-06-28 PROCEDURE — 99024 POSTOP FOLLOW-UP VISIT: CPT | Performed by: THORACIC SURGERY (CARDIOTHORACIC VASCULAR SURGERY)

## 2022-06-30 LAB
BASOPHILS # BLD AUTO: 0 X10E3/UL (ref 0–0.2)
BASOPHILS NFR BLD AUTO: 0 %
EOSINOPHIL # BLD AUTO: 0.2 X10E3/UL (ref 0–0.4)
EOSINOPHIL NFR BLD AUTO: 3 %
ERYTHROCYTE [DISTWIDTH] IN BLOOD BY AUTOMATED COUNT: 24.4 % (ref 11.6–15.4)
HCT VFR BLD AUTO: 33.2 % (ref 37.5–51)
HGB BLD-MCNC: 10.3 G/DL (ref 13–17.7)
IMM GRANULOCYTES # BLD AUTO: 0 X10E3/UL (ref 0–0.1)
IMM GRANULOCYTES NFR BLD AUTO: 0 %
LYMPHOCYTES # BLD AUTO: 0.8 X10E3/UL (ref 0.7–3.1)
LYMPHOCYTES NFR BLD AUTO: 15 %
MCH RBC QN AUTO: 22.8 PG (ref 26.6–33)
MCHC RBC AUTO-ENTMCNC: 31 G/DL (ref 31.5–35.7)
MCV RBC AUTO: 74 FL (ref 79–97)
MONOCYTES # BLD AUTO: 0.5 X10E3/UL (ref 0.1–0.9)
MONOCYTES NFR BLD AUTO: 9 %
MORPHOLOGY BLD-IMP: ABNORMAL
NEUTROPHILS # BLD AUTO: 4.2 X10E3/UL (ref 1.4–7)
NEUTROPHILS NFR BLD AUTO: 73 %
PLATELET # BLD AUTO: 209 X10E3/UL (ref 150–450)
RBC # BLD AUTO: 4.51 X10E6/UL (ref 4.14–5.8)
WBC # BLD AUTO: 5.7 X10E3/UL (ref 3.4–10.8)

## 2022-07-01 NOTE — PROGRESS NOTES
[facility] note  I saw Mr. Jordan in follow-up regarding his severe aortic insufficiency and ascending aortic aneurysm.  I saw Mr. Zavala at last May and I discussed with him my recommendation of aortic valve replacement based of his severe aortic regurgitation and symptoms.  He had a previous recommendation of aortic valve replacement in the past at the [facility].  He also has an ascending aorta and root aneurysm which has a diameter of approximate 5.1 cm.  He had anemia but he had a GI work-up.  His creatinine is 1.3.  Again, I recommend an aortic valve replacement and ascending and possible root replacement versus repair.  I discussed the risks, benefits and terms of surgery and he wishes to proceed.  He will need a cardiac cath before surgery on the routine pulmonary and other evaluation preoperative.

## 2022-07-06 ENCOUNTER — PREP FOR SURGERY (OUTPATIENT)
Dept: OTHER | Facility: HOSPITAL | Age: 82
End: 2022-07-06

## 2022-07-06 DIAGNOSIS — R93.3 ABNORMAL FINDINGS ON DIAGNOSTIC IMAGING OF OTHER PARTS OF DIGESTIVE TRACT: ICD-10-CM

## 2022-07-06 DIAGNOSIS — I77.810 ASCENDING AORTA DILATION: ICD-10-CM

## 2022-07-06 DIAGNOSIS — I35.1 NONRHEUMATIC AORTIC VALVE INSUFFICIENCY: Primary | ICD-10-CM

## 2022-07-06 DIAGNOSIS — R79.1 ABNORMAL COAGULATION PROFILE: ICD-10-CM

## 2022-07-06 DIAGNOSIS — R79.9 ABNORMAL FINDING OF BLOOD CHEMISTRY, UNSPECIFIED: ICD-10-CM

## 2022-07-06 DIAGNOSIS — I50.9 HEART FAILURE, UNSPECIFIED HF CHRONICITY, UNSPECIFIED HEART FAILURE TYPE: ICD-10-CM

## 2022-07-06 DIAGNOSIS — I79.8 OTHER DISORDERS OF ARTERIES, ARTERIOLES AND CAPILLARIES IN DISEASES CLASSIFIED ELSEWHERE: ICD-10-CM

## 2022-07-06 RX ORDER — CEFAZOLIN SODIUM 2 G/100ML
2 INJECTION, SOLUTION INTRAVENOUS
Status: CANCELLED | OUTPATIENT
Start: 2022-07-07 | End: 2022-07-08

## 2022-07-06 RX ORDER — CHLORHEXIDINE GLUCONATE 0.12 MG/ML
15 RINSE ORAL ONCE
Status: CANCELLED | OUTPATIENT
Start: 2022-07-06 | End: 2022-07-06

## 2022-07-06 RX ORDER — DEXTROSE MONOHYDRATE 25 G/50ML
10-50 INJECTION, SOLUTION INTRAVENOUS
Status: CANCELLED | OUTPATIENT
Start: 2022-07-06

## 2022-07-06 RX ORDER — NICOTINE POLACRILEX 4 MG
15 LOZENGE BUCCAL
Status: CANCELLED | OUTPATIENT
Start: 2022-07-06

## 2022-07-06 RX ORDER — CHLORHEXIDINE GLUCONATE 500 MG/1
1 CLOTH TOPICAL EVERY 12 HOURS PRN
Status: CANCELLED | OUTPATIENT
Start: 2022-07-06

## 2022-07-06 RX ORDER — SODIUM CHLORIDE 9 MG/ML
30 INJECTION, SOLUTION INTRAVENOUS CONTINUOUS PRN
Status: CANCELLED | OUTPATIENT
Start: 2022-07-06

## 2022-07-06 RX ORDER — SODIUM CHLORIDE 0.9 % (FLUSH) 0.9 %
30 SYRINGE (ML) INJECTION ONCE AS NEEDED
Status: CANCELLED | OUTPATIENT
Start: 2022-07-06

## 2022-07-06 RX ORDER — CHLORHEXIDINE GLUCONATE 0.12 MG/ML
15 RINSE ORAL EVERY 12 HOURS
Status: CANCELLED | OUTPATIENT
Start: 2022-07-06 | End: 2022-07-07

## 2022-07-13 ENCOUNTER — OFFICE VISIT (OUTPATIENT)
Dept: FAMILY MEDICINE CLINIC | Facility: CLINIC | Age: 82
End: 2022-07-13

## 2022-07-13 VITALS
RESPIRATION RATE: 18 BRPM | OXYGEN SATURATION: 97 % | DIASTOLIC BLOOD PRESSURE: 60 MMHG | WEIGHT: 184 LBS | HEIGHT: 69 IN | BODY MASS INDEX: 27.25 KG/M2 | HEART RATE: 67 BPM | SYSTOLIC BLOOD PRESSURE: 142 MMHG

## 2022-07-13 DIAGNOSIS — Z00.00 MEDICARE ANNUAL WELLNESS VISIT, SUBSEQUENT: Primary | ICD-10-CM

## 2022-07-13 DIAGNOSIS — Z23 NEED FOR VACCINATION: ICD-10-CM

## 2022-07-13 DIAGNOSIS — I10 BENIGN ESSENTIAL HTN: ICD-10-CM

## 2022-07-13 DIAGNOSIS — R73.9 HYPERGLYCEMIA: ICD-10-CM

## 2022-07-13 DIAGNOSIS — D50.0 IRON DEFICIENCY ANEMIA DUE TO CHRONIC BLOOD LOSS: ICD-10-CM

## 2022-07-13 DIAGNOSIS — E78.2 MIXED HYPERLIPIDEMIA: ICD-10-CM

## 2022-07-13 PROCEDURE — 91305 COVID-19 (PFIZER) 12+ YRS: CPT | Performed by: FAMILY MEDICINE

## 2022-07-13 PROCEDURE — G0439 PPPS, SUBSEQ VISIT: HCPCS | Performed by: FAMILY MEDICINE

## 2022-07-13 PROCEDURE — 96160 PT-FOCUSED HLTH RISK ASSMT: CPT | Performed by: FAMILY MEDICINE

## 2022-07-13 PROCEDURE — 0051A COVID-19 (PFIZER) 12+ YRS: CPT | Performed by: FAMILY MEDICINE

## 2022-07-13 RX ORDER — DOXYCYCLINE HYCLATE 50 MG/1
324 CAPSULE, GELATIN COATED ORAL
COMMUNITY
Start: 2022-05-18

## 2022-07-13 NOTE — PROGRESS NOTES
"Subsequent Medicare Wellness Visit    Chief Complaint   Patient presents with   • Annual Exam      Subjective    History of Present Illness:  Imer Jordan is a 81 y.o. male who presents for a Subsequent Medicare Wellness Visit.      Compared to one year ago, the patient feels his physical health is worse due to his recent anemia and heart issues and his mental health is the same. He is appropriately nervous about his impending valve replacement.  Recent Hospitalizations:  He was admitted within the past 365 days at [facility] with severe anemia from a GI bleed requiring transfusion, the cause of which was not found but appears to have stabilized.     Current Medical Providers:  Patient Care Team:  Ann Marie Brizuela MD as PCP - General  No opioid medication identified on active medication list. I have reviewed chart for other potential  high risk medication/s and harmful drug interactions in the elderly.    Aspirin is on active medication list. Aspirin use is indicated based on review of current medical condition/s. Pros and cons of this therapy have not been discussed today since this is prescribed by his cardiologist. Benefits of this medication outweigh potential harm.    Advance Care Planning   Advance Directive is on file.  ACP discussion was held with the patient during this visit. Patient has an advance directive in EMR which is still valid.   Objective    /60   Pulse 67   Resp 18   Ht 175.3 cm (69\")   Wt 83.5 kg (184 lb)   SpO2 97%   BMI 27.17 kg/m²   Body mass index is 27.17 kg/m².  BMI Readings from Last 1 Encounters:   07/13/22 27.17 kg/m²   BMI is above normal parameters. Recommendations include: He is slowly increasing his walking -- couldn't for a long time due to his anemia and his back issue is much better as well.     Does the patient have evidence of cognitive impairment? No  Physical Exam  Vitals reviewed.   Constitutional:       Appearance: Normal appearance.   HENT:      Head:    "   Comments: Mask in place.  Cardiovascular:      Rate and Rhythm: Normal rate and regular rhythm.      Heart sounds: Murmur (2/6 RUSB blowing holosystolic crescendo murmur) heard.   Pulmonary:      Effort: Pulmonary effort is normal.      Breath sounds: Normal breath sounds. No wheezing.   Neurological:      Mental Status: He is alert and oriented to person, place, and time.   Psychiatric:         Mood and Affect: Mood normal.         Behavior: Behavior normal.         Thought Content: Thought content normal.         Judgment: Judgment normal.       LABS:  He is getting all necessary labs soon for his open heart surgery.  HEALTH RISK ASSESSMENT  Smoking Status:  Social History     Tobacco Use   Smoking Status Passive Smoke Exposure - Never Smoker   Smokeless Tobacco Never Used   Tobacco Comment    occ     Alcohol Consumption:  Social History     Substance and Sexual Activity   Alcohol Use No     Fall Risk Screen:  STEADI Fall Risk Assessment was completed, and patient is at LOW risk for falls.Assessment completed on:3/16/2022  Depression Screening:  PHQ-2/PHQ-9 Depression Screening 7/13/2022   Retired PHQ-9 Total Score -   Retired Total Score -   Little Interest or Pleasure in Doing Things 0-->not at all   Feeling Down, Depressed or Hopeless 0-->not at all   PHQ-9: Brief Depression Severity Measure Score 0     Health Habits and Functional and Cognitive Screening:  Functional & Cognitive Status 7/13/2022   Do you have difficulty preparing food and eating? No   Do you have difficulty bathing yourself, getting dressed or grooming yourself? No   Do you have difficulty using the toilet? No   Do you have difficulty moving around from place to place? No   Do you have trouble with steps or getting out of a bed or a chair? No   Current Diet Well Balanced Diet   Dental Exam Not up to date   Eye Exam Up to date   Exercise (times per week) 3 times per week   Current Exercises Include Walking   Current Exercise Activities  Include -   Do you need help using the phone?  No   Are you deaf or do you have serious difficulty hearing?  No   Do you need help with transportation? No   Do you need help shopping? No   Do you need help preparing meals?  No   Do you need help with housework?  No   Do you need help with laundry? No   Do you need help taking your medications? No   Do you need help managing money? No   Do you ever drive or ride in a car without wearing a seat belt? No   Have you felt unusual stress, anger or loneliness in the last month? No   Who do you live with? Spouse   If you need help, do you have trouble finding someone available to you? No   Have you been bothered in the last four weeks by sexual problems? No   Do you have difficulty concentrating, remembering or making decisions? No       Health Habits  Current Diet: Well Balanced Diet  Dental Exam: Not up to date  Eye Exam: Up to date  Exercise (times per week): 3 times per week  Current Exercises Include: Walking  Age-appropriate Screening Schedule:  Refer to the list below for future screening recommendations based on patient's age, sex and/or medical conditions. Orders for these recommended tests are listed in the plan section. The patient has been provided with a written plan.  Health Maintenance   Topic Date Due   • ZOSTER VACCINE (2 of 3) 06/26/2017   • INFLUENZA VACCINE  10/01/2022   • LIPID PANEL  03/29/2023   • TDAP/TD VACCINES (3 - Td or Tdap) 05/01/2027          Assessment & Plan   CMS Preventative Services Quick Reference  Risk Factors Identified During Encounter  Cardiovascular Disease  Immunizations Discussed/Encouraged (specific Immunizations; COVID19  The above risks/problems have been discussed with the patient.  Follow up actions/plans if indicated are seen below in the Assessment/Plan Section.  Pertinent information has been shared with the patient in the After Visit Summary.  Problem List Items Addressed This Visit        Cardiac and Vasculature     Benign essential HTN    Overview     [facility] 7/13/2022  BP is up a little here today. He will recheck in six months. He will continue present medications and per nephrology who has made some med adjustments.            HLD (hyperlipidemia)    Overview     [facility] 7/13/2022 He will have lipids prior to his surgery in August.     Lab Results   Component Value Date    CHLPL 104 08/09/2021    LDL 56 08/09/2021    HDL 35 (L) 08/09/2021    TRIG 55 08/09/2021                  Endocrine and Metabolic    Hyperglycemia    Overview     [facility] 7/13/2022  Labs prior to surgery. Likely stable.  Lab Results   Component Value Date    HGBA1C 5.60 04/29/2021    HGBA1C 5.70 (H) 05/28/2020    HGBA1C 5.60 07/19/2019    HGBA1C 5.6 07/16/2018                  Hematology and Neoplasia    Iron deficiency anemia    Relevant Medications    ferrous gluconate (FERGON) 324 MG tablet      Other Visit Diagnoses     Medicare annual wellness visit, subsequent    -  Primary    Need for vaccination        Relevant Orders    COVID-19 Vaccine (Pfizer) Gray Cap (Completed)        Follow Up:   No follow-ups on file.   An After Visit Summary and PPPS were given/sent to the patient.

## 2022-07-18 ENCOUNTER — OFFICE VISIT (OUTPATIENT)
Dept: CARDIOLOGY | Facility: CLINIC | Age: 82
End: 2022-07-18

## 2022-07-18 VITALS
WEIGHT: 182 LBS | RESPIRATION RATE: 16 BRPM | HEART RATE: 61 BPM | DIASTOLIC BLOOD PRESSURE: 62 MMHG | BODY MASS INDEX: 26.96 KG/M2 | HEIGHT: 69 IN | SYSTOLIC BLOOD PRESSURE: 136 MMHG | OXYGEN SATURATION: 98 %

## 2022-07-18 DIAGNOSIS — I35.1 NONRHEUMATIC AORTIC VALVE INSUFFICIENCY: Primary | ICD-10-CM

## 2022-07-18 PROCEDURE — 99213 OFFICE O/P EST LOW 20 MIN: CPT | Performed by: INTERNAL MEDICINE

## 2022-07-18 NOTE — PROGRESS NOTES
Subjective:     Encounter Date:07/18/22      Patient ID: Imer Jordan is a 81 y.o. male.    Chief Complaint: Establish care, AI, PAD, AAA, ascending aneurysm  HPI:   This is a 81-year-old man who presents for evaluation.  He has an extensive cardiovascular history.  He has known severe aortic regurgitation by LIN in 2019.  In 2018 he underwent AAA repair with juxtarenal aneurysm repair with a Hemashield gold graft with end and replacement of both hypogastric arteries after hypogastric artery endarterectomy.  Proximal aortic endarterectomy attaching the external iliac artery end-to-side into each limb of the aortobiiliac graft endarterectomy of the SUSSY with reimplantation of Carrel patch, endarterectomy of the left accessory renal artery.  Left and right cardiac catheterization in 2019 showed no evidence of obstructive coronary disease.    All of his above work-up was performed at the clinic clinic.  When he saw the cardiologist in 2019 they scheduled a follow-up stress echo but also noted the need for aortic valve replacement.  I met him in April 2022. He complained of dyspnea and fatigue. He was anemic and iron saturation was 5%. I performed a cardiac cath showing no CAD. He has seen Dr. Louie and will be getting AVR with possible aortic root repair in August 2022. He was recently hospitalized for severe anemia at [facility]. His Hg has improved and he feels substantially better now in terms of energy and breathing.     The very pleasant man who is .  He has a good understanding of his medical conditions.  He occasionally smokes cigars.  He does not drink alcohol.  He has a history of CKD stage III and sees Jason daily.  He has not seen a vascular surgeon in town.    The following portions of the patient's history were reviewed and updated as appropriate: allergies, current medications, past family history, past medical history, past social history, past surgical history and problem list.     REVIEW OF  SYSTEMS:   All systems reviewed.  Pertinent positives identified in HPI.  All other systems are negative.    Past Medical History:   Diagnosis Date   • AAA (abdominal aortic aneurysm) without rupture (HCC)     stated in 11- [facility] notes   • Arthritis    • Ascending aorta dilation (HCC)     stated in 11- [facility] notes   • Diverticulitis, colon 09/2012   • Erectile dysfunction    • GERD (gastroesophageal reflux disease)    • Hypertension    • Mild renal insufficiency 04/13/2018    OVERVIEW:  Noted 4/13/2018 at [facility] during AAA surgery   • Nonrheumatic aortic valve regurgitation     stated in 11- [facility] notes   • Thoracic compression fracture (HCC)        Family History   Problem Relation Age of Onset   • Thyroid cancer Mother    • Cancer Father    • Stroke Father    • Arthritis Sister    • Arthritis Sister        Social History     Socioeconomic History   • Marital status:    Tobacco Use   • Smoking status: Passive Smoke Exposure - Never Smoker   • Smokeless tobacco: Never Used   • Tobacco comment: occ   Vaping Use   • Vaping Use: Never used   Substance and Sexual Activity   • Alcohol use: No   • Drug use: No   • Sexual activity: Not Currently     Partners: Female     Birth control/protection: None       Allergies   Allergen Reactions   • Niacin Dizziness   • Spironolactone Nausea Only     Other reaction(s): Headache       Past Surgical History:   Procedure Laterality Date   • ABDOMINAL AORTIC ANEURYSM REPAIR  2018   • CARDIAC CATHETERIZATION Left 4/21/2022    Procedure: Coronary Angiography;  Surgeon: Magui Vega MD;  Location:  [facility];  Service: Cardiology;  Laterality: Left;   • CARDIAC CATHETERIZATION N/A 4/21/2022    Procedure: Left Heart Cath;  Surgeon: Magui Vega MD;  Location:  [facility];  Service: Cardiology;  Laterality: N/A;   • COLONOSCOPY  2013    negative per pt   • COLONOSCOPY N/A 5/9/2022     Procedure: COLONOSCOPY to terminal ileum with APC;  Surgeon: Gato Mccarthy MD;  Location: [facility] ENDOSCOPY;  Service: Gastroenterology;  Laterality: N/A;  pre - anemia  post - diverticulosis, hemorrhoids, cecal avm,    • ENDOSCOPY N/A 5/9/2022    Procedure: ESOPHAGOGASTRODUODENOSCOPY;  Surgeon: Gato Mccarthy MD;  Location: [facility] ENDOSCOPY;  Service: Gastroenterology;  Laterality: N/A;  pre - anemia  post - hiatal hernia, schatzki ring   • RENAL ARTERY ENDARTERECTOMY Bilateral 04/02/2018    Done at [facility] along with aortic patch       Procedures     Objective:         PHYSICAL EXAM:  GEN: VSS, no distress,   Eyes: normal sclera, normal lids and lashes  HENT: moist mucus membranes,   Respiratory: CTAB, no rales or wheezes  CV: RRR, loud blowing 4 out of 6 aortic insufficiency murmur throughout the precordium radiating into the abdomen and carotids, +2 DP and 2+ carotid pulses b/l  GI: NABS, soft,  Nontender, nondistended  MSK: no edema, no scoliosis or kyphosis  Skin: no rash, warm, dry  Heme/Lymph: no bruising or bleeding  Psych: organized thought, normal behavior and affect  Neuro: Cranial nerves grossly intact, Alert and Oriented x 3.         Assessment:         No diagnosis found.       Plan:         1.  Severe AI: Plan for AVR in August 2022  2.  Ascending aortic aneurysm: Last measured 4.7cm May 2022.  3.  Peripheral arterial disease: Extensive AAA repair with iliac end-to-end anastomosis.  I will get a chest, abdomen, pelvis runoff CT angiogram to assess his aorta.  He has severe back and hip claudication over the last few months which has limited his walking to about 200 feet.  His DP pulses are strong bilaterally.  He will need to see the vascular surgeon to establish care here.  4.  Iron deficiency anemia: Improving  5.  Dizziness:resovled  6.  CKD stage III: Sees Jason Gómez.  I have contacted him to discuss the CT angiogram prehydration    Dr. Brizuela, thank you very much for referring  this kind patient to me. Please call me with any questions or concerns. I will see the patient again in the hospital at the time of surgery.       Magui Vega MD  07/18/22  [facility]    Outpatient Encounter Medications as of 7/18/2022   Medication Sig Dispense Refill   • amLODIPine-benazepril (LOTREL 5-20) 5-20 MG per capsule TAKE 1 CAPSULE EVERY DAY 90 capsule 1   • aspirin 81 MG chewable tablet Chew.     • betamethasone dipropionate (DIPROLENE) 0.05 % lotion Apply  topically to the appropriate area as directed 2 (Two) Times a Day. 60 mL 2   • clobetasol (TEMOVATE) 0.05 % cream      • ferrous gluconate (FERGON) 324 MG tablet Take 324 mg by mouth.     • finasteride (PROSCAR) 5 MG tablet Take 1 tablet by mouth Daily. 90 tablet 1   • Glucos-MSM-C-Fj-Zmthit-Lbmiww (GLUCOSAMINE MSM COMPLEX) tablet Take by mouth.     • mometasone (Elocon) 0.1 % cream Apply  topically to the appropriate area as directed Daily. 15 g 0   • Olive Leaf Extract 250 MG capsule Take 1 capsule by mouth Daily.     • Omega-3 Krill Oil 1000 MG capsule Take by mouth.     • pantoprazole (PROTONIX) 40 MG EC tablet Take 1 tablet by mouth Daily. 90 tablet 3     No facility-administered encounter medications on file as of 7/18/2022.      Attending MD Zimmer:   I personally have seen and examined this patient.  Physician assistant note reviewed and agree on plan of care and except where noted.  See MDM for details.

## 2022-07-20 ENCOUNTER — TELEPHONE (OUTPATIENT)
Dept: CARDIAC SURGERY | Facility: CLINIC | Age: 82
End: 2022-07-20

## 2022-07-20 NOTE — TELEPHONE ENCOUNTER
Spoke to patient with PAT and surgery information. PAT is on 8- at 0830 with any testing to follow.  Surgery is on 8- at 0730 with an 0500 arrival time.  He was instructed to stop his FISH OIL 10 days prior to surgery. Last dose 8-.  Neuromonitoring has been ordered by e-mail with Camilo.  He was instructed to call the office with any further questions.

## 2022-07-26 PROCEDURE — 91110 GI TRC IMG INTRAL ESOPH-ILE: CPT | Performed by: INTERNAL MEDICINE

## 2022-08-02 DIAGNOSIS — N40.1 BENIGN PROSTATIC HYPERPLASIA WITH LOWER URINARY TRACT SYMPTOMS, SYMPTOM DETAILS UNSPECIFIED: ICD-10-CM

## 2022-08-02 RX ORDER — FINASTERIDE 5 MG/1
TABLET, FILM COATED ORAL
Qty: 90 TABLET | Refills: 1 | Status: SHIPPED | OUTPATIENT
Start: 2022-08-02

## 2022-08-10 ENCOUNTER — TELEPHONE (OUTPATIENT)
Dept: CARDIAC SURGERY | Facility: CLINIC | Age: 82
End: 2022-08-10

## 2022-08-10 NOTE — TELEPHONE ENCOUNTER
Caller: ALLISON TURNER    Relationship: SELF    Best call back number: [phone]    What is the best time to reach you: ANY    Who are you requesting to speak with (clinical staff, provider,  specific staff member): CLINICAL STAFF    Do you know the name of the person who called: ALLISON    What was the call regarding: PATIENT IS HAVING SURGERY ON 8/22/22 WITH DR SMALL, AND HE IS WANTING TO KNOW IF HE WILL  BE GETTING INSTRUCTIONS IN THE MAIL REGARDING WHAT HE CAN AND CAN'T DO BEFORE AND AFTER SURGERY. HE IS ALSO WANTING TO KNOW THE PRE-OP AND POST-OP CARE/PLANS. THANK YOU!    Do you require a callback: YES

## 2022-08-11 NOTE — TELEPHONE ENCOUNTER
Returned pt's call regarding pre and post op care. Answered all questions to pt's satisfaction. Mailing informational pamphlet to pt today. Pt will call back with any questions.

## 2022-08-15 ENCOUNTER — TELEPHONE (OUTPATIENT)
Dept: CARDIAC SURGERY | Facility: CLINIC | Age: 82
End: 2022-08-15

## 2022-08-15 NOTE — TELEPHONE ENCOUNTER
Patient called in stating that his wife is hospital at this time and he will need to cancel his surgery.  He will reschedule at a later date.  I will call him mid September to check on the status of rescheduling. Surgery< PAT and carotids have been cancelled.

## 2022-08-19 ENCOUNTER — APPOINTMENT (OUTPATIENT)
Dept: PREADMISSION TESTING | Facility: HOSPITAL | Age: 82
End: 2022-08-19

## 2022-08-19 ENCOUNTER — APPOINTMENT (OUTPATIENT)
Dept: CARDIOLOGY | Facility: HOSPITAL | Age: 82
End: 2022-08-19

## 2022-09-06 DIAGNOSIS — L21.9 DERMATITIS SEBORRHEICA: ICD-10-CM

## 2022-09-06 RX ORDER — BETAMETHASONE DIPROPIONATE 0.5 MG/G
LOTION TOPICAL
Qty: 60 ML | Refills: 2 | Status: SHIPPED | OUTPATIENT
Start: 2022-09-06

## 2022-09-12 ENCOUNTER — TELEPHONE (OUTPATIENT)
Dept: FAMILY MEDICINE CLINIC | Facility: CLINIC | Age: 82
End: 2022-09-12

## 2022-09-12 RX ORDER — CHLORTHALIDONE 25 MG/1
25 TABLET ORAL DAILY
Qty: 90 TABLET | Refills: 1 | Status: SHIPPED | OUTPATIENT
Start: 2022-09-12 | End: 2022-11-08 | Stop reason: HOSPADM

## 2022-09-12 NOTE — TELEPHONE ENCOUNTER
Wants a small supply sent to [facility]  Chlorthalidone 25 MG    Prescription requested 8/30 not filled and he only has 6 left    Please call patient with update.

## 2022-09-19 ENCOUNTER — TELEPHONE (OUTPATIENT)
Dept: CARDIAC SURGERY | Facility: CLINIC | Age: 82
End: 2022-09-19

## 2022-09-19 NOTE — TELEPHONE ENCOUNTER
----- Message from INGE Shaw sent at 9/19/2022  1:38 PM EDT -----  Regarding: RE: 81 asa  He can continue to take the aspirin 81mg up until the day before his surgery.  No meds the morning of his surgery.      Thank You    ----- Message -----  From: Danay Neal RegSched Rep  Sent: 9/19/2022   1:33 PM EDT  To: INGE Shaw  Subject: 81 asa                                           Pt is scheduled for avr/aneurysm repair with Dr. Louie on 11/02/2022 we were told to hold fish oil 10days prior, pt is asking about 81 mg asa should he hold that as well?

## 2022-09-19 NOTE — TELEPHONE ENCOUNTER
Called pt and advised per Radha He can continue to take the aspirin 81mg up until the day before his surgery.  No meds the morning of his surgery.  he understood and agreed.

## 2022-09-19 NOTE — TELEPHONE ENCOUNTER
Called pt and advised surgery was scheduled for 11/02/2022 5am arrival and PAT on 10/31/2022 @ 830a and carotid to follow at 930a advised to hold fish oil 10days prior. Pt asked about 81 mg asa sent message to Radha.

## 2022-09-23 ENCOUNTER — HOSPITAL ENCOUNTER (EMERGENCY)
Facility: HOSPITAL | Age: 82
Discharge: HOME | End: 2022-09-23
Attending: EMERGENCY MEDICINE | Admitting: EMERGENCY MEDICINE

## 2022-09-23 ENCOUNTER — APPOINTMENT (OUTPATIENT)
Dept: GENERAL RADIOLOGY | Facility: HOSPITAL | Age: 82
End: 2022-09-23

## 2022-09-23 VITALS
WEIGHT: 180 LBS | RESPIRATION RATE: 16 BRPM | DIASTOLIC BLOOD PRESSURE: 59 MMHG | HEART RATE: 51 BPM | HEIGHT: 69 IN | BODY MASS INDEX: 26.66 KG/M2 | OXYGEN SATURATION: 95 % | TEMPERATURE: 98.1 F | SYSTOLIC BLOOD PRESSURE: 169 MMHG

## 2022-09-23 DIAGNOSIS — R07.89 ATYPICAL CHEST PAIN: Primary | ICD-10-CM

## 2022-09-23 LAB
ALBUMIN SERPL-MCNC: 3.9 G/DL (ref 3.5–5.2)
ALBUMIN/GLOB SERPL: 1.8 G/DL
ALP SERPL-CCNC: 81 U/L (ref 39–117)
ALT SERPL W P-5'-P-CCNC: 14 U/L (ref 1–41)
ANION GAP SERPL CALCULATED.3IONS-SCNC: 11.5 MMOL/L (ref 5–15)
AST SERPL-CCNC: 18 U/L (ref 1–40)
BASOPHILS # BLD AUTO: 0.03 10*3/MM3 (ref 0–0.2)
BASOPHILS NFR BLD AUTO: 0.5 % (ref 0–1.5)
BILIRUB SERPL-MCNC: 0.3 MG/DL (ref 0–1.2)
BUN SERPL-MCNC: 27 MG/DL (ref 8–23)
BUN/CREAT SERPL: 18.8 (ref 7–25)
CALCIUM SPEC-SCNC: 8.7 MG/DL (ref 8.6–10.5)
CHLORIDE SERPL-SCNC: 101 MMOL/L (ref 98–107)
CO2 SERPL-SCNC: 25.5 MMOL/L (ref 22–29)
CREAT SERPL-MCNC: 1.44 MG/DL (ref 0.76–1.27)
DEPRECATED RDW RBC AUTO: 45.2 FL (ref 37–54)
EGFRCR SERPLBLD CKD-EPI 2021: 49.1 ML/MIN/1.73
EOSINOPHIL # BLD AUTO: 0.1 10*3/MM3 (ref 0–0.4)
EOSINOPHIL NFR BLD AUTO: 1.8 % (ref 0.3–6.2)
ERYTHROCYTE [DISTWIDTH] IN BLOOD BY AUTOMATED COUNT: 14.9 % (ref 12.3–15.4)
GLOBULIN UR ELPH-MCNC: 2.2 GM/DL
GLUCOSE SERPL-MCNC: 108 MG/DL (ref 65–99)
HCT VFR BLD AUTO: 38.8 % (ref 37.5–51)
HGB BLD-MCNC: 12.7 G/DL (ref 13–17.7)
HOLD SPECIMEN: NORMAL
IMM GRANULOCYTES # BLD AUTO: 0.01 10*3/MM3 (ref 0–0.05)
IMM GRANULOCYTES NFR BLD AUTO: 0.2 % (ref 0–0.5)
LYMPHOCYTES # BLD AUTO: 0.78 10*3/MM3 (ref 0.7–3.1)
LYMPHOCYTES NFR BLD AUTO: 13.9 % (ref 19.6–45.3)
MCH RBC QN AUTO: 27.9 PG (ref 26.6–33)
MCHC RBC AUTO-ENTMCNC: 32.7 G/DL (ref 31.5–35.7)
MCV RBC AUTO: 85.1 FL (ref 79–97)
MONOCYTES # BLD AUTO: 0.51 10*3/MM3 (ref 0.1–0.9)
MONOCYTES NFR BLD AUTO: 9.1 % (ref 5–12)
NEUTROPHILS NFR BLD AUTO: 4.18 10*3/MM3 (ref 1.7–7)
NEUTROPHILS NFR BLD AUTO: 74.5 % (ref 42.7–76)
NRBC BLD AUTO-RTO: 0 /100 WBC (ref 0–0.2)
PLATELET # BLD AUTO: 155 10*3/MM3 (ref 140–450)
PMV BLD AUTO: 9.5 FL (ref 6–12)
POTASSIUM SERPL-SCNC: 4 MMOL/L (ref 3.5–5.2)
PROT SERPL-MCNC: 6.1 G/DL (ref 6–8.5)
RBC # BLD AUTO: 4.56 10*6/MM3 (ref 4.14–5.8)
SODIUM SERPL-SCNC: 138 MMOL/L (ref 136–145)
TROPONIN T SERPL-MCNC: <0.01 NG/ML (ref 0–0.03)
TROPONIN T SERPL-MCNC: <0.01 NG/ML (ref 0–0.03)
WBC NRBC COR # BLD: 5.61 10*3/MM3 (ref 3.4–10.8)
WHOLE BLOOD HOLD COAG: NORMAL
WHOLE BLOOD HOLD SPECIMEN: NORMAL

## 2022-09-23 PROCEDURE — 71045 X-RAY EXAM CHEST 1 VIEW: CPT

## 2022-09-23 PROCEDURE — 99284 EMERGENCY DEPT VISIT MOD MDM: CPT

## 2022-09-23 PROCEDURE — 84484 ASSAY OF TROPONIN QUANT: CPT | Performed by: PHYSICIAN ASSISTANT

## 2022-09-23 PROCEDURE — 93005 ELECTROCARDIOGRAM TRACING: CPT | Performed by: EMERGENCY MEDICINE

## 2022-09-23 PROCEDURE — 80053 COMPREHEN METABOLIC PANEL: CPT | Performed by: PHYSICIAN ASSISTANT

## 2022-09-23 PROCEDURE — 85025 COMPLETE CBC W/AUTO DIFF WBC: CPT | Performed by: PHYSICIAN ASSISTANT

## 2022-09-23 PROCEDURE — 93010 ELECTROCARDIOGRAM REPORT: CPT | Performed by: INTERNAL MEDICINE

## 2022-09-23 PROCEDURE — 36415 COLL VENOUS BLD VENIPUNCTURE: CPT

## 2022-09-23 PROCEDURE — 93005 ELECTROCARDIOGRAM TRACING: CPT

## 2022-09-23 RX ORDER — LIDOCAINE 50 MG/G
1 PATCH TOPICAL EVERY 24 HOURS
Qty: 6 EACH | Refills: 0 | Status: SHIPPED | OUTPATIENT
Start: 2022-09-23 | End: 2022-10-03

## 2022-09-23 RX ORDER — SODIUM CHLORIDE 0.9 % (FLUSH) 0.9 %
10 SYRINGE (ML) INJECTION AS NEEDED
Status: DISCONTINUED | OUTPATIENT
Start: 2022-09-23 | End: 2022-09-24 | Stop reason: HOSPADM

## 2022-09-23 NOTE — ED PROVIDER NOTES
EMERGENCY DEPARTMENT ENCOUNTER    Room Number:  01/01  Date of encounter:  9/23/2022  PCP: Ann Marie Brizuela MD  Historian: Patient  Full history not obtainable due to: None    HPI:  Chief Complaint: CP    Context: Imer Jordan is a 82 y.o. male with a PMH significant for aortic valve insufficiency, hypertension, acid reflux, aortic aneurysm, chronic kidney disease who presents to the ED c/o intermittent left-sided sharp and nonradiating chest pain that began yesterday afternoon.  He had an episode around noon while at rest that lasted only few seconds before resolving spontaneously.  He had recurrence approximately 1 hour later.  He started feeling similar symptoms again today with intermittent sharp pain and no associated symptoms.  No obvious provocative or alleviating factors.  He denies associated shortness of air, nausea, diaphoresis, leg swelling.  States he had a recent cardiac cath that was normal a few months ago.  He is scheduled for open heart surgery to have an aortic valve replacement in November.  He does report that the area of pain is exquisitely tender to palpation as well.      MEDICAL RECORD REVIEW:    Upon review of the medical record it appears the patient was evaluated in the office with cardiology on July 18, 2022 for aortic valve insufficiency      PAST MEDICAL HISTORY    Active Ambulatory Problems     Diagnosis Date Noted   • Aortic valve insufficiency 01/11/2016   • Benign essential HTN 01/11/2016   • Benign prostatic hypertrophy 01/11/2016   • ED (erectile dysfunction) 01/11/2016   • Acid reflux 01/11/2016   • HLD (hyperlipidemia) 01/11/2016   • Dermatitis seborrheica 01/11/2016   • Hyperglycemia 07/07/2016   • Left lumbar radiculopathy 09/13/2017   • History of AAA (abdominal aortic aneurysm) repair 03/27/2018   • Ascending aorta dilation (HCC) 07/16/2019   • Nonrheumatic aortic valve regurgitation 08/17/2009   • Stage 3a chronic kidney disease (HCC) 11/11/2021   • Iron deficiency  anemia 04/20/2022     Resolved Ambulatory Problems     Diagnosis Date Noted   • AA (aortic aneurysm) (Formerly [facility]) 01/11/2016   • Aortic aneurysm (Formerly [facility]) 08/17/2009   • Iliac aneurysm (Formerly [facility]) 02/12/2018   • MERRICK (acute kidney injury) (Formerly [facility]) 03/28/2018   • Mild renal insufficiency 04/13/2018   • Obesity, Class I, BMI 30-34.9 07/16/2019     Past Medical History:   Diagnosis Date   • AAA (abdominal aortic aneurysm) without rupture (Formerly [facility])    • Arthritis    • Diverticulitis, colon 09/2012   • Erectile dysfunction    • GERD (gastroesophageal reflux disease)    • Hypertension    • Thoracic compression fracture (Formerly [facility])          PAST SURGICAL HISTORY  Past Surgical History:   Procedure Laterality Date   • ABDOMINAL AORTIC ANEURYSM REPAIR  2018   • CARDIAC CATHETERIZATION Left 4/21/2022    Procedure: Coronary Angiography;  Surgeon: Magui Vega MD;  Location: [facility] CATH INVASIVE LOCATION;  Service: Cardiology;  Laterality: Left;   • CARDIAC CATHETERIZATION N/A 4/21/2022    Procedure: Left Heart Cath;  Surgeon: Magui Vega MD;  Location: [facility] CATH INVASIVE LOCATION;  Service: Cardiology;  Laterality: N/A;   • COLONOSCOPY  2013    negative per pt   • COLONOSCOPY N/A 5/9/2022    Procedure: COLONOSCOPY to terminal ileum with APC;  Surgeon: Gato Mccarthy MD;  Location: [facility] ENDOSCOPY;  Service: Gastroenterology;  Laterality: N/A;  pre - anemia  post - diverticulosis, hemorrhoids, cecal avm,    • ENDOSCOPY N/A 5/9/2022    Procedure: ESOPHAGOGASTRODUODENOSCOPY;  Surgeon: Gato Mccarthy MD;  Location: [facility] ENDOSCOPY;  Service: Gastroenterology;  Laterality: N/A;  pre - anemia  post - hiatal hernia, schatzki ring   • RENAL ARTERY ENDARTERECTOMY Bilateral 04/02/2018    Done at [facility] along with aortic patch         FAMILY HISTORY  Family History   Problem Relation Age of Onset   • Thyroid cancer Mother    • Cancer Father    • Stroke Father    • Arthritis Sister    • Arthritis Sister          SOCIAL HISTORY  Social History      Socioeconomic History   • Marital status:    Tobacco Use   • Smoking status: Passive Smoke Exposure - Never Smoker   • Smokeless tobacco: Never Used   • Tobacco comment: occ   Vaping Use   • Vaping Use: Never used   Substance and Sexual Activity   • Alcohol use: No   • Drug use: No   • Sexual activity: Not Currently     Partners: Female     Birth control/protection: None         ALLERGIES  Niacin and Spironolactone        REVIEW OF SYSTEMS    All systems reviewed and marked as negative except as listed in HPI     PHYSICAL EXAM    I have reviewed the triage vital signs and nursing notes.    ED Triage Vitals   Temp Heart Rate Resp BP SpO2   09/23/22 1920 09/23/22 1920 09/23/22 1920 09/23/22 1922 09/23/22 1920   98.4 °F (36.9 °C) 64 16 128/75 98 %      Temp src Heart Rate Source Patient Position BP Location FiO2 (%)   09/23/22 1920 -- -- -- --   Oral           Physical Exam  Constitutional:       General: He is not in acute distress.     Appearance: He is well-developed.   HENT:      Head: Normocephalic and atraumatic.   Eyes:      General: No scleral icterus.     Conjunctiva/sclera: Conjunctivae normal.   Neck:      Trachea: No tracheal deviation.   Cardiovascular:      Rate and Rhythm: Normal rate and regular rhythm.   Pulmonary:      Effort: Pulmonary effort is normal.      Breath sounds: Normal breath sounds.   Chest:       Abdominal:      Palpations: Abdomen is soft.      Tenderness: There is no abdominal tenderness. There is no guarding.   Musculoskeletal:         General: No deformity.      Cervical back: Normal range of motion.   Lymphadenopathy:      Cervical: No cervical adenopathy.   Skin:     General: Skin is warm and dry.   Neurological:      Mental Status: He is alert and oriented to person, place, and time.   Psychiatric:         Behavior: Behavior normal.         Vital signs and nursing notes reviewed.            LAB RESULTS  Recent Results (from the past 24 hour(s))   ECG 12 Lead     Collection Time: 09/23/22  7:15 PM   Result Value Ref Range    QT Interval 442 ms   Comprehensive Metabolic Panel    Collection Time: 09/23/22  7:50 PM    Specimen: Blood   Result Value Ref Range    Glucose 108 (H) 65 - 99 mg/dL    BUN 27 (H) 8 - 23 mg/dL    Creatinine 1.44 (H) 0.76 - 1.27 mg/dL    Sodium 138 136 - 145 mmol/L    Potassium 4.0 3.5 - 5.2 mmol/L    Chloride 101 98 - 107 mmol/L    CO2 25.5 22.0 - 29.0 mmol/L    Calcium 8.7 8.6 - 10.5 mg/dL    Total Protein 6.1 6.0 - 8.5 g/dL    Albumin 3.90 3.50 - 5.20 g/dL    ALT (SGPT) 14 1 - 41 U/L    AST (SGOT) 18 1 - 40 U/L    Alkaline Phosphatase 81 39 - 117 U/L    Total Bilirubin 0.3 0.0 - 1.2 mg/dL    Globulin 2.2 gm/dL    A/G Ratio 1.8 g/dL    BUN/Creatinine Ratio 18.8 7.0 - 25.0    Anion Gap 11.5 5.0 - 15.0 mmol/L    eGFR 49.1 (L) >60.0 mL/min/1.73   Troponin    Collection Time: 09/23/22  7:50 PM    Specimen: Blood   Result Value Ref Range    Troponin T <0.010 0.000 - 0.030 ng/mL   Green Top (Gel)    Collection Time: 09/23/22  7:50 PM   Result Value Ref Range    Extra Tube Hold for add-ons.    Lavender Top    Collection Time: 09/23/22  7:50 PM   Result Value Ref Range    Extra Tube hold for add-on    Light Blue Top    Collection Time: 09/23/22  7:50 PM   Result Value Ref Range    Extra Tube Hold for add-ons.    CBC Auto Differential    Collection Time: 09/23/22  7:50 PM    Specimen: Blood   Result Value Ref Range    WBC 5.61 3.40 - 10.80 10*3/mm3    RBC 4.56 4.14 - 5.80 10*6/mm3    Hemoglobin 12.7 (L) 13.0 - 17.7 g/dL    Hematocrit 38.8 37.5 - 51.0 %    MCV 85.1 79.0 - 97.0 fL    MCH 27.9 26.6 - 33.0 pg    MCHC 32.7 31.5 - 35.7 g/dL    RDW 14.9 12.3 - 15.4 %    RDW-SD 45.2 37.0 - 54.0 fl    MPV 9.5 6.0 - 12.0 fL    Platelets 155 140 - 450 10*3/mm3    Neutrophil % 74.5 42.7 - 76.0 %    Lymphocyte % 13.9 (L) 19.6 - 45.3 %    Monocyte % 9.1 5.0 - 12.0 %    Eosinophil % 1.8 0.3 - 6.2 %    Basophil % 0.5 0.0 - 1.5 %    Immature Grans % 0.2 0.0 - 0.5 %     Neutrophils, Absolute 4.18 1.70 - 7.00 10*3/mm3    Lymphocytes, Absolute 0.78 0.70 - 3.10 10*3/mm3    Monocytes, Absolute 0.51 0.10 - 0.90 10*3/mm3    Eosinophils, Absolute 0.10 0.00 - 0.40 10*3/mm3    Basophils, Absolute 0.03 0.00 - 0.20 10*3/mm3    Immature Grans, Absolute 0.01 0.00 - 0.05 10*3/mm3    nRBC 0.0 0.0 - 0.2 /100 WBC       Ordered the above labs and independently reviewed the results.        RADIOLOGY  XR Chest 1 View    Result Date: 9/23/2022  PORTABLE CHEST  HISTORY:  Chest pain.  COMPARISON: None.  A single view of the chest demonstrates the heart to be within normal limits in size. There is no evidence of infiltrate, effusion or congestive failure.        I ordered the above noted radiological studies. Independently reviewed by me and discussed with radiologist.  See dictation above for official radiology interpretation.      PROCEDURES    Procedures        MEDICATIONS GIVEN IN ER    Medications   sodium chloride 0.9 % flush 10 mL (has no administration in time range)         PROGRESS, DATA ANALYSIS, CONSULTS, AND MEDICAL DECISION MAKING    All labs have been independently reviewed by me.  All radiology studies have been reviewed by me.   EKG's independently reviewed by me.  Discussion below represents my analysis of pertinent findings related to patient's condition, differential diagnosis, treatment plan and final disposition.    DIFFERENTIAL DIAGNOSIS INCLUDE BUT NOT LIMITED TO:     Differential diagnosis includes but is not limited to:  -acute coronary syndrome  -pulmonary embolism  -thoracic aortic dissection  -pneumonia  -pneumothorax  -musculoskeletal pain  -GERD  -esophageal spasm  -anxiety  -myocarditis/pericarditis  -esophageal rupture  -pancreatitis.         ED Course as of 09/23/22 2156   Fri Sep 23, 2022   2033 I viewed CXR on PACS system.  My interpretation is no pneumothorax. [DC]      ED Course User Index  [DC] Radu Howard PA       AS OF 21:56 EDT VITALS:    BP - 147/67  HR -  53  TEMP - 98.4 °F (36.9 °C) (Oral)  02 SATS - 96%    2156 I rechecked the patient.  I discussed the patient's radiology findings (including all incidental findings), diagnosis, and plan for discharge.  A repeat exam reveals no new worrisome changes from my initial exam findings.  The patient understands that the fact that they are being discharged does not denote that nothing is abnormal, it indicates that no clinical emergency is present and that they must follow-up as directed in order to properly maintain their health.  Follow-up instructions (specifically listed below) and return to ER precautions were given at this time.  I specifically instructed the patient to follow-up with their PCP.  The patient understands and agrees with the plan, and is ready for discharge.  All questions answered.        DIAGNOSIS  Final diagnoses:   Atypical chest pain         DISPOSITION  D/c    Pt masked in first look. I wore a surgical mask throughout my encounters with the pt. I performed hand hygiene on entry into the pt room and upon exit.     Dictated utilizing Dragon dictation     Note Disclaimer: At [facility], we believe that sharing information builds trust and better relationships. You are receiving this note because you recently visited [facility]. It is possible you will see health information before a provider has talked with you about it. This kind of information can be easy to misunderstand. To help you fully understand what it means for your health, we urge you to discuss this note with your provider.      Radu Howard PA  09/23/22 1149

## 2022-09-23 NOTE — ED TRIAGE NOTES
Pt arrived ambulatory to [facility] c/o chest pain that started approx 24 hours ago. Pt states its an intermittent chest pain that feels like a sharp stab. Pt states he is scheduled for open heart surgery in November.

## 2022-09-24 LAB — QT INTERVAL: 442 MS

## 2022-10-03 ENCOUNTER — OFFICE VISIT (OUTPATIENT)
Dept: CARDIOLOGY | Facility: CLINIC | Age: 82
End: 2022-10-03

## 2022-10-03 VITALS
HEIGHT: 69 IN | BODY MASS INDEX: 26.81 KG/M2 | WEIGHT: 181 LBS | HEART RATE: 64 BPM | DIASTOLIC BLOOD PRESSURE: 52 MMHG | SYSTOLIC BLOOD PRESSURE: 120 MMHG

## 2022-10-03 DIAGNOSIS — I35.1 NONRHEUMATIC AORTIC VALVE INSUFFICIENCY: ICD-10-CM

## 2022-10-03 DIAGNOSIS — I25.10 CORONARY ARTERY DISEASE INVOLVING NATIVE CORONARY ARTERY OF NATIVE HEART WITHOUT ANGINA PECTORIS: Primary | ICD-10-CM

## 2022-10-03 PROCEDURE — 99214 OFFICE O/P EST MOD 30 MIN: CPT | Performed by: INTERNAL MEDICINE

## 2022-10-03 RX ORDER — KETOCONAZOLE 20 MG/G
1 CREAM TOPICAL DAILY PRN
COMMUNITY

## 2022-10-03 NOTE — PROGRESS NOTES
Subjective:     Encounter Date:10/03/22      Patient ID: Imer Jordan is a 82 y.o. male.    Chief Complaint: Establish care, AI, PAD, AAA, ascending aneurysm  HPI:   This is a 81-year-old man who presents for evaluation.  He has an extensive cardiovascular history.  He has known severe aortic regurgitation by LIN in 2019.  In 2018 he underwent AAA repair with juxtarenal aneurysm repair with a Hemashield gold graft with end and replacement of both hypogastric arteries after hypogastric artery endarterectomy.  Proximal aortic endarterectomy attaching the external iliac artery end-to-side into each limb of the aortobiiliac graft endarterectomy of the SUSSY with reimplantation of Carrel patch, endarterectomy of the left accessory renal artery.  Left and right cardiac catheterization in 2019 showed no evidence of obstructive coronary disease.    All of his above work-up was performed at the clinic clinic.  When he saw the cardiologist in 2019 they scheduled a follow-up stress echo but also noted the need for aortic valve replacement.  I met him in April 2022. He complained of dyspnea and fatigue. He was anemic and iron saturation was 5%. I performed a cardiac cath showing severe ostial diagonal CAD. He has seen Dr. Louie and will be getting AVR with possible aortic root repair in November, his original surgical date  In August was canceled as his wife was in the hospital with sepsis.     He was seen in the ER in September 2022 with atypical chest pain, experienced as reproducible pain in the left rib. It resolved spontaneously. His EKG at that time showed lateral TWI. Troponins were negative and he was released from the ER.     The very pleasant man who is .  He has a good understanding of his medical conditions.  He occasionally smokes cigars.  He does not drink alcohol.  He has a history of CKD stage III and sees Jason daily.  He has not seen a vascular surgeon in town.    The following portions of the  patient's history were reviewed and updated as appropriate: allergies, current medications, past family history, past medical history, past social history, past surgical history and problem list.     REVIEW OF SYSTEMS:   All systems reviewed.  Pertinent positives identified in HPI.  All other systems are negative.    Past Medical History:   Diagnosis Date   • AAA (abdominal aortic aneurysm) without rupture     stated in 11- [facility] notes   • Arthritis    • Ascending aorta dilation (HCC)     stated in 11- [facility] notes   • Diverticulitis, colon 09/2012   • Erectile dysfunction    • GERD (gastroesophageal reflux disease)    • Hypertension    • Mild renal insufficiency 04/13/2018    OVERVIEW:  Noted 4/13/2018 at [facility] during AAA surgery   • Nonrheumatic aortic valve regurgitation     stated in 11- [facility] notes   • Thoracic compression fracture (HCC)        Family History   Problem Relation Age of Onset   • Thyroid cancer Mother    • Cancer Father    • Stroke Father    • Arthritis Sister    • Arthritis Sister        Social History     Socioeconomic History   • Marital status:    Tobacco Use   • Smoking status: Passive Smoke Exposure - Never Smoker   • Smokeless tobacco: Never Used   • Tobacco comment: occ   Vaping Use   • Vaping Use: Never used   Substance and Sexual Activity   • Alcohol use: No   • Drug use: No   • Sexual activity: Not Currently     Partners: Female     Birth control/protection: None       Allergies   Allergen Reactions   • Niacin Dizziness   • Spironolactone Nausea Only     Other reaction(s): Headache       Past Surgical History:   Procedure Laterality Date   • ABDOMINAL AORTIC ANEURYSM REPAIR  2018   • CARDIAC CATHETERIZATION Left 4/21/2022    Procedure: Coronary Angiography;  Surgeon: Magui Vega MD;  Location: [facility] INVASIVE LOCATION;  Service: Cardiology;  Laterality: Left;   • CARDIAC CATHETERIZATION N/A 4/21/2022     Procedure: Left Heart Cath;  Surgeon: Magui Vega MD;  Location: [facility] CATH INVASIVE LOCATION;  Service: Cardiology;  Laterality: N/A;   • COLONOSCOPY  2013    negative per pt   • COLONOSCOPY N/A 5/9/2022    Procedure: COLONOSCOPY to terminal ileum with APC;  Surgeon: Gato Mccarthy MD;  Location: [facility] ENDOSCOPY;  Service: Gastroenterology;  Laterality: N/A;  pre - anemia  post - diverticulosis, hemorrhoids, cecal avm,    • ENDOSCOPY N/A 5/9/2022    Procedure: ESOPHAGOGASTRODUODENOSCOPY;  Surgeon: Gato Mccarthy MD;  Location: [facility] ENDOSCOPY;  Service: Gastroenterology;  Laterality: N/A;  pre - anemia  post - hiatal hernia, schatzki ring   • RENAL ARTERY ENDARTERECTOMY Bilateral 04/02/2018    Done at [facility] along with aortic patch       Procedures     Objective:         PHYSICAL EXAM:  GEN: VSS, no distress,   Eyes: normal sclera, normal lids and lashes  HENT: moist mucus membranes,   Respiratory: CTAB, no rales or wheezes  CV: RRR, loud blowing 4 out of 6 aortic insufficiency murmur throughout the precordium radiating into the abdomen and carotids, +2 DP and 2+ carotid pulses b/l  GI: NABS, soft,  Nontender, nondistended  MSK: no edema, no scoliosis or kyphosis  Skin: no rash, warm, dry  Heme/Lymph: no bruising or bleeding  Psych: organized thought, normal behavior and affect  Neuro: Cranial nerves grossly intact, Alert and Oriented x 3.         Assessment:          Diagnosis Plan   1. Coronary artery disease involving native coronary artery of native heart without angina pectoris     2. Nonrheumatic aortic valve insufficiency            Plan:         1.  Severe AI: Plan for AVR with aortic root repair/replacement in November 2022.   2.  Ascending aortic aneurysm: Last measured 5.1cm 2022 on chest CTA.   3.  Peripheral arterial disease: Extensive AAA repair with iliac end-to-end anastomosis. Repeat imaging in 2022 shows no significant lower extremity disease   4.  CAD: ostial diagonal disease,  otherwise nonobstructive disease  5.  CKD stage III: Sees Jason Gómez.  I have contacted him to discuss the CT angiogram prehydration  6.  Chest pain: seen in ER in September, EKG at that time with lateral TWI which may correlate to known diagonal disease however pain was atypical in that it was rib pain reproductible on palpation. Pain has resolved spontaneously without recurrence. Monitor. Consider single vessel bypass at the time of surgery?     Dr. Brizuela and Dr. Louie, thank you very much for referring this kind patient to me. Please call me with any questions or concerns. I will see the patient again in the hospital at the time of surgery.       Magui Vega MD  10/03/22  [facility]    Outpatient Encounter Medications as of 10/3/2022   Medication Sig Dispense Refill   • amLODIPine-benazepril (LOTREL 5-20) 5-20 MG per capsule TAKE 1 CAPSULE EVERY DAY 90 capsule 1   • aspirin 81 MG chewable tablet Chew.     • betamethasone dipropionate (DIPROLENE) 0.05 % lotion APPLY  TOPICALLY TO THE APPROPRIATE AREA AS DIRECTED TWICE DAILY 60 mL 2   • chlorthalidone (HYGROTON) 25 MG tablet Take 1 tablet by mouth Daily. 90 tablet 1   • clobetasol (TEMOVATE) 0.05 % cream      • ferrous gluconate (FERGON) 324 MG tablet Take 324 mg by mouth.     • finasteride (PROSCAR) 5 MG tablet TAKE 1 TABLET EVERY DAY 90 tablet 1   • Glucos-MSM-C-Oh-Kcviko-Qudsxl (GLUCOSAMINE MSM COMPLEX) tablet Take by mouth.     • ketoconazole (NIZORAL) 2 % cream Apply 1 application topically to the appropriate area as directed Daily.     • mometasone (Elocon) 0.1 % cream Apply  topically to the appropriate area as directed Daily. 15 g 0   • Olive Leaf Extract 250 MG capsule Take 1 capsule by mouth Daily.     • Omega-3 Krill Oil 1000 MG capsule Take by mouth.     • pantoprazole (PROTONIX) 40 MG EC tablet Take 1 tablet by mouth Daily. 90 tablet 3   • [DISCONTINUED] lidocaine (LIDODERM) 5 % Place 1 patch on the skin as directed by provider  Daily. Remove & Discard patch within 12 hours or as directed by MD 6 each 0     No facility-administered encounter medications on file as of 10/3/2022.

## 2022-10-31 ENCOUNTER — HOSPITAL ENCOUNTER (OUTPATIENT)
Dept: GENERAL RADIOLOGY | Facility: HOSPITAL | Age: 82
Discharge: HOME | End: 2022-10-31

## 2022-10-31 ENCOUNTER — PRE-ADMISSION TESTING (OUTPATIENT)
Dept: PREADMISSION TESTING | Facility: HOSPITAL | Age: 82
End: 2022-10-31

## 2022-10-31 ENCOUNTER — HOSPITAL ENCOUNTER (OUTPATIENT)
Dept: CARDIOLOGY | Facility: HOSPITAL | Age: 82
Discharge: HOME | End: 2022-10-31

## 2022-10-31 ENCOUNTER — ANESTHESIA EVENT (OUTPATIENT)
Dept: PERIOP | Facility: HOSPITAL | Age: 82
End: 2022-10-31

## 2022-10-31 VITALS
RESPIRATION RATE: 18 BRPM | WEIGHT: 179.8 LBS | TEMPERATURE: 97.8 F | SYSTOLIC BLOOD PRESSURE: 170 MMHG | OXYGEN SATURATION: 98 % | DIASTOLIC BLOOD PRESSURE: 63 MMHG | HEART RATE: 71 BPM | HEIGHT: 69 IN | BODY MASS INDEX: 26.63 KG/M2

## 2022-10-31 DIAGNOSIS — I77.810 ASCENDING AORTA DILATION: ICD-10-CM

## 2022-10-31 DIAGNOSIS — R79.1 ABNORMAL COAGULATION PROFILE: ICD-10-CM

## 2022-10-31 DIAGNOSIS — I79.8 OTHER DISORDERS OF ARTERIES, ARTERIOLES AND CAPILLARIES IN DISEASES CLASSIFIED ELSEWHERE: ICD-10-CM

## 2022-10-31 DIAGNOSIS — I35.1 NONRHEUMATIC AORTIC VALVE INSUFFICIENCY: ICD-10-CM

## 2022-10-31 DIAGNOSIS — I50.9 HEART FAILURE, UNSPECIFIED HF CHRONICITY, UNSPECIFIED HEART FAILURE TYPE: ICD-10-CM

## 2022-10-31 DIAGNOSIS — R93.3 ABNORMAL FINDINGS ON DIAGNOSTIC IMAGING OF OTHER PARTS OF DIGESTIVE TRACT: ICD-10-CM

## 2022-10-31 DIAGNOSIS — R79.9 ABNORMAL FINDING OF BLOOD CHEMISTRY, UNSPECIFIED: ICD-10-CM

## 2022-10-31 LAB
ABO GROUP BLD: NORMAL
ALBUMIN SERPL-MCNC: 3.8 G/DL (ref 3.5–5.2)
ALBUMIN/GLOB SERPL: 1.7 G/DL
ALP SERPL-CCNC: 84 U/L (ref 39–117)
ALT SERPL W P-5'-P-CCNC: 14 U/L (ref 1–41)
ANION GAP SERPL CALCULATED.3IONS-SCNC: 7.6 MMOL/L (ref 5–15)
APTT PPP: 29.8 SECONDS (ref 22.7–35.4)
ARTERIAL PATENCY WRIST A: POSITIVE
AST SERPL-CCNC: 17 U/L (ref 1–40)
ATMOSPHERIC PRESS: 750.9 MMHG
BASE EXCESS BLDA CALC-SCNC: -0.4 MMOL/L (ref 0–2)
BASOPHILS # BLD AUTO: 0.03 10*3/MM3 (ref 0–0.2)
BASOPHILS NFR BLD AUTO: 0.5 % (ref 0–1.5)
BDY SITE: ABNORMAL
BH CV XLRA MEAS LEFT DIST CCA EDV: -8.8 CM/SEC
BH CV XLRA MEAS LEFT DIST CCA PSV: -103 CM/SEC
BH CV XLRA MEAS LEFT DIST ICA EDV: -17.3 CM/SEC
BH CV XLRA MEAS LEFT DIST ICA PSV: -87.2 CM/SEC
BH CV XLRA MEAS LEFT ICA/CCA RATIO: 1.39
BH CV XLRA MEAS LEFT MID ICA EDV: -13.4 CM/SEC
BH CV XLRA MEAS LEFT MID ICA PSV: -119 CM/SEC
BH CV XLRA MEAS LEFT PROX CCA PSV: 10 CM/SEC
BH CV XLRA MEAS LEFT PROX ECA EDV: -11.8 CM/SEC
BH CV XLRA MEAS LEFT PROX ECA PSV: -119 CM/SEC
BH CV XLRA MEAS LEFT PROX ICA EDV: -18.9 CM/SEC
BH CV XLRA MEAS LEFT PROX ICA PSV: -143 CM/SEC
BH CV XLRA MEAS LEFT PROX SCLA PSV: 216 CM/SEC
BH CV XLRA MEAS LEFT VERTEBRAL A EDV: 7.9 CM/SEC
BH CV XLRA MEAS LEFT VERTEBRAL A PSV: 55 CM/SEC
BH CV XLRA MEAS RIGHT DIST CCA EDV: -11.7 CM/SEC
BH CV XLRA MEAS RIGHT DIST CCA PSV: -85 CM/SEC
BH CV XLRA MEAS RIGHT DIST ICA EDV: 18.1 CM/SEC
BH CV XLRA MEAS RIGHT DIST ICA PSV: 126 CM/SEC
BH CV XLRA MEAS RIGHT ICA/CCA RATIO: 1.65
BH CV XLRA MEAS RIGHT MID ICA EDV: -18.9 CM/SEC
BH CV XLRA MEAS RIGHT MID ICA PSV: -140 CM/SEC
BH CV XLRA MEAS RIGHT PROX CCA EDV: 18.7 CM/SEC
BH CV XLRA MEAS RIGHT PROX CCA PSV: 89.4 CM/SEC
BH CV XLRA MEAS RIGHT PROX ECA EDV: -6.4 CM/SEC
BH CV XLRA MEAS RIGHT PROX ECA PSV: -106 CM/SEC
BH CV XLRA MEAS RIGHT PROX ICA EDV: -19.9 CM/SEC
BH CV XLRA MEAS RIGHT PROX ICA PSV: -107 CM/SEC
BH CV XLRA MEAS RIGHT PROX SCLA PSV: 222 CM/SEC
BH CV XLRA MEAS RIGHT VERTEBRAL A EDV: -17.3 CM/SEC
BH CV XLRA MEAS RIGHT VERTEBRAL A PSV: -67.6 CM/SEC
BILIRUB SERPL-MCNC: 0.3 MG/DL (ref 0–1.2)
BILIRUB UR QL STRIP: NEGATIVE
BLD GP AB SCN SERPL QL: NEGATIVE
BUN SERPL-MCNC: 29 MG/DL (ref 8–23)
BUN/CREAT SERPL: 16.6 (ref 7–25)
CALCIUM SPEC-SCNC: 8.8 MG/DL (ref 8.6–10.5)
CHLORIDE SERPL-SCNC: 104 MMOL/L (ref 98–107)
CHOLEST SERPL-MCNC: 164 MG/DL (ref 0–200)
CLARITY UR: CLEAR
CLOSE TME COLL+ADP + EPINEP PNL BLD: 96 % (ref 86–100)
CO2 SERPL-SCNC: 28.4 MMOL/L (ref 22–29)
COLOR UR: YELLOW
CREAT SERPL-MCNC: 1.75 MG/DL (ref 0.76–1.27)
DEPRECATED RDW RBC AUTO: 39.8 FL (ref 37–54)
EGFRCR SERPLBLD CKD-EPI 2021: 38.9 ML/MIN/1.73
EOSINOPHIL # BLD AUTO: 0.11 10*3/MM3 (ref 0–0.4)
EOSINOPHIL NFR BLD AUTO: 1.9 % (ref 0.3–6.2)
ERYTHROCYTE [DISTWIDTH] IN BLOOD BY AUTOMATED COUNT: 13 % (ref 12.3–15.4)
GLOBULIN UR ELPH-MCNC: 2.3 GM/DL
GLUCOSE SERPL-MCNC: 104 MG/DL (ref 65–99)
GLUCOSE UR STRIP-MCNC: NEGATIVE MG/DL
HBA1C MFR BLD: 6.1 % (ref 4.8–5.6)
HCO3 BLDA-SCNC: 24.4 MMOL/L (ref 22–28)
HCT VFR BLD AUTO: 40.6 % (ref 37.5–51)
HDLC SERPL-MCNC: 38 MG/DL (ref 40–60)
HGB BLD-MCNC: 13.3 G/DL (ref 13–17.7)
HGB UR QL STRIP.AUTO: NEGATIVE
IMM GRANULOCYTES # BLD AUTO: 0.03 10*3/MM3 (ref 0–0.05)
IMM GRANULOCYTES NFR BLD AUTO: 0.5 % (ref 0–0.5)
INR PPP: 1 (ref 0.9–1.1)
KETONES UR QL STRIP: NEGATIVE
LDLC SERPL CALC-MCNC: 111 MG/DL (ref 0–100)
LDLC/HDLC SERPL: 2.91 {RATIO}
LEFT ARM BP: NORMAL MMHG
LEUKOCYTE ESTERASE UR QL STRIP.AUTO: NEGATIVE
LYMPHOCYTES # BLD AUTO: 0.97 10*3/MM3 (ref 0.7–3.1)
LYMPHOCYTES NFR BLD AUTO: 16.5 % (ref 19.6–45.3)
MAGNESIUM SERPL-MCNC: 2 MG/DL (ref 1.6–2.4)
MAXIMAL PREDICTED HEART RATE: 140 BPM
MCH RBC QN AUTO: 28.4 PG (ref 26.6–33)
MCHC RBC AUTO-ENTMCNC: 32.8 G/DL (ref 31.5–35.7)
MCV RBC AUTO: 86.6 FL (ref 79–97)
MODALITY: ABNORMAL
MONOCYTES # BLD AUTO: 0.54 10*3/MM3 (ref 0.1–0.9)
MONOCYTES NFR BLD AUTO: 9.2 % (ref 5–12)
NEUTROPHILS NFR BLD AUTO: 4.2 10*3/MM3 (ref 1.7–7)
NEUTROPHILS NFR BLD AUTO: 71.4 % (ref 42.7–76)
NITRITE UR QL STRIP: NEGATIVE
NRBC BLD AUTO-RTO: 0 /100 WBC (ref 0–0.2)
NT-PROBNP SERPL-MCNC: 635 PG/ML (ref 0–1800)
PCO2 BLDA: 39.6 MM HG (ref 35–45)
PH BLDA: 7.4 PH UNITS (ref 7.35–7.45)
PH UR STRIP.AUTO: 5.5 [PH] (ref 5–8)
PLATELET # BLD AUTO: 164 10*3/MM3 (ref 140–450)
PMV BLD AUTO: 10.1 FL (ref 6–12)
PO2 BLDA: 83.6 MM HG (ref 80–100)
POTASSIUM SERPL-SCNC: 4.3 MMOL/L (ref 3.5–5.2)
PROT SERPL-MCNC: 6.1 G/DL (ref 6–8.5)
PROT UR QL STRIP: NEGATIVE
PROTHROMBIN TIME: 13.3 SECONDS (ref 11.7–14.2)
QT INTERVAL: 476 MS
RBC # BLD AUTO: 4.69 10*6/MM3 (ref 4.14–5.8)
RH BLD: POSITIVE
RIGHT ARM BP: NORMAL MMHG
SAO2 % BLDCOA: 96.2 % (ref 92–99)
SARS-COV-2 ORF1AB RESP QL NAA+PROBE: NOT DETECTED
SODIUM SERPL-SCNC: 140 MMOL/L (ref 136–145)
SP GR UR STRIP: 1.02 (ref 1–1.03)
STRESS TARGET HR: 119 BPM
T&S EXPIRATION DATE: NORMAL
TOTAL RATE: 16 BREATHS/MINUTE
TRIGL SERPL-MCNC: 77 MG/DL (ref 0–150)
UROBILINOGEN UR QL STRIP: NORMAL
VLDLC SERPL-MCNC: 15 MG/DL (ref 5–40)
WBC NRBC COR # BLD: 5.88 10*3/MM3 (ref 3.4–10.8)

## 2022-10-31 PROCEDURE — 93880 EXTRACRANIAL BILAT STUDY: CPT

## 2022-10-31 PROCEDURE — 36600 WITHDRAWAL OF ARTERIAL BLOOD: CPT | Performed by: FAMILY MEDICINE

## 2022-10-31 PROCEDURE — 83036 HEMOGLOBIN GLYCOSYLATED A1C: CPT

## 2022-10-31 PROCEDURE — S0260 H&P FOR SURGERY: HCPCS | Performed by: NURSE PRACTITIONER

## 2022-10-31 PROCEDURE — 82803 BLOOD GASES ANY COMBINATION: CPT | Performed by: FAMILY MEDICINE

## 2022-10-31 PROCEDURE — 83880 ASSAY OF NATRIURETIC PEPTIDE: CPT

## 2022-10-31 PROCEDURE — 86920 COMPATIBILITY TEST SPIN: CPT

## 2022-10-31 PROCEDURE — 93005 ELECTROCARDIOGRAM TRACING: CPT

## 2022-10-31 PROCEDURE — 85730 THROMBOPLASTIN TIME PARTIAL: CPT

## 2022-10-31 PROCEDURE — 71046 X-RAY EXAM CHEST 2 VIEWS: CPT

## 2022-10-31 PROCEDURE — 85576 BLOOD PLATELET AGGREGATION: CPT

## 2022-10-31 PROCEDURE — 93010 ELECTROCARDIOGRAM REPORT: CPT | Performed by: INTERNAL MEDICINE

## 2022-10-31 PROCEDURE — 85610 PROTHROMBIN TIME: CPT

## 2022-10-31 PROCEDURE — 86900 BLOOD TYPING SEROLOGIC ABO: CPT

## 2022-10-31 PROCEDURE — 81003 URINALYSIS AUTO W/O SCOPE: CPT

## 2022-10-31 PROCEDURE — C9803 HOPD COVID-19 SPEC COLLECT: HCPCS

## 2022-10-31 PROCEDURE — 80053 COMPREHEN METABOLIC PANEL: CPT

## 2022-10-31 PROCEDURE — 85025 COMPLETE CBC W/AUTO DIFF WBC: CPT

## 2022-10-31 PROCEDURE — 86850 RBC ANTIBODY SCREEN: CPT

## 2022-10-31 PROCEDURE — 80061 LIPID PANEL: CPT

## 2022-10-31 PROCEDURE — 86901 BLOOD TYPING SEROLOGIC RH(D): CPT

## 2022-10-31 PROCEDURE — 83735 ASSAY OF MAGNESIUM: CPT

## 2022-10-31 PROCEDURE — S0260 H&P FOR SURGERY: HCPCS | Performed by: THORACIC SURGERY (CARDIOTHORACIC VASCULAR SURGERY)

## 2022-10-31 PROCEDURE — 36415 COLL VENOUS BLD VENIPUNCTURE: CPT

## 2022-10-31 PROCEDURE — U0004 COV-19 TEST NON-CDC HGH THRU: HCPCS

## 2022-10-31 RX ORDER — CHLORHEXIDINE GLUCONATE 0.12 MG/ML
15 RINSE ORAL EVERY 12 HOURS
Status: DISPENSED | OUTPATIENT
Start: 2022-10-31 | End: 2022-11-01

## 2022-10-31 RX ORDER — CHLORHEXIDINE GLUCONATE 0.12 MG/ML
15 RINSE ORAL TAKE AS DIRECTED
COMMUNITY
End: 2022-11-08 | Stop reason: HOSPADM

## 2022-10-31 RX ORDER — ASPIRIN 81 MG/1
81 TABLET ORAL DAILY
COMMUNITY

## 2022-10-31 RX ORDER — CHLORHEXIDINE GLUCONATE 500 MG/1
1 CLOTH TOPICAL EVERY 12 HOURS PRN
Status: DISCONTINUED | OUTPATIENT
Start: 2022-10-31 | End: 2022-12-16

## 2022-10-31 RX ORDER — CHLORHEXIDINE GLUCONATE 500 MG/1
1 CLOTH TOPICAL TAKE AS DIRECTED
COMMUNITY
End: 2022-11-08 | Stop reason: HOSPADM

## 2022-11-02 ENCOUNTER — ANCILLARY PROCEDURE (OUTPATIENT)
Dept: PERIOP | Facility: HOSPITAL | Age: 82
End: 2022-11-02

## 2022-11-02 ENCOUNTER — APPOINTMENT (OUTPATIENT)
Dept: GENERAL RADIOLOGY | Facility: HOSPITAL | Age: 82
End: 2022-11-02

## 2022-11-02 ENCOUNTER — ANESTHESIA (OUTPATIENT)
Dept: PERIOP | Facility: HOSPITAL | Age: 82
End: 2022-11-02

## 2022-11-02 ENCOUNTER — HOSPITAL ENCOUNTER (INPATIENT)
Facility: HOSPITAL | Age: 82
LOS: 6 days | Discharge: HOME HEALTH | End: 2022-11-08
Attending: THORACIC SURGERY (CARDIOTHORACIC VASCULAR SURGERY) | Admitting: THORACIC SURGERY (CARDIOTHORACIC VASCULAR SURGERY)

## 2022-11-02 DIAGNOSIS — R93.3 ABNORMAL ESOPHAGRAM: ICD-10-CM

## 2022-11-02 DIAGNOSIS — I77.810 ASCENDING AORTA DILATION: ICD-10-CM

## 2022-11-02 DIAGNOSIS — Z95.2 S/P AVR (AORTIC VALVE REPLACEMENT): Primary | ICD-10-CM

## 2022-11-02 DIAGNOSIS — R13.19 ESOPHAGEAL DYSPHAGIA: ICD-10-CM

## 2022-11-02 DIAGNOSIS — D50.0 IRON DEFICIENCY ANEMIA DUE TO CHRONIC BLOOD LOSS: ICD-10-CM

## 2022-11-02 DIAGNOSIS — I35.1 NONRHEUMATIC AORTIC VALVE INSUFFICIENCY: ICD-10-CM

## 2022-11-02 LAB
ACT BLD: 132 SECONDS (ref 82–152)
ACT BLD: 132 SECONDS (ref 82–152)
ACT BLD: 358 SECONDS (ref 82–152)
ACT BLD: 428 SECONDS (ref 82–152)
ACT BLD: 439 SECONDS (ref 82–152)
ACT BLD: 532 SECONDS (ref 82–152)
ALBUMIN SERPL-MCNC: 3.8 G/DL (ref 3.5–5.2)
ALBUMIN SERPL-MCNC: 4.2 G/DL (ref 3.5–5.2)
ANION GAP SERPL CALCULATED.3IONS-SCNC: 10.7 MMOL/L (ref 5–15)
ANION GAP SERPL CALCULATED.3IONS-SCNC: 11.9 MMOL/L (ref 5–15)
APTT PPP: 36.5 SECONDS (ref 22.7–35.4)
APTT PPP: 38.5 SECONDS (ref 22.7–35.4)
APTT PPP: 43.4 SECONDS (ref 22.7–35.4)
ARTERIAL PATENCY WRIST A: ABNORMAL
ATMOSPHERIC PRESS: 760.5 MMHG
ATMOSPHERIC PRESS: 761.4 MMHG
ATMOSPHERIC PRESS: 762.5 MMHG
BASE EXCESS BLDA CALC-SCNC: -1 MMOL/L (ref -5–5)
BASE EXCESS BLDA CALC-SCNC: -2 MMOL/L (ref -5–5)
BASE EXCESS BLDA CALC-SCNC: -2.2 MMOL/L (ref 0–2)
BASE EXCESS BLDA CALC-SCNC: -3.1 MMOL/L (ref 0–2)
BASE EXCESS BLDA CALC-SCNC: 0 MMOL/L (ref -5–5)
BASE EXCESS BLDA CALC-SCNC: 1 MMOL/L (ref -5–5)
BASE EXCESS BLDA CALC-SCNC: 1 MMOL/L (ref -5–5)
BASE EXCESS BLDA CALC-SCNC: 1 MMOL/L (ref 0–2)
BASE EXCESS BLDA CALC-SCNC: 2 MMOL/L (ref -5–5)
BASOPHILS # BLD AUTO: 0.02 10*3/MM3 (ref 0–0.2)
BASOPHILS NFR BLD AUTO: 0.2 % (ref 0–1.5)
BDY SITE: ABNORMAL
BUN SERPL-MCNC: 24 MG/DL (ref 8–23)
BUN SERPL-MCNC: 26 MG/DL (ref 8–23)
BUN/CREAT SERPL: 15 (ref 7–25)
BUN/CREAT SERPL: 15 (ref 7–25)
CA-I BLD-MCNC: 4.3 MG/DL (ref 4.6–5.4)
CA-I BLDA-SCNC: ABNORMAL MMOL/L
CA-I SERPL ISE-MCNC: 1.07 MMOL/L (ref 1.15–1.35)
CALCIUM SPEC-SCNC: 8.3 MG/DL (ref 8.6–10.5)
CALCIUM SPEC-SCNC: 8.8 MG/DL (ref 8.6–10.5)
CHLORIDE SERPL-SCNC: 106 MMOL/L (ref 98–107)
CHLORIDE SERPL-SCNC: 108 MMOL/L (ref 98–107)
CO2 BLDA-SCNC: 26 MMOL/L (ref 24–29)
CO2 BLDA-SCNC: 26 MMOL/L (ref 24–29)
CO2 BLDA-SCNC: 27 MMOL/L (ref 24–29)
CO2 BLDA-SCNC: 27 MMOL/L (ref 24–29)
CO2 BLDA-SCNC: 28 MMOL/L (ref 24–29)
CO2 BLDA-SCNC: 28 MMOL/L (ref 24–29)
CO2 BLDA-SCNC: 30 MMOL/L (ref 24–29)
CO2 BLDA-SCNC: 33 MMOL/L (ref 24–29)
CO2 SERPL-SCNC: 22.3 MMOL/L (ref 22–29)
CO2 SERPL-SCNC: 23.1 MMOL/L (ref 22–29)
CREAT SERPL-MCNC: 1.6 MG/DL (ref 0.76–1.27)
CREAT SERPL-MCNC: 1.73 MG/DL (ref 0.76–1.27)
DEPRECATED RDW RBC AUTO: 41.3 FL (ref 37–54)
DEPRECATED RDW RBC AUTO: 43.1 FL (ref 37–54)
DEPRECATED RDW RBC AUTO: 43.2 FL (ref 37–54)
DEPRECATED RDW RBC AUTO: 44.9 FL (ref 37–54)
EGFRCR SERPLBLD CKD-EPI 2021: 39.4 ML/MIN/1.73
EGFRCR SERPLBLD CKD-EPI 2021: 43.3 ML/MIN/1.73
EOSINOPHIL # BLD AUTO: 0.05 10*3/MM3 (ref 0–0.4)
EOSINOPHIL NFR BLD AUTO: 0.5 % (ref 0.3–6.2)
ERYTHROCYTE [DISTWIDTH] IN BLOOD BY AUTOMATED COUNT: 13.1 % (ref 12.3–15.4)
ERYTHROCYTE [DISTWIDTH] IN BLOOD BY AUTOMATED COUNT: 13.2 % (ref 12.3–15.4)
ERYTHROCYTE [DISTWIDTH] IN BLOOD BY AUTOMATED COUNT: 13.3 % (ref 12.3–15.4)
ERYTHROCYTE [DISTWIDTH] IN BLOOD BY AUTOMATED COUNT: 13.6 % (ref 12.3–15.4)
FIBRINOGEN PPP-MCNC: 193 MG/DL (ref 219–464)
FIBRINOGEN PPP-MCNC: 296 MG/DL (ref 219–464)
FIBRINOGEN PPP-MCNC: 304 MG/DL (ref 219–464)
GLUCOSE BLDC GLUCOMTR-MCNC: 106 MG/DL (ref 70–130)
GLUCOSE BLDC GLUCOMTR-MCNC: 107 MG/DL (ref 70–130)
GLUCOSE BLDC GLUCOMTR-MCNC: 115 MG/DL (ref 70–130)
GLUCOSE BLDC GLUCOMTR-MCNC: 119 MG/DL (ref 70–130)
GLUCOSE BLDC GLUCOMTR-MCNC: 125 MG/DL (ref 70–130)
GLUCOSE BLDC GLUCOMTR-MCNC: 126 MG/DL (ref 70–130)
GLUCOSE BLDC GLUCOMTR-MCNC: 127 MG/DL (ref 70–130)
GLUCOSE BLDC GLUCOMTR-MCNC: 127 MG/DL (ref 70–130)
GLUCOSE BLDC GLUCOMTR-MCNC: 129 MG/DL (ref 70–130)
GLUCOSE BLDC GLUCOMTR-MCNC: 134 MG/DL (ref 70–130)
GLUCOSE BLDC GLUCOMTR-MCNC: 135 MG/DL (ref 70–130)
GLUCOSE BLDC GLUCOMTR-MCNC: 143 MG/DL (ref 70–130)
GLUCOSE BLDC GLUCOMTR-MCNC: 145 MG/DL (ref 70–130)
GLUCOSE BLDC GLUCOMTR-MCNC: 169 MG/DL (ref 70–130)
GLUCOSE BLDC GLUCOMTR-MCNC: 182 MG/DL (ref 70–130)
GLUCOSE BLDC GLUCOMTR-MCNC: 192 MG/DL (ref 70–130)
GLUCOSE BLDC GLUCOMTR-MCNC: 201 MG/DL (ref 70–130)
GLUCOSE BLDC GLUCOMTR-MCNC: 220 MG/DL (ref 70–130)
GLUCOSE SERPL-MCNC: 123 MG/DL (ref 65–99)
GLUCOSE SERPL-MCNC: 139 MG/DL (ref 65–99)
HCO3 BLDA-SCNC: 22.4 MMOL/L (ref 22–28)
HCO3 BLDA-SCNC: 22.7 MMOL/L (ref 22–28)
HCO3 BLDA-SCNC: 24.9 MMOL/L (ref 22–26)
HCO3 BLDA-SCNC: 25 MMOL/L (ref 22–26)
HCO3 BLDA-SCNC: 25.1 MMOL/L (ref 22–26)
HCO3 BLDA-SCNC: 25.1 MMOL/L (ref 22–26)
HCO3 BLDA-SCNC: 25.2 MMOL/L (ref 22–28)
HCO3 BLDA-SCNC: 26.2 MMOL/L (ref 22–26)
HCO3 BLDA-SCNC: 26.2 MMOL/L (ref 22–26)
HCO3 BLDA-SCNC: 27.8 MMOL/L (ref 22–26)
HCO3 BLDA-SCNC: 30.5 MMOL/L (ref 22–26)
HCT VFR BLD AUTO: 24.8 % (ref 37.5–51)
HCT VFR BLD AUTO: 24.9 % (ref 37.5–51)
HCT VFR BLD AUTO: 27.6 % (ref 37.5–51)
HCT VFR BLD AUTO: 28 % (ref 37.5–51)
HCT VFR BLDA CALC: 22 % (ref 38–51)
HCT VFR BLDA CALC: 23 % (ref 38–51)
HCT VFR BLDA CALC: 29 % (ref 38–51)
HCT VFR BLDA CALC: 29 % (ref 38–51)
HCT VFR BLDA CALC: 32 % (ref 38–51)
HGB BLD-MCNC: 8 G/DL (ref 13–17.7)
HGB BLD-MCNC: 8.1 G/DL (ref 13–17.7)
HGB BLD-MCNC: 8.8 G/DL (ref 13–17.7)
HGB BLD-MCNC: 9.6 G/DL (ref 13–17.7)
HGB BLDA-MCNC: 10.9 G/DL (ref 12–17)
HGB BLDA-MCNC: 7.5 G/DL (ref 12–17)
HGB BLDA-MCNC: 7.8 G/DL (ref 12–17)
HGB BLDA-MCNC: 9.9 G/DL (ref 12–17)
HGB BLDA-MCNC: 9.9 G/DL (ref 12–17)
IMM GRANULOCYTES # BLD AUTO: 0.05 10*3/MM3 (ref 0–0.05)
IMM GRANULOCYTES NFR BLD AUTO: 0.5 % (ref 0–0.5)
INHALED O2 CONCENTRATION: 100 %
INHALED O2 CONCENTRATION: 40 %
INHALED O2 CONCENTRATION: 40 %
INR PPP: 1.3 (ref 0.9–1.1)
INR PPP: 1.34 (ref 0.9–1.1)
INR PPP: 1.57 (ref 0.9–1.1)
INR PPP: 1.6 (ref 0.8–1.2)
LYMPHOCYTES # BLD AUTO: 0.42 10*3/MM3 (ref 0.7–3.1)
LYMPHOCYTES NFR BLD AUTO: 4.4 % (ref 19.6–45.3)
MAGNESIUM SERPL-MCNC: 2.3 MG/DL (ref 1.6–2.4)
MAGNESIUM SERPL-MCNC: 2.8 MG/DL (ref 1.6–2.4)
MCH RBC QN AUTO: 28.4 PG (ref 26.6–33)
MCH RBC QN AUTO: 28.9 PG (ref 26.6–33)
MCH RBC QN AUTO: 28.9 PG (ref 26.6–33)
MCH RBC QN AUTO: 29.2 PG (ref 26.6–33)
MCHC RBC AUTO-ENTMCNC: 31.9 G/DL (ref 31.5–35.7)
MCHC RBC AUTO-ENTMCNC: 32.3 G/DL (ref 31.5–35.7)
MCHC RBC AUTO-ENTMCNC: 32.5 G/DL (ref 31.5–35.7)
MCHC RBC AUTO-ENTMCNC: 34.3 G/DL (ref 31.5–35.7)
MCV RBC AUTO: 85.1 FL (ref 79–97)
MCV RBC AUTO: 88.9 FL (ref 79–97)
MCV RBC AUTO: 89 FL (ref 79–97)
MCV RBC AUTO: 89.5 FL (ref 79–97)
MODALITY: ABNORMAL
MONOCYTES # BLD AUTO: 0.56 10*3/MM3 (ref 0.1–0.9)
MONOCYTES NFR BLD AUTO: 5.9 % (ref 5–12)
NEUTROPHILS NFR BLD AUTO: 8.43 10*3/MM3 (ref 1.7–7)
NEUTROPHILS NFR BLD AUTO: 88.5 % (ref 42.7–76)
NRBC BLD AUTO-RTO: 0 /100 WBC (ref 0–0.2)
O2 A-A PPRESDIFF RESPIRATORY: 0.5 MMHG
O2 A-A PPRESDIFF RESPIRATORY: 0.6 MMHG
O2 A-A PPRESDIFF RESPIRATORY: 0.7 MMHG
PCO2 BLDA: 37.9 MM HG (ref 35–45)
PCO2 BLDA: 38.2 MM HG (ref 35–45)
PCO2 BLDA: 41.1 MM HG (ref 35–45)
PCO2 BLDA: 41.1 MM HG (ref 35–45)
PCO2 BLDA: 44.9 MM HG (ref 35–45)
PCO2 BLDA: 47.9 MM HG (ref 35–45)
PCO2 BLDA: 50.3 MM HG (ref 35–45)
PCO2 BLDA: 54.9 MM HG (ref 35–45)
PCO2 BLDA: 59 MM HG (ref 35–45)
PCO2 BLDA: 59.6 MM HG (ref 35–45)
PCO2 BLDA: 87.6 MM HG (ref 35–45)
PEEP RESPIRATORY: 7.5 CM[H2O]
PH BLDA: 7.24 PH UNITS (ref 7.35–7.6)
PH BLDA: 7.28 PH UNITS (ref 7.35–7.6)
PH BLDA: 7.28 PH UNITS (ref 7.35–7.6)
PH BLDA: 7.33 PH UNITS (ref 7.35–7.6)
PH BLDA: 7.34 PH UNITS (ref 7.35–7.45)
PH BLDA: 7.34 PH UNITS (ref 7.35–7.6)
PH BLDA: 7.34 PH UNITS (ref 7.35–7.6)
PH BLDA: 7.38 PH UNITS (ref 7.35–7.45)
PH BLDA: 7.41 PH UNITS (ref 7.35–7.6)
PH BLDA: 7.42 PH UNITS (ref 7.35–7.6)
PH BLDA: 7.43 PH UNITS (ref 7.35–7.45)
PHOSPHATE SERPL-MCNC: 0.9 MG/DL (ref 2.5–4.5)
PHOSPHATE SERPL-MCNC: 1.3 MG/DL (ref 2.5–4.5)
PLATELET # BLD AUTO: 116 10*3/MM3 (ref 140–450)
PLATELET # BLD AUTO: 124 10*3/MM3 (ref 140–450)
PLATELET # BLD AUTO: 140 10*3/MM3 (ref 140–450)
PLATELET # BLD AUTO: 99 10*3/MM3 (ref 140–450)
PMV BLD AUTO: 9.2 FL (ref 6–12)
PMV BLD AUTO: 9.4 FL (ref 6–12)
PMV BLD AUTO: 9.5 FL (ref 6–12)
PMV BLD AUTO: 9.7 FL (ref 6–12)
PO2 BLDA: 131 MM HG (ref 80–100)
PO2 BLDA: 160.9 MM HG (ref 80–100)
PO2 BLDA: 344 MMHG (ref 80–105)
PO2 BLDA: 432 MMHG (ref 80–105)
PO2 BLDA: 459 MMHG (ref 80–105)
PO2 BLDA: 463 MMHG (ref 80–105)
PO2 BLDA: 466.8 MM HG (ref 80–100)
PO2 BLDA: 480 MMHG (ref 80–105)
PO2 BLDA: 492 MMHG (ref 80–105)
PO2 BLDA: 650 MMHG (ref 80–105)
PO2 BLDA: 70 MMHG (ref 80–105)
POTASSIUM BLDA-SCNC: 3.5 MMOL/L (ref 3.5–4.9)
POTASSIUM BLDA-SCNC: 3.7 MMOL/L (ref 3.5–4.9)
POTASSIUM BLDA-SCNC: 3.7 MMOL/L (ref 3.5–4.9)
POTASSIUM BLDA-SCNC: 3.9 MMOL/L (ref 3.5–4.9)
POTASSIUM BLDA-SCNC: 4 MMOL/L (ref 3.5–4.9)
POTASSIUM BLDA-SCNC: 4.1 MMOL/L (ref 3.5–4.9)
POTASSIUM SERPL-SCNC: 3.4 MMOL/L (ref 3.5–5.2)
POTASSIUM SERPL-SCNC: 3.6 MMOL/L (ref 3.5–5.2)
POTASSIUM SERPL-SCNC: 3.9 MMOL/L (ref 3.5–5.2)
PROTHROMBIN TIME: 16.4 SECONDS (ref 11.7–14.2)
PROTHROMBIN TIME: 16.7 SECONDS (ref 11.7–14.2)
PROTHROMBIN TIME: 19 SECONDS (ref 11.7–14.2)
PROTHROMBIN TIME: 19.3 SECONDS (ref 12.8–15.2)
RBC # BLD AUTO: 2.77 10*6/MM3 (ref 4.14–5.8)
RBC # BLD AUTO: 2.8 10*6/MM3 (ref 4.14–5.8)
RBC # BLD AUTO: 3.1 10*6/MM3 (ref 4.14–5.8)
RBC # BLD AUTO: 3.29 10*6/MM3 (ref 4.14–5.8)
SAO2 % BLDA: 100 % (ref 95–98)
SAO2 % BLDA: 92 % (ref 95–98)
SAO2 % BLDCOA: 100 % (ref 92–99)
SAO2 % BLDCOA: 98.9 % (ref 92–99)
SAO2 % BLDCOA: 99.3 % (ref 92–99)
SET MECH RESP RATE: 11
SET MECH RESP RATE: 12
SET MECH RESP RATE: 12
SODIUM SERPL-SCNC: 141 MMOL/L (ref 136–145)
SODIUM SERPL-SCNC: 141 MMOL/L (ref 136–145)
TOTAL RATE: 11 BREATHS/MINUTE
TOTAL RATE: 12 BREATHS/MINUTE
TOTAL RATE: 12 BREATHS/MINUTE
VENTILATOR MODE: ABNORMAL
VENTILATOR MODE: AC
VENTILATOR MODE: AC
VT ON VENT VENT: 700 ML
VT ON VENT VENT: 790 ML
VT ON VENT VENT: 800 ML
WBC NRBC COR # BLD: 7.9 10*3/MM3 (ref 3.4–10.8)
WBC NRBC COR # BLD: 8.32 10*3/MM3 (ref 3.4–10.8)
WBC NRBC COR # BLD: 8.63 10*3/MM3 (ref 3.4–10.8)
WBC NRBC COR # BLD: 9.53 10*3/MM3 (ref 3.4–10.8)

## 2022-11-02 PROCEDURE — 36430 TRANSFUSION BLD/BLD COMPNT: CPT

## 2022-11-02 PROCEDURE — 94799 UNLISTED PULMONARY SVC/PX: CPT

## 2022-11-02 PROCEDURE — 25010000002 MIDAZOLAM PER 1 MG: Performed by: ANESTHESIOLOGY

## 2022-11-02 PROCEDURE — 0 POTASSIUM CHLORIDE PER 2 MEQ: Performed by: NURSE PRACTITIONER

## 2022-11-02 PROCEDURE — 85018 HEMOGLOBIN: CPT

## 2022-11-02 PROCEDURE — 25010000002 AMIODARONE PER 30 MG: Performed by: ANESTHESIOLOGY

## 2022-11-02 PROCEDURE — 25010000002 HEPARIN (PORCINE) PER 1000 UNITS

## 2022-11-02 PROCEDURE — 25010000002 ALBUMIN HUMAN 25% PER 50 ML

## 2022-11-02 PROCEDURE — 94002 VENT MGMT INPAT INIT DAY: CPT

## 2022-11-02 PROCEDURE — 71045 X-RAY EXAM CHEST 1 VIEW: CPT

## 2022-11-02 PROCEDURE — 86901 BLOOD TYPING SEROLOGIC RH(D): CPT

## 2022-11-02 PROCEDURE — 85347 COAGULATION TIME ACTIVATED: CPT

## 2022-11-02 PROCEDURE — P9100 PATHOGEN TEST FOR PLATELETS: HCPCS

## 2022-11-02 PROCEDURE — 82947 ASSAY GLUCOSE BLOOD QUANT: CPT

## 2022-11-02 PROCEDURE — 85025 COMPLETE CBC W/AUTO DIFF WBC: CPT | Performed by: NURSE PRACTITIONER

## 2022-11-02 PROCEDURE — C1751 CATH, INF, PER/CENT/MIDLINE: HCPCS | Performed by: ANESTHESIOLOGY

## 2022-11-02 PROCEDURE — 85730 THROMBOPLASTIN TIME PARTIAL: CPT | Performed by: THORACIC SURGERY (CARDIOTHORACIC VASCULAR SURGERY)

## 2022-11-02 PROCEDURE — 82803 BLOOD GASES ANY COMBINATION: CPT

## 2022-11-02 PROCEDURE — 25010000002 ONDANSETRON PER 1 MG: Performed by: ANESTHESIOLOGY

## 2022-11-02 PROCEDURE — 85027 COMPLETE CBC AUTOMATED: CPT | Performed by: THORACIC SURGERY (CARDIOTHORACIC VASCULAR SURGERY)

## 2022-11-02 PROCEDURE — 3E080GC INTRODUCTION OF OTHER THERAPEUTIC SUBSTANCE INTO HEART, OPEN APPROACH: ICD-10-PCS | Performed by: THORACIC SURGERY (CARDIOTHORACIC VASCULAR SURGERY)

## 2022-11-02 PROCEDURE — 83735 ASSAY OF MAGNESIUM: CPT | Performed by: NURSE PRACTITIONER

## 2022-11-02 PROCEDURE — C1768 GRAFT, VASCULAR: HCPCS | Performed by: THORACIC SURGERY (CARDIOTHORACIC VASCULAR SURGERY)

## 2022-11-02 PROCEDURE — C1729 CATH, DRAINAGE: HCPCS | Performed by: THORACIC SURGERY (CARDIOTHORACIC VASCULAR SURGERY)

## 2022-11-02 PROCEDURE — 85384 FIBRINOGEN ACTIVITY: CPT | Performed by: NURSE PRACTITIONER

## 2022-11-02 PROCEDURE — P9035 PLATELET PHERES LEUKOREDUCED: HCPCS

## 2022-11-02 PROCEDURE — 33866 AORTIC HEMIARCH GRAFT: CPT | Performed by: PHYSICIAN ASSISTANT

## 2022-11-02 PROCEDURE — 25010000002 FOSPHENYTOIN 500 MG PE/10ML SOLUTION 10 ML VIAL: Performed by: ANESTHESIOLOGY

## 2022-11-02 PROCEDURE — 25010000002 CALCIUM GLUCONATE-NACL 1-0.675 GM/50ML-% SOLUTION: Performed by: NURSE PRACTITIONER

## 2022-11-02 PROCEDURE — 33405 REPLACEMENT AORTIC VALVE OPN: CPT | Performed by: PHYSICIAN ASSISTANT

## 2022-11-02 PROCEDURE — 25010000002 ONDANSETRON PER 1 MG

## 2022-11-02 PROCEDURE — 25010000002 CEFAZOLIN IN DEXTROSE 2-4 GM/100ML-% SOLUTION: Performed by: NURSE PRACTITIONER

## 2022-11-02 PROCEDURE — 33405 REPLACEMENT AORTIC VALVE OPN: CPT | Performed by: THORACIC SURGERY (CARDIOTHORACIC VASCULAR SURGERY)

## 2022-11-02 PROCEDURE — P9012 CRYOPRECIPITATE EACH UNIT: HCPCS

## 2022-11-02 PROCEDURE — C1713 ANCHOR/SCREW BN/BN,TIS/BN: HCPCS | Performed by: THORACIC SURGERY (CARDIOTHORACIC VASCULAR SURGERY)

## 2022-11-02 PROCEDURE — 85610 PROTHROMBIN TIME: CPT

## 2022-11-02 PROCEDURE — 02RF08Z REPLACEMENT OF AORTIC VALVE WITH ZOOPLASTIC TISSUE, OPEN APPROACH: ICD-10-PCS | Performed by: THORACIC SURGERY (CARDIOTHORACIC VASCULAR SURGERY)

## 2022-11-02 PROCEDURE — 0 METHADONE PER 10 MG: Performed by: ANESTHESIOLOGY

## 2022-11-02 PROCEDURE — 82330 ASSAY OF CALCIUM: CPT | Performed by: NURSE PRACTITIONER

## 2022-11-02 PROCEDURE — 25010000002 PROTAMINE SULFATE PER 10 MG: Performed by: ANESTHESIOLOGY

## 2022-11-02 PROCEDURE — P9041 ALBUMIN (HUMAN),5%, 50ML: HCPCS | Performed by: ANESTHESIOLOGY

## 2022-11-02 PROCEDURE — 85730 THROMBOPLASTIN TIME PARTIAL: CPT | Performed by: NURSE PRACTITIONER

## 2022-11-02 PROCEDURE — 25010000002 FENTANYL CITRATE (PF) 100 MCG/2ML SOLUTION: Performed by: ANESTHESIOLOGY

## 2022-11-02 PROCEDURE — 82962 GLUCOSE BLOOD TEST: CPT

## 2022-11-02 PROCEDURE — 63710000001 INSULIN REGULAR HUMAN PER 5 UNITS: Performed by: ANESTHESIOLOGY

## 2022-11-02 PROCEDURE — 84132 ASSAY OF SERUM POTASSIUM: CPT | Performed by: THORACIC SURGERY (CARDIOTHORACIC VASCULAR SURGERY)

## 2022-11-02 PROCEDURE — 88304 TISSUE EXAM BY PATHOLOGIST: CPT | Performed by: THORACIC SURGERY (CARDIOTHORACIC VASCULAR SURGERY)

## 2022-11-02 PROCEDURE — P9041 ALBUMIN (HUMAN),5%, 50ML: HCPCS | Performed by: THORACIC SURGERY (CARDIOTHORACIC VASCULAR SURGERY)

## 2022-11-02 PROCEDURE — 33999 UNLISTED PX CARDIAC SURGERY: CPT | Performed by: THORACIC SURGERY (CARDIOTHORACIC VASCULAR SURGERY)

## 2022-11-02 PROCEDURE — 94760 N-INVAS EAR/PLS OXIMETRY 1: CPT

## 2022-11-02 PROCEDURE — 80069 RENAL FUNCTION PANEL: CPT | Performed by: NURSE PRACTITIONER

## 2022-11-02 PROCEDURE — 86927 PLASMA FRESH FROZEN: CPT

## 2022-11-02 PROCEDURE — 85610 PROTHROMBIN TIME: CPT | Performed by: NURSE PRACTITIONER

## 2022-11-02 PROCEDURE — 25010000002 HYDROMORPHONE 1 MG/ML SOLUTION

## 2022-11-02 PROCEDURE — 5A1221Z PERFORMANCE OF CARDIAC OUTPUT, CONTINUOUS: ICD-10-PCS | Performed by: THORACIC SURGERY (CARDIOTHORACIC VASCULAR SURGERY)

## 2022-11-02 PROCEDURE — 86900 BLOOD TYPING SEROLOGIC ABO: CPT

## 2022-11-02 PROCEDURE — 85014 HEMATOCRIT: CPT

## 2022-11-02 PROCEDURE — 94761 N-INVAS EAR/PLS OXIMETRY MLT: CPT

## 2022-11-02 PROCEDURE — 25010000002 METHYLPREDNISOLONE PER 125 MG: Performed by: ANESTHESIOLOGY

## 2022-11-02 PROCEDURE — 33859 AS-AORT GRF F/DS OTH/THN DSJ: CPT | Performed by: PHYSICIAN ASSISTANT

## 2022-11-02 PROCEDURE — 25010000002 PROPOFOL 10 MG/ML EMULSION: Performed by: ANESTHESIOLOGY

## 2022-11-02 PROCEDURE — 25010000002 VANCOMYCIN 1 G RECONSTITUTED SOLUTION

## 2022-11-02 PROCEDURE — 25010000002 HEPARIN (PORCINE) PER 1000 UNITS: Performed by: ANESTHESIOLOGY

## 2022-11-02 PROCEDURE — 85384 FIBRINOGEN ACTIVITY: CPT | Performed by: THORACIC SURGERY (CARDIOTHORACIC VASCULAR SURGERY)

## 2022-11-02 PROCEDURE — 25010000002 ALBUMIN HUMAN 5% PER 50 ML: Performed by: THORACIC SURGERY (CARDIOTHORACIC VASCULAR SURGERY)

## 2022-11-02 PROCEDURE — 93318 ECHO TRANSESOPHAGEAL INTRAOP: CPT | Performed by: ANESTHESIOLOGY

## 2022-11-02 PROCEDURE — P9047 ALBUMIN (HUMAN), 25%, 50ML: HCPCS

## 2022-11-02 PROCEDURE — 25010000002 MAGNESIUM SULFATE PER 500 MG OF MAGNESIUM: Performed by: ANESTHESIOLOGY

## 2022-11-02 PROCEDURE — 02CX0ZZ EXTIRPATION OF MATTER FROM THORACIC AORTA, ASCENDING/ARCH, OPEN APPROACH: ICD-10-PCS | Performed by: THORACIC SURGERY (CARDIOTHORACIC VASCULAR SURGERY)

## 2022-11-02 PROCEDURE — 33859 AS-AORT GRF F/DS OTH/THN DSJ: CPT | Performed by: THORACIC SURGERY (CARDIOTHORACIC VASCULAR SURGERY)

## 2022-11-02 PROCEDURE — C1889 IMPLANT/INSERT DEVICE, NOC: HCPCS | Performed by: THORACIC SURGERY (CARDIOTHORACIC VASCULAR SURGERY)

## 2022-11-02 PROCEDURE — 33866 AORTIC HEMIARCH GRAFT: CPT | Performed by: THORACIC SURGERY (CARDIOTHORACIC VASCULAR SURGERY)

## 2022-11-02 PROCEDURE — 25010000002 ALBUMIN HUMAN 5% PER 50 ML: Performed by: ANESTHESIOLOGY

## 2022-11-02 PROCEDURE — 85027 COMPLETE CBC AUTOMATED: CPT | Performed by: NURSE PRACTITIONER

## 2022-11-02 PROCEDURE — 25010000002 HYDROMORPHONE 1 MG/ML SOLUTION: Performed by: ANESTHESIOLOGY

## 2022-11-02 PROCEDURE — 25010000002 PHENYLEPHRINE 10 MG/ML SOLUTION 5 ML VIAL: Performed by: ANESTHESIOLOGY

## 2022-11-02 PROCEDURE — 25010000002 MAGNESIUM SULFATE IN D5W 1G/100ML (PREMIX) 1-5 GM/100ML-% SOLUTION: Performed by: NURSE PRACTITIONER

## 2022-11-02 PROCEDURE — 88305 TISSUE EXAM BY PATHOLOGIST: CPT | Performed by: THORACIC SURGERY (CARDIOTHORACIC VASCULAR SURGERY)

## 2022-11-02 PROCEDURE — 02RX0JZ REPLACEMENT OF THORACIC AORTA, ASCENDING/ARCH WITH SYNTHETIC SUBSTITUTE, OPEN APPROACH: ICD-10-PCS | Performed by: THORACIC SURGERY (CARDIOTHORACIC VASCULAR SURGERY)

## 2022-11-02 PROCEDURE — 33999 UNLISTED PX CARDIAC SURGERY: CPT | Performed by: PHYSICIAN ASSISTANT

## 2022-11-02 PROCEDURE — P9059 PLASMA, FRZ BETWEEN 8-24HOUR: HCPCS

## 2022-11-02 PROCEDURE — 85610 PROTHROMBIN TIME: CPT | Performed by: THORACIC SURGERY (CARDIOTHORACIC VASCULAR SURGERY)

## 2022-11-02 DEVICE — FLOSEAL HEMOSTATIC MATRIX, 5ML
Type: IMPLANTABLE DEVICE | Site: AORTA | Status: FUNCTIONAL
Brand: FLOSEAL HEMOSTATIC MATRIX

## 2022-11-02 DEVICE — SS SUTURE, 4 PER SLEEVE
Type: IMPLANTABLE DEVICE | Site: STERNUM | Status: FUNCTIONAL
Brand: MYO/WIRE II

## 2022-11-02 DEVICE — SS SUTURE, 3 PER SLEEVE
Type: IMPLANTABLE DEVICE | Site: STERNUM | Status: FUNCTIONAL
Brand: MYO/WIRE II

## 2022-11-02 DEVICE — ABSORBABLE HEMOSTAT (OXIDIZED REGENERATED CELLULOSE, U.S.P.)
Type: IMPLANTABLE DEVICE | Site: AORTA | Status: FUNCTIONAL
Brand: SURGICEL

## 2022-11-02 DEVICE — GELWEAVE GELATIN IMPREGNATED WOVEN VASCULAR PROSTHESIS  PRECURVED  ANTE-FLO
Type: IMPLANTABLE DEVICE | Site: AORTA | Status: FUNCTIONAL
Brand: GELWEAVE™

## 2022-11-02 DEVICE — VLV PERICARD PERIMOUNT MAGNA EASE 27: Type: IMPLANTABLE DEVICE | Site: HEART | Status: FUNCTIONAL

## 2022-11-02 RX ORDER — SODIUM CHLORIDE 0.9 % (FLUSH) 0.9 %
3 SYRINGE (ML) INJECTION EVERY 12 HOURS SCHEDULED
Status: DISCONTINUED | OUTPATIENT
Start: 2022-11-02 | End: 2022-11-02 | Stop reason: HOSPADM

## 2022-11-02 RX ORDER — NALOXONE HCL 0.4 MG/ML
0.4 VIAL (ML) INJECTION
Status: DISCONTINUED | OUTPATIENT
Start: 2022-11-02 | End: 2022-11-08 | Stop reason: HOSPADM

## 2022-11-02 RX ORDER — FENTANYL CITRATE 50 UG/ML
50 INJECTION, SOLUTION INTRAMUSCULAR; INTRAVENOUS
Status: DISCONTINUED | OUTPATIENT
Start: 2022-11-02 | End: 2022-11-02 | Stop reason: HOSPADM

## 2022-11-02 RX ORDER — SODIUM CHLORIDE 0.9 % (FLUSH) 0.9 %
3-10 SYRINGE (ML) INJECTION AS NEEDED
Status: DISCONTINUED | OUTPATIENT
Start: 2022-11-02 | End: 2022-11-02 | Stop reason: HOSPADM

## 2022-11-02 RX ORDER — MILRINONE LACTATE 0.2 MG/ML
.25-.375 INJECTION, SOLUTION INTRAVENOUS CONTINUOUS PRN
Status: DISCONTINUED | OUTPATIENT
Start: 2022-11-02 | End: 2022-11-08

## 2022-11-02 RX ORDER — BISACODYL 5 MG/1
10 TABLET, DELAYED RELEASE ORAL DAILY PRN
Status: DISCONTINUED | OUTPATIENT
Start: 2022-11-02 | End: 2022-11-08 | Stop reason: HOSPADM

## 2022-11-02 RX ORDER — ACETAMINOPHEN 10 MG/ML
1000 INJECTION, SOLUTION INTRAVENOUS EVERY 8 HOURS
Status: COMPLETED | OUTPATIENT
Start: 2022-11-02 | End: 2022-11-03

## 2022-11-02 RX ORDER — ACETAMINOPHEN 650 MG/1
650 SUPPOSITORY RECTAL EVERY 4 HOURS
Status: ACTIVE | OUTPATIENT
Start: 2022-11-02 | End: 2022-11-03

## 2022-11-02 RX ORDER — POTASSIUM CHLORIDE 29.8 MG/ML
20 INJECTION INTRAVENOUS
Status: COMPLETED | OUTPATIENT
Start: 2022-11-02 | End: 2022-11-03

## 2022-11-02 RX ORDER — POTASSIUM CHLORIDE 7.45 MG/ML
10 INJECTION INTRAVENOUS
Status: DISCONTINUED | OUTPATIENT
Start: 2022-11-02 | End: 2022-11-08 | Stop reason: HOSPADM

## 2022-11-02 RX ORDER — ROCURONIUM BROMIDE 10 MG/ML
INJECTION, SOLUTION INTRAVENOUS AS NEEDED
Status: DISCONTINUED | OUTPATIENT
Start: 2022-11-02 | End: 2022-11-02 | Stop reason: SURG

## 2022-11-02 RX ORDER — MORPHINE SULFATE 2 MG/ML
1 INJECTION, SOLUTION INTRAMUSCULAR; INTRAVENOUS EVERY 4 HOURS PRN
Status: DISCONTINUED | OUTPATIENT
Start: 2022-11-02 | End: 2022-11-08 | Stop reason: HOSPADM

## 2022-11-02 RX ORDER — MIDAZOLAM HYDROCHLORIDE 1 MG/ML
INJECTION INTRAMUSCULAR; INTRAVENOUS AS NEEDED
Status: DISCONTINUED | OUTPATIENT
Start: 2022-11-02 | End: 2022-11-02 | Stop reason: SURG

## 2022-11-02 RX ORDER — NICOTINE POLACRILEX 4 MG
15 LOZENGE BUCCAL
Status: DISCONTINUED | OUTPATIENT
Start: 2022-11-02 | End: 2022-11-07

## 2022-11-02 RX ORDER — CEFAZOLIN SODIUM 2 G/100ML
2 INJECTION, SOLUTION INTRAVENOUS EVERY 8 HOURS
Status: COMPLETED | OUTPATIENT
Start: 2022-11-02 | End: 2022-11-04

## 2022-11-02 RX ORDER — OXYCODONE HYDROCHLORIDE 5 MG/1
10 TABLET ORAL EVERY 4 HOURS PRN
Status: DISCONTINUED | OUTPATIENT
Start: 2022-11-02 | End: 2022-11-08 | Stop reason: HOSPADM

## 2022-11-02 RX ORDER — HYDROCODONE BITARTRATE AND ACETAMINOPHEN 5; 325 MG/1; MG/1
2 TABLET ORAL EVERY 4 HOURS PRN
Status: DISCONTINUED | OUTPATIENT
Start: 2022-11-02 | End: 2022-11-08 | Stop reason: HOSPADM

## 2022-11-02 RX ORDER — PROTAMINE SULFATE 10 MG/ML
INJECTION, SOLUTION INTRAVENOUS AS NEEDED
Status: DISCONTINUED | OUTPATIENT
Start: 2022-11-02 | End: 2022-11-02 | Stop reason: SURG

## 2022-11-02 RX ORDER — DEXMEDETOMIDINE HYDROCHLORIDE 4 UG/ML
.2-1.5 INJECTION, SOLUTION INTRAVENOUS
Status: DISCONTINUED | OUTPATIENT
Start: 2022-11-02 | End: 2022-11-08

## 2022-11-02 RX ORDER — PANTOPRAZOLE SODIUM 40 MG/10ML
40 INJECTION, POWDER, LYOPHILIZED, FOR SOLUTION INTRAVENOUS ONCE
Status: COMPLETED | OUTPATIENT
Start: 2022-11-02 | End: 2022-11-02

## 2022-11-02 RX ORDER — ASPIRIN 81 MG/1
81 TABLET ORAL DAILY
Status: DISCONTINUED | OUTPATIENT
Start: 2022-11-03 | End: 2022-11-08 | Stop reason: HOSPADM

## 2022-11-02 RX ORDER — NOREPINEPHRINE BIT/0.9 % NACL 8 MG/250ML
.02-.2 INFUSION BOTTLE (ML) INTRAVENOUS CONTINUOUS PRN
Status: DISCONTINUED | OUTPATIENT
Start: 2022-11-02 | End: 2022-11-08 | Stop reason: HOSPADM

## 2022-11-02 RX ORDER — POTASSIUM CHLORIDE 29.8 MG/ML
20 INJECTION INTRAVENOUS
Status: DISCONTINUED | OUTPATIENT
Start: 2022-11-02 | End: 2022-11-08 | Stop reason: HOSPADM

## 2022-11-02 RX ORDER — CHLORHEXIDINE GLUCONATE 500 MG/1
1 CLOTH TOPICAL EVERY 12 HOURS PRN
Status: DISCONTINUED | OUTPATIENT
Start: 2022-11-02 | End: 2022-11-02 | Stop reason: HOSPADM

## 2022-11-02 RX ORDER — FAMOTIDINE 10 MG/ML
20 INJECTION, SOLUTION INTRAVENOUS ONCE
Status: COMPLETED | OUTPATIENT
Start: 2022-11-02 | End: 2022-11-02

## 2022-11-02 RX ORDER — SODIUM CHLORIDE 0.9 % (FLUSH) 0.9 %
30 SYRINGE (ML) INJECTION ONCE AS NEEDED
Status: DISCONTINUED | OUTPATIENT
Start: 2022-11-02 | End: 2022-11-02 | Stop reason: HOSPADM

## 2022-11-02 RX ORDER — ALBUMIN, HUMAN INJ 5% 5 %
25 SOLUTION INTRAVENOUS ONCE
Status: COMPLETED | OUTPATIENT
Start: 2022-11-02 | End: 2022-11-02

## 2022-11-02 RX ORDER — ACETAMINOPHEN 650 MG/1
650 SUPPOSITORY RECTAL EVERY 4 HOURS PRN
Status: DISCONTINUED | OUTPATIENT
Start: 2022-11-03 | End: 2022-11-08 | Stop reason: HOSPADM

## 2022-11-02 RX ORDER — ALBUMIN, HUMAN INJ 5% 5 %
SOLUTION INTRAVENOUS CONTINUOUS PRN
Status: DISCONTINUED | OUTPATIENT
Start: 2022-11-02 | End: 2022-11-02 | Stop reason: SURG

## 2022-11-02 RX ORDER — SODIUM CHLORIDE 9 MG/ML
INJECTION, SOLUTION INTRAVENOUS CONTINUOUS PRN
Status: DISCONTINUED | OUTPATIENT
Start: 2022-11-02 | End: 2022-11-02 | Stop reason: SURG

## 2022-11-02 RX ORDER — CEFAZOLIN SODIUM 2 G/100ML
2 INJECTION, SOLUTION INTRAVENOUS
Status: COMPLETED | OUTPATIENT
Start: 2022-11-02 | End: 2022-11-02

## 2022-11-02 RX ORDER — NICARDIPINE HYDROCHLORIDE 2.5 MG/ML
INJECTION INTRAVENOUS AS NEEDED
Status: DISCONTINUED | OUTPATIENT
Start: 2022-11-02 | End: 2022-11-02 | Stop reason: SURG

## 2022-11-02 RX ORDER — HEPARIN SODIUM 1000 [USP'U]/ML
INJECTION, SOLUTION INTRAVENOUS; SUBCUTANEOUS AS NEEDED
Status: DISCONTINUED | OUTPATIENT
Start: 2022-11-02 | End: 2022-11-02 | Stop reason: SURG

## 2022-11-02 RX ORDER — DEXTROSE MONOHYDRATE 25 G/50ML
10-50 INJECTION, SOLUTION INTRAVENOUS
Status: DISCONTINUED | OUTPATIENT
Start: 2022-11-02 | End: 2022-11-02 | Stop reason: HOSPADM

## 2022-11-02 RX ORDER — SODIUM CHLORIDE 9 MG/ML
30 INJECTION, SOLUTION INTRAVENOUS CONTINUOUS
Status: DISCONTINUED | OUTPATIENT
Start: 2022-11-02 | End: 2022-11-08 | Stop reason: HOSPADM

## 2022-11-02 RX ORDER — MAGNESIUM SULFATE 1 G/100ML
1 INJECTION INTRAVENOUS EVERY 8 HOURS
Status: DISPENSED | OUTPATIENT
Start: 2022-11-02 | End: 2022-11-03

## 2022-11-02 RX ORDER — SODIUM CHLORIDE 9 MG/ML
400 INJECTION, SOLUTION INTRAVENOUS ONCE
Status: DISCONTINUED | OUTPATIENT
Start: 2022-11-02 | End: 2022-11-02

## 2022-11-02 RX ORDER — METHADONE HYDROCHLORIDE 10 MG/ML
INJECTION, SOLUTION INTRAMUSCULAR; INTRAVENOUS; SUBCUTANEOUS AS NEEDED
Status: DISCONTINUED | OUTPATIENT
Start: 2022-11-02 | End: 2022-11-02 | Stop reason: SURG

## 2022-11-02 RX ORDER — CHLORHEXIDINE GLUCONATE 0.12 MG/ML
15 RINSE ORAL ONCE
Status: COMPLETED | OUTPATIENT
Start: 2022-11-02 | End: 2022-11-02

## 2022-11-02 RX ORDER — SODIUM CHLORIDE 9 MG/ML
30 INJECTION, SOLUTION INTRAVENOUS CONTINUOUS PRN
Status: DISCONTINUED | OUTPATIENT
Start: 2022-11-02 | End: 2022-11-02

## 2022-11-02 RX ORDER — ACETAMINOPHEN 325 MG/1
650 TABLET ORAL EVERY 4 HOURS PRN
Status: DISCONTINUED | OUTPATIENT
Start: 2022-11-03 | End: 2022-11-08 | Stop reason: HOSPADM

## 2022-11-02 RX ORDER — NICOTINE POLACRILEX 4 MG
15 LOZENGE BUCCAL
Status: DISCONTINUED | OUTPATIENT
Start: 2022-11-02 | End: 2022-11-02 | Stop reason: HOSPADM

## 2022-11-02 RX ORDER — NITROGLYCERIN 20 MG/100ML
5-200 INJECTION INTRAVENOUS
Status: DISCONTINUED | OUTPATIENT
Start: 2022-11-02 | End: 2022-11-08

## 2022-11-02 RX ORDER — ACETAMINOPHEN 160 MG/5ML
650 SOLUTION ORAL EVERY 4 HOURS PRN
Status: DISCONTINUED | OUTPATIENT
Start: 2022-11-03 | End: 2022-11-08 | Stop reason: HOSPADM

## 2022-11-02 RX ORDER — MIDAZOLAM HYDROCHLORIDE 1 MG/ML
2 INJECTION INTRAMUSCULAR; INTRAVENOUS
Status: DISCONTINUED | OUTPATIENT
Start: 2022-11-02 | End: 2022-11-08

## 2022-11-02 RX ORDER — POLYETHYLENE GLYCOL 3350 17 G/17G
17 POWDER, FOR SOLUTION ORAL DAILY PRN
Status: DISCONTINUED | OUTPATIENT
Start: 2022-11-02 | End: 2022-11-08 | Stop reason: HOSPADM

## 2022-11-02 RX ORDER — MAGNESIUM SULFATE HEPTAHYDRATE 500 MG/ML
INJECTION, SOLUTION INTRAMUSCULAR; INTRAVENOUS AS NEEDED
Status: DISCONTINUED | OUTPATIENT
Start: 2022-11-02 | End: 2022-11-02 | Stop reason: SURG

## 2022-11-02 RX ORDER — MIDAZOLAM HYDROCHLORIDE 1 MG/ML
0.5 INJECTION INTRAMUSCULAR; INTRAVENOUS
Status: DISCONTINUED | OUTPATIENT
Start: 2022-11-02 | End: 2022-11-02 | Stop reason: HOSPADM

## 2022-11-02 RX ORDER — ALBUMIN (HUMAN) 12.5 G/50ML
25 SOLUTION INTRAVENOUS ONCE
Status: DISCONTINUED | OUTPATIENT
Start: 2022-11-02 | End: 2022-11-02

## 2022-11-02 RX ORDER — AMOXICILLIN 250 MG
2 CAPSULE ORAL NIGHTLY
Status: DISCONTINUED | OUTPATIENT
Start: 2022-11-03 | End: 2022-11-08 | Stop reason: HOSPADM

## 2022-11-02 RX ORDER — ALPRAZOLAM 0.25 MG/1
0.25 TABLET ORAL EVERY 8 HOURS PRN
Status: DISCONTINUED | OUTPATIENT
Start: 2022-11-02 | End: 2022-11-08 | Stop reason: HOSPADM

## 2022-11-02 RX ORDER — PHENYLEPHRINE HCL IN 0.9% NACL 0.5 MG/5ML
.2-2 SYRINGE (ML) INTRAVENOUS CONTINUOUS PRN
Status: DISCONTINUED | OUTPATIENT
Start: 2022-11-02 | End: 2022-11-08

## 2022-11-02 RX ORDER — METOCLOPRAMIDE HYDROCHLORIDE 5 MG/ML
10 INJECTION INTRAMUSCULAR; INTRAVENOUS EVERY 6 HOURS
Status: DISCONTINUED | OUTPATIENT
Start: 2022-11-02 | End: 2022-11-02

## 2022-11-02 RX ORDER — CALCIUM GLUCONATE 20 MG/ML
2 INJECTION, SOLUTION INTRAVENOUS ONCE
Status: COMPLETED | OUTPATIENT
Start: 2022-11-02 | End: 2022-11-02

## 2022-11-02 RX ORDER — BISACODYL 10 MG
10 SUPPOSITORY, RECTAL RECTAL DAILY PRN
Status: DISCONTINUED | OUTPATIENT
Start: 2022-11-03 | End: 2022-11-08 | Stop reason: HOSPADM

## 2022-11-02 RX ORDER — FENTANYL CITRATE 50 UG/ML
INJECTION, SOLUTION INTRAMUSCULAR; INTRAVENOUS AS NEEDED
Status: DISCONTINUED | OUTPATIENT
Start: 2022-11-02 | End: 2022-11-02 | Stop reason: SURG

## 2022-11-02 RX ORDER — ONDANSETRON 2 MG/ML
INJECTION INTRAMUSCULAR; INTRAVENOUS AS NEEDED
Status: DISCONTINUED | OUTPATIENT
Start: 2022-11-02 | End: 2022-11-02 | Stop reason: SURG

## 2022-11-02 RX ORDER — DEXMEDETOMIDINE HYDROCHLORIDE 4 UG/ML
INJECTION, SOLUTION INTRAVENOUS CONTINUOUS PRN
Status: DISCONTINUED | OUTPATIENT
Start: 2022-11-02 | End: 2022-11-02 | Stop reason: SURG

## 2022-11-02 RX ORDER — MORPHINE SULFATE 2 MG/ML
4 INJECTION, SOLUTION INTRAMUSCULAR; INTRAVENOUS
Status: DISCONTINUED | OUTPATIENT
Start: 2022-11-02 | End: 2022-11-08

## 2022-11-02 RX ORDER — CYCLOBENZAPRINE HCL 10 MG
10 TABLET ORAL EVERY 8 HOURS PRN
Status: DISCONTINUED | OUTPATIENT
Start: 2022-11-03 | End: 2022-11-08 | Stop reason: HOSPADM

## 2022-11-02 RX ORDER — MEPERIDINE HYDROCHLORIDE 25 MG/ML
25 INJECTION INTRAMUSCULAR; INTRAVENOUS; SUBCUTANEOUS EVERY 4 HOURS PRN
Status: ACTIVE | OUTPATIENT
Start: 2022-11-02 | End: 2022-11-03

## 2022-11-02 RX ORDER — POTASSIUM CHLORIDE 1.5 G/1.77G
40 POWDER, FOR SOLUTION ORAL AS NEEDED
Status: DISCONTINUED | OUTPATIENT
Start: 2022-11-02 | End: 2022-11-08 | Stop reason: HOSPADM

## 2022-11-02 RX ORDER — PROPOFOL 10 MG/ML
VIAL (ML) INTRAVENOUS AS NEEDED
Status: DISCONTINUED | OUTPATIENT
Start: 2022-11-02 | End: 2022-11-02 | Stop reason: SURG

## 2022-11-02 RX ORDER — SODIUM CHLORIDE, SODIUM LACTATE, POTASSIUM CHLORIDE, CALCIUM CHLORIDE 600; 310; 30; 20 MG/100ML; MG/100ML; MG/100ML; MG/100ML
9 INJECTION, SOLUTION INTRAVENOUS CONTINUOUS
Status: DISCONTINUED | OUTPATIENT
Start: 2022-11-02 | End: 2022-11-02

## 2022-11-02 RX ORDER — PANTOPRAZOLE SODIUM 40 MG/1
40 TABLET, DELAYED RELEASE ORAL EVERY MORNING
Status: DISCONTINUED | OUTPATIENT
Start: 2022-11-03 | End: 2022-11-08 | Stop reason: HOSPADM

## 2022-11-02 RX ORDER — ONDANSETRON 2 MG/ML
4 INJECTION INTRAMUSCULAR; INTRAVENOUS EVERY 6 HOURS PRN
Status: DISCONTINUED | OUTPATIENT
Start: 2022-11-02 | End: 2022-11-08 | Stop reason: HOSPADM

## 2022-11-02 RX ORDER — AMIODARONE HYDROCHLORIDE 50 MG/ML
INJECTION, SOLUTION INTRAVENOUS AS NEEDED
Status: DISCONTINUED | OUTPATIENT
Start: 2022-11-02 | End: 2022-11-02 | Stop reason: SURG

## 2022-11-02 RX ORDER — NITROGLYCERIN 5 MG/ML
INJECTION, SOLUTION INTRAVENOUS AS NEEDED
Status: DISCONTINUED | OUTPATIENT
Start: 2022-11-02 | End: 2022-11-02 | Stop reason: SURG

## 2022-11-02 RX ORDER — CHLORHEXIDINE GLUCONATE 0.12 MG/ML
15 RINSE ORAL EVERY 12 HOURS
Status: DISCONTINUED | OUTPATIENT
Start: 2022-11-02 | End: 2022-11-05

## 2022-11-02 RX ORDER — DOPAMINE HYDROCHLORIDE 160 MG/100ML
2-20 INJECTION, SOLUTION INTRAVENOUS CONTINUOUS PRN
Status: DISCONTINUED | OUTPATIENT
Start: 2022-11-02 | End: 2022-11-08

## 2022-11-02 RX ORDER — ACETAMINOPHEN 325 MG/1
650 TABLET ORAL EVERY 4 HOURS
Status: ACTIVE | OUTPATIENT
Start: 2022-11-02 | End: 2022-11-03

## 2022-11-02 RX ORDER — ENOXAPARIN SODIUM 100 MG/ML
40 INJECTION SUBCUTANEOUS EVERY 24 HOURS
Status: DISCONTINUED | OUTPATIENT
Start: 2022-11-03 | End: 2022-11-04

## 2022-11-02 RX ORDER — POTASSIUM CHLORIDE 750 MG/1
40 TABLET, FILM COATED, EXTENDED RELEASE ORAL AS NEEDED
Status: DISCONTINUED | OUTPATIENT
Start: 2022-11-02 | End: 2022-11-08 | Stop reason: HOSPADM

## 2022-11-02 RX ORDER — LIDOCAINE HYDROCHLORIDE 10 MG/ML
0.5 INJECTION, SOLUTION EPIDURAL; INFILTRATION; INTRACAUDAL; PERINEURAL ONCE AS NEEDED
Status: DISCONTINUED | OUTPATIENT
Start: 2022-11-02 | End: 2022-11-02 | Stop reason: HOSPADM

## 2022-11-02 RX ORDER — METHYLPREDNISOLONE SODIUM SUCCINATE 125 MG/2ML
INJECTION, POWDER, LYOPHILIZED, FOR SOLUTION INTRAMUSCULAR; INTRAVENOUS AS NEEDED
Status: DISCONTINUED | OUTPATIENT
Start: 2022-11-02 | End: 2022-11-02 | Stop reason: SURG

## 2022-11-02 RX ORDER — DEXTROSE MONOHYDRATE 25 G/50ML
10-50 INJECTION, SOLUTION INTRAVENOUS
Status: DISCONTINUED | OUTPATIENT
Start: 2022-11-02 | End: 2022-11-07

## 2022-11-02 RX ORDER — ACETAMINOPHEN 160 MG/5ML
650 SOLUTION ORAL EVERY 4 HOURS
Status: ACTIVE | OUTPATIENT
Start: 2022-11-02 | End: 2022-11-03

## 2022-11-02 RX ORDER — ATORVASTATIN CALCIUM 20 MG/1
40 TABLET, FILM COATED ORAL NIGHTLY
Status: DISCONTINUED | OUTPATIENT
Start: 2022-11-02 | End: 2022-11-08 | Stop reason: HOSPADM

## 2022-11-02 RX ADMIN — NICARDIPINE HYDROCHLORIDE 0.3 MG: 2.5 INJECTION, SOLUTION INTRAVENOUS at 07:56

## 2022-11-02 RX ADMIN — HYDROMORPHONE HYDROCHLORIDE 0.5 MG: 1 INJECTION, SOLUTION INTRAMUSCULAR; INTRAVENOUS; SUBCUTANEOUS at 11:43

## 2022-11-02 RX ADMIN — SODIUM CHLORIDE: 9 INJECTION, SOLUTION INTRAVENOUS at 09:54

## 2022-11-02 RX ADMIN — PROTAMINE SULFATE 250 MG: 10 INJECTION, SOLUTION INTRAVENOUS at 10:13

## 2022-11-02 RX ADMIN — MIDAZOLAM 3 MG: 1 INJECTION INTRAMUSCULAR; INTRAVENOUS at 10:06

## 2022-11-02 RX ADMIN — FAMOTIDINE 20 MG: 10 INJECTION INTRAVENOUS at 06:05

## 2022-11-02 RX ADMIN — DEXMEDETOMIDINE HYDROCHLORIDE 0.4 MCG/KG/HR: 4 INJECTION, SOLUTION INTRAVENOUS at 15:21

## 2022-11-02 RX ADMIN — PROPOFOL 150 MG: 10 INJECTION, EMULSION INTRAVENOUS at 07:04

## 2022-11-02 RX ADMIN — SODIUM CHLORIDE: 9 INJECTION, SOLUTION INTRAVENOUS at 06:46

## 2022-11-02 RX ADMIN — CALCIUM GLUCONATE 2 G: 20 INJECTION, SOLUTION INTRAVENOUS at 14:29

## 2022-11-02 RX ADMIN — HEPARIN SODIUM 25000 UNITS: 1000 INJECTION INTRAVENOUS; SUBCUTANEOUS at 08:09

## 2022-11-02 RX ADMIN — MAGNESIUM SULFATE HEPTAHYDRATE 2 G: 500 INJECTION, SOLUTION INTRAMUSCULAR; INTRAVENOUS at 10:23

## 2022-11-02 RX ADMIN — METHYLPREDNISOLONE SODIUM SUCCINATE 250 MG: 125 INJECTION, POWDER, FOR SOLUTION INTRAMUSCULAR; INTRAVENOUS at 08:05

## 2022-11-02 RX ADMIN — POTASSIUM CHLORIDE 20 MEQ: 29.8 INJECTION, SOLUTION INTRAVENOUS at 23:11

## 2022-11-02 RX ADMIN — PROPOFOL 150 MG: 10 INJECTION, EMULSION INTRAVENOUS at 08:47

## 2022-11-02 RX ADMIN — ALBUMIN (HUMAN): 12.5 SOLUTION INTRAVENOUS at 10:53

## 2022-11-02 RX ADMIN — PANTOPRAZOLE SODIUM 40 MG: 40 INJECTION, POWDER, FOR SOLUTION INTRAVENOUS at 14:38

## 2022-11-02 RX ADMIN — NICARDIPINE HYDROCHLORIDE 0.3 MG: 2.5 INJECTION, SOLUTION INTRAVENOUS at 10:17

## 2022-11-02 RX ADMIN — MIDAZOLAM 0.5 MG: 1 INJECTION, SOLUTION INTRAMUSCULAR; INTRAVENOUS at 06:31

## 2022-11-02 RX ADMIN — METOPROLOL TARTRATE 12.5 MG: 25 TABLET, FILM COATED ORAL at 06:06

## 2022-11-02 RX ADMIN — AMIODARONE HYDROCHLORIDE 150 MG: 50 INJECTION, SOLUTION INTRAVENOUS at 09:46

## 2022-11-02 RX ADMIN — MAGNESIUM SULFATE IN DEXTROSE 1 G: 10 INJECTION, SOLUTION INTRAVENOUS at 18:09

## 2022-11-02 RX ADMIN — ONDANSETRON 4 MG: 2 INJECTION INTRAMUSCULAR; INTRAVENOUS at 12:29

## 2022-11-02 RX ADMIN — CEFAZOLIN SODIUM 2 G: 2 INJECTION, SOLUTION INTRAVENOUS at 18:09

## 2022-11-02 RX ADMIN — ALBUMIN (HUMAN) 25 G: 12.5 INJECTION, SOLUTION INTRAVENOUS at 16:32

## 2022-11-02 RX ADMIN — SODIUM CHLORIDE: 9 INJECTION, SOLUTION INTRAVENOUS at 06:50

## 2022-11-02 RX ADMIN — PHENYLEPHRINE HYDROCHLORIDE 0.25 MCG/KG/MIN: 10 INJECTION INTRAVENOUS at 10:52

## 2022-11-02 RX ADMIN — DEXMEDETOMIDINE HYDROCHLORIDE 1 MCG/KG/HR: 4 INJECTION, SOLUTION INTRAVENOUS at 06:50

## 2022-11-02 RX ADMIN — SODIUM CHLORIDE 489.5 MG PE: 900 INJECTION, SOLUTION INTRAVENOUS at 08:11

## 2022-11-02 RX ADMIN — SODIUM CHLORIDE 5 MG/HR: 9 INJECTION, SOLUTION INTRAVENOUS at 15:21

## 2022-11-02 RX ADMIN — CHLORHEXIDINE GLUCONATE 15 ML: 1.2 SOLUTION ORAL at 06:34

## 2022-11-02 RX ADMIN — SODIUM CHLORIDE 0.9 UNITS/HR: 900 INJECTION, SOLUTION INTRAVENOUS at 08:00

## 2022-11-02 RX ADMIN — CHLORHEXIDINE GLUCONATE 15 ML: 1.2 SOLUTION ORAL at 18:09

## 2022-11-02 RX ADMIN — METHADONE HYDROCHLORIDE 20 MG: 10 INJECTION, SOLUTION INTRAMUSCULAR; INTRAVENOUS; SUBCUTANEOUS at 07:15

## 2022-11-02 RX ADMIN — ACETAMINOPHEN 1000 MG: 10 INJECTION, SOLUTION INTRAVENOUS at 16:35

## 2022-11-02 RX ADMIN — ACETAMINOPHEN 1000 MG: 10 INJECTION, SOLUTION INTRAVENOUS at 21:08

## 2022-11-02 RX ADMIN — FENTANYL CITRATE 100 MCG: 50 INJECTION, SOLUTION INTRAMUSCULAR; INTRAVENOUS at 06:50

## 2022-11-02 RX ADMIN — CEFAZOLIN SODIUM 2 G: 2 INJECTION, SOLUTION INTRAVENOUS at 10:23

## 2022-11-02 RX ADMIN — MUPIROCIN 1 APPLICATION: 20 OINTMENT TOPICAL at 20:01

## 2022-11-02 RX ADMIN — PROPOFOL 50 MCG/KG/MIN: 10 INJECTION, EMULSION INTRAVENOUS at 06:50

## 2022-11-02 RX ADMIN — NITROGLYCERIN 100 MCG: 5 INJECTION, SOLUTION INTRAVENOUS at 07:59

## 2022-11-02 RX ADMIN — ROCURONIUM BROMIDE 100 MG: 50 INJECTION INTRAVENOUS at 07:04

## 2022-11-02 RX ADMIN — CEFAZOLIN SODIUM 2 G: 2 INJECTION, SOLUTION INTRAVENOUS at 07:20

## 2022-11-02 RX ADMIN — ALBUMIN (HUMAN): 12.5 SOLUTION INTRAVENOUS at 10:39

## 2022-11-02 RX ADMIN — NICARDIPINE HYDROCHLORIDE 5 MG/HR: 2.5 INJECTION, SOLUTION INTRAVENOUS at 10:19

## 2022-11-02 RX ADMIN — MIDAZOLAM 2 MG: 1 INJECTION INTRAMUSCULAR; INTRAVENOUS at 06:50

## 2022-11-02 NOTE — ANESTHESIA PROCEDURE NOTES
Diagnostic IntraOp Anesthesia Ashish    Procedure Performed: Diagnostic IntraOp Anesthesia Ashish       Start Time:        End Time:      Preanesthesia Checklist:  Patient identified, IV assessed, risks and benefits discussed, monitors and equipment assessed, procedure being performed at surgeon's request and anesthesia consent obtained.    General Procedure Information  Diagnostic Indications for Echo:  assessment of ascending aorta, assessment of surgical repair and defect repair evaluation  Physician Requesting Echo: Henrique Louie MD  ICD Code for Medical Necessity:  I34.0  Location performed:  OR  Intubated  Bite block placed  Heart visualized  Probe Insertion:  Easy  Probe Type:  Biplane  Modalities:  2D only, color flow mapping, continuous wave Doppler and pulse wave Doppler    Echocardiographic and Doppler Measurements    Ventricles    Right Ventricle:  Cavity size normal.  Hypertrophy not present.  Thrombus not present.  Global function normal.    Left Ventricle:  Cavity size dilated.  Thrombus not present.  Global Function normal.      Ventricular Regional Function:  1- Basal Anteroseptal:  normal    Wall Motion Comments:       No RWMA seen    Valves    Aortic Valve:  Annulus calcified.  Stenosis not present.  Mean Gradient: 48 mmHg.  Regurgitation severe.  Leaflets calcified.  Leaflet motions restricted.  Specific leaflet segments with abnormal motions are described in the following comments:       left    Mitral Valve:  Annulus normal.  Mean Gradient: 3 mmHg.  Regurgitation mild.  Leaflets normal.      Tricuspid Valve:  Annulus normal.  Stenosis not present.  Regurgitation mild.    Pulmonic Valve:  Annulus normal.  Stenosis not present.        Aorta    Ascending Aorta:  Size normal.    Aortic Arch:  Size dilated.  Diameter 4.6 cm.  Dissection not present.    Descending Aorta:  Size normal.  Mobile plaque present.    Other Aortic Findings:       Large pedunculated plaque/atheroma noted in early descending  aorta    Atria    Right Atrium:  Size normal.  Spontaneous echo contrast not present.  Thrombus not present.  Tumor not present.  Device not present.      Left Atrium:  Size dilated.  Spontaneous echo contrast not present.  Thrombus not present.  Tumor not present.  Device not present.    Left atrial appendage normal.      Septa    Atrial Septum:  Intra-atrial septal morphology aneurysmal.          Diastolic Function Measurements:  Diastolic Dysfunction Grade= I  E=  39 ms  A=  49 ms  E/A Ratio=  0.8  DT=  ms  S/D=   IVRT=    Other Findings  Pericardium:  normal  Pleural Effusion:  none  Pulmonary Arteries:  normal  Pulmonary Venous Flow:  normal    Anesthesia Information  Performed Personally  Anesthesiologist:  Fei Wolfe MD

## 2022-11-02 NOTE — ANESTHESIA PROCEDURE NOTES
Arterial Line      Patient reassessed immediately prior to procedure    Patient location during procedure: holding area  Start time: 11/2/2022 6:55 AM  Stop Time:11/2/2022 7:01 AM       Line placed for hemodynamic monitoring and ABGs/Labs/ISTAT.  Performed By   Anesthesiologist: Fei Wolfe MD   Preanesthetic Checklist  Completed: patient identified, IV checked, site marked, risks and benefits discussed, surgical consent, monitors and equipment checked, pre-op evaluation and timeout performed  Arterial Line Prep    Sterile Tech: gloves, mask, cap and sterile barriers  Prep: ChloraPrep  Patient monitoring: blood pressure monitoring, continuous pulse oximetry and EKG  Arterial Line Procedure   Laterality:left  Location:  radial artery  Catheter size: 20 G   Guidance: ultrasound guided and Ultrasound guidance was utlilized and needle placement was visualized under U/S  PROCEDURE NOTE/ULTRASOUND INTERPRETATION.  Using ultrasound guidance the potential vascular sites for insertion of the catheter were visualized to determine the patency of the vessel to be used for vascular access.  After selecting the appropriate site for insertion, the needle was visualized under ultrasound being inserted into the radial artery, followed by ultrasound confirmation of wire and catheter placement. There were no abnormalities seen on ultrasound; an image was taken; and the patient tolerated the procedure with no complications.   Number of attempts: 1  Successful placement: yes   Post Assessment   Dressing Type: occlusive dressing applied, secured with tape and wrist guard applied.   Complications no  Circ/Move/Sens Assessment: normal and unchanged.   Patient Tolerance: patient tolerated the procedure well with no apparent complications

## 2022-11-02 NOTE — ANESTHESIA PROCEDURE NOTES
PA Catheter      Patient reassessed immediately prior to procedure    Patient location during procedure: OR  Start time: 11/2/2022 7:27 AM  Stop Time:11/2/2022 7:29 AM  Indications: central pressure monitoring and MD/Surgeon request  Staff  Anesthesiologist: Fei Wolfe MD  Preanesthetic Checklist  Completed: patient identified, IV checked, risks and benefits discussed, surgical consent, monitors and equipment checked, pre-op evaluation and timeout performed  Central Line Prep  Sterile Tech:cap, gloves, gown, mask and sterile barriers  Prep: chloraprep  Patient monitoring: blood pressure monitoring, continuous pulse oximetry and EKG  Central Line Procedure  Laterality:right  Location:internal jugular  Catheter Type:Hallam-Amadou  Guidance:Waveform MonitoringImages: still images not obtained  Assessment  Assessement:blood return through all ports and free fluid flow  Complications:no  Patient Tolerance:patient tolerated the procedure well with no apparent complications

## 2022-11-02 NOTE — ANESTHESIA POSTPROCEDURE EVALUATION
Patient: Imer Jordan    Procedure Summary     Date: 11/02/22 Room / Location: [address] CARDIOVASCULAR OPERATING ROOM    Anesthesia Start: 0646 Anesthesia Stop: 1248    Procedure: INTRAOPERATIVE LIN; STERNOTOMY; AORTIC VALVE REPLACEMENT; ASCENDING AORTIC ANEURYSM REPAIR; ROOT REDUCTION WITH NEURO MONITORING AND CIRC ARREST; PRP. (Chest) Diagnosis:       Nonrheumatic aortic valve insufficiency      Ascending aorta dilation (HCC)      (Nonrheumatic aortic valve insufficiency [I35.1])      (Ascending aorta dilation (HCC) [I77.810])    Surgeons: Henrique Louie MD Provider: Fei Wolfe MD    Anesthesia Type: general, Estrella, CVL, PAC ASA Status: 4          Anesthesia Type: general, Haven, CVL, PAC    Vitals  Vitals Value Taken Time   /59 11/02/22 1701   Temp 36.5 °C (97.7 °F) 11/02/22 1729   Pulse 80 11/02/22 1729   Resp 12 11/02/22 1249   SpO2 100 % 11/02/22 1729   Vitals shown include unvalidated device data.        Post Anesthesia Care and Evaluation    Patient location during evaluation: bedside  Patient participation: complete - patient participated  Level of consciousness: awake and alert  Pain management: adequate    Airway patency: patent  Anesthetic complications: No anesthetic complications    Cardiovascular status: acceptable  Respiratory status: acceptable  Hydration status: acceptable    Comments: BP 95/60   Pulse 80   Temp 36.5 °C (97.7 °F)   Resp 12   SpO2 100%     Patient is stable postoperatively and has adequately recovered from anesthesia as described above unless otherwise noted

## 2022-11-02 NOTE — ANESTHESIA PROCEDURE NOTES
Central Line      Patient reassessed immediately prior to procedure    Patient location during procedure: OR  Start time: 11/2/2022 7:15 AM  Stop Time:11/2/2022 7:23 AM  Indications: central pressure monitoring and vascular access  Staff  Anesthesiologist: Fei Wolfe MD  Preanesthetic Checklist  Completed: patient identified, IV checked, site marked, risks and benefits discussed, surgical consent, monitors and equipment checked, pre-op evaluation and timeout performed  Central Line Prep  Sterile Tech:cap, gloves, gown, mask and sterile barriers  Prep: chloraprep  Patient monitoring: blood pressure monitoring, continuous pulse oximetry and EKG  Central Line Procedure  Laterality:right  Location:internal jugular  Catheter Type:double lumen and MAC  Catheter Size:9 Fr  Guidance:ultrasound guided  PROCEDURE NOTE/ULTRASOUND INTERPRETATION.  Using ultrasound guidance the potential vascular sites for insertion of the catheter were visualized to determine the patency of the vessel to be used for vascular access.  After selecting the appropriate site for insertion, the needle was visualized under ultrasound being inserted into the internal jugular vein, followed by ultrasound confirmation of wire and catheter placement. There were no abnormalities seen on ultrasound; an image was taken; and the patient tolerated the procedure with no complications. Images: still images obtained, printed/placed on chart  Assessment  Post procedure:biopatch applied, line sutured and occlusive dressing applied  Assessement:blood return through all ports, free fluid flow and chest x-ray ordered  Complications:no  Patient Tolerance:patient tolerated the procedure well with no apparent complications

## 2022-11-02 NOTE — ANESTHESIA PROCEDURE NOTES
Airway  Urgency: elective    Date/Time: 11/2/2022 7:10 AM  Airway not difficult    General Information and Staff    Patient location during procedure: OR  Anesthesiologist: Fei Wolfe MD    Indications and Patient Condition  Indications for airway management: airway protection    Preoxygenated: yes      Final Airway Details  Final airway type: endotracheal airway      Successful airway: ETT  Cuffed: yes   Successful intubation technique: direct laryngoscopy  Facilitating devices/methods: Bougie and anterior pressure/BURP  Endotracheal tube insertion site: oral  Blade: Waldo  Blade size: 4  ETT size (mm): 8.0  Cormack-Lehane Classification: grade III - view of epiglottis only  Placement verified by: chest auscultation and capnometry   Measured from: teeth  ETT/EBT  to teeth (cm): 23  Number of attempts at approach: 2  Assessment: lips, teeth, and gum same as pre-op and atraumatic intubation

## 2022-11-03 ENCOUNTER — APPOINTMENT (OUTPATIENT)
Dept: GENERAL RADIOLOGY | Facility: HOSPITAL | Age: 82
End: 2022-11-03

## 2022-11-03 LAB
ALBUMIN SERPL-MCNC: 3.8 G/DL (ref 3.5–5.2)
ANION GAP SERPL CALCULATED.3IONS-SCNC: 11 MMOL/L (ref 5–15)
BASOPHILS # BLD AUTO: 0 10*3/MM3 (ref 0–0.2)
BASOPHILS NFR BLD AUTO: 0 % (ref 0–1.5)
BH BB BLOOD EXPIRATION DATE: NORMAL
BH BB BLOOD TYPE BARCODE: 5100
BH BB BLOOD TYPE BARCODE: 6200
BH BB BLOOD TYPE BARCODE: 6200
BH BB BLOOD TYPE BARCODE: 7300
BH BB BLOOD TYPE BARCODE: 9500
BH BB DISPENSE STATUS: NORMAL
BH BB PRODUCT CODE: NORMAL
BH BB UNIT NUMBER: NORMAL
BUN SERPL-MCNC: 30 MG/DL (ref 8–23)
BUN/CREAT SERPL: 17 (ref 7–25)
CA-I BLD-MCNC: 4.6 MG/DL (ref 4.6–5.4)
CA-I SERPL ISE-MCNC: 1.15 MMOL/L (ref 1.15–1.35)
CALCIUM SPEC-SCNC: 8 MG/DL (ref 8.6–10.5)
CHLORIDE SERPL-SCNC: 109 MMOL/L (ref 98–107)
CO2 SERPL-SCNC: 22 MMOL/L (ref 22–29)
CREAT SERPL-MCNC: 1.76 MG/DL (ref 0.76–1.27)
DEPRECATED RDW RBC AUTO: 42.7 FL (ref 37–54)
DEPRECATED RDW RBC AUTO: 43.1 FL (ref 37–54)
EGFRCR SERPLBLD CKD-EPI 2021: 38.6 ML/MIN/1.73
EOSINOPHIL # BLD AUTO: 0 10*3/MM3 (ref 0–0.4)
EOSINOPHIL NFR BLD AUTO: 0 % (ref 0.3–6.2)
ERYTHROCYTE [DISTWIDTH] IN BLOOD BY AUTOMATED COUNT: 13.5 % (ref 12.3–15.4)
ERYTHROCYTE [DISTWIDTH] IN BLOOD BY AUTOMATED COUNT: 13.5 % (ref 12.3–15.4)
GLUCOSE BLDC GLUCOMTR-MCNC: 125 MG/DL (ref 70–130)
GLUCOSE BLDC GLUCOMTR-MCNC: 130 MG/DL (ref 70–130)
GLUCOSE BLDC GLUCOMTR-MCNC: 131 MG/DL (ref 70–130)
GLUCOSE BLDC GLUCOMTR-MCNC: 132 MG/DL (ref 70–130)
GLUCOSE BLDC GLUCOMTR-MCNC: 138 MG/DL (ref 70–130)
GLUCOSE BLDC GLUCOMTR-MCNC: 145 MG/DL (ref 70–130)
GLUCOSE BLDC GLUCOMTR-MCNC: 148 MG/DL (ref 70–130)
GLUCOSE BLDC GLUCOMTR-MCNC: 157 MG/DL (ref 70–130)
GLUCOSE BLDC GLUCOMTR-MCNC: 158 MG/DL (ref 70–130)
GLUCOSE BLDC GLUCOMTR-MCNC: 160 MG/DL (ref 70–130)
GLUCOSE BLDC GLUCOMTR-MCNC: 163 MG/DL (ref 70–130)
GLUCOSE SERPL-MCNC: 149 MG/DL (ref 65–99)
HCT VFR BLD AUTO: 23.6 % (ref 37.5–51)
HCT VFR BLD AUTO: 23.9 % (ref 37.5–51)
HGB BLD-MCNC: 8 G/DL (ref 13–17.7)
HGB BLD-MCNC: 8.1 G/DL (ref 13–17.7)
IMM GRANULOCYTES # BLD AUTO: 0.03 10*3/MM3 (ref 0–0.05)
IMM GRANULOCYTES NFR BLD AUTO: 0.3 % (ref 0–0.5)
INR PPP: 1.28 (ref 0.9–1.1)
INR PPP: 1.5 (ref 0.8–1.2)
IRON 24H UR-MRATE: 34 MCG/DL (ref 59–158)
IRON SATN MFR SERPL: 17 % (ref 20–50)
LAB AP CASE REPORT: NORMAL
LYMPHOCYTES # BLD AUTO: 0.27 10*3/MM3 (ref 0.7–3.1)
LYMPHOCYTES NFR BLD AUTO: 2.6 % (ref 19.6–45.3)
MAGNESIUM SERPL-MCNC: 2.3 MG/DL (ref 1.6–2.4)
MCH RBC QN AUTO: 28.7 PG (ref 26.6–33)
MCH RBC QN AUTO: 28.8 PG (ref 26.6–33)
MCHC RBC AUTO-ENTMCNC: 33.9 G/DL (ref 31.5–35.7)
MCHC RBC AUTO-ENTMCNC: 33.9 G/DL (ref 31.5–35.7)
MCV RBC AUTO: 84.8 FL (ref 79–97)
MCV RBC AUTO: 84.9 FL (ref 79–97)
MONOCYTES # BLD AUTO: 0.61 10*3/MM3 (ref 0.1–0.9)
MONOCYTES NFR BLD AUTO: 5.9 % (ref 5–12)
NEUTROPHILS NFR BLD AUTO: 9.5 10*3/MM3 (ref 1.7–7)
NEUTROPHILS NFR BLD AUTO: 91.2 % (ref 42.7–76)
NRBC BLD AUTO-RTO: 0 /100 WBC (ref 0–0.2)
PATH REPORT.FINAL DX SPEC: NORMAL
PATH REPORT.GROSS SPEC: NORMAL
PHOSPHATE SERPL-MCNC: 4.8 MG/DL (ref 2.5–4.5)
PLATELET # BLD AUTO: 88 10*3/MM3 (ref 140–450)
PLATELET # BLD AUTO: 93 10*3/MM3 (ref 140–450)
PMV BLD AUTO: 10.4 FL (ref 6–12)
PMV BLD AUTO: 10.5 FL (ref 6–12)
POTASSIUM SERPL-SCNC: 4.7 MMOL/L (ref 3.5–5.2)
PROTHROMBIN TIME: 16.1 SECONDS (ref 11.7–14.2)
PROTHROMBIN TIME: 18 SECONDS (ref 12.8–15.2)
QT INTERVAL: 477 MS
RBC # BLD AUTO: 2.78 10*6/MM3 (ref 4.14–5.8)
RBC # BLD AUTO: 2.82 10*6/MM3 (ref 4.14–5.8)
SODIUM SERPL-SCNC: 142 MMOL/L (ref 136–145)
TIBC SERPL-MCNC: 204 MCG/DL (ref 298–536)
TRANSFERRIN SERPL-MCNC: 137 MG/DL (ref 200–360)
UNIT  ABO: NORMAL
UNIT  RH: NORMAL
WBC NRBC COR # BLD: 10.41 10*3/MM3 (ref 3.4–10.8)
WBC NRBC COR # BLD: 11.26 10*3/MM3 (ref 3.4–10.8)

## 2022-11-03 PROCEDURE — 83540 ASSAY OF IRON: CPT | Performed by: NURSE PRACTITIONER

## 2022-11-03 PROCEDURE — 99222 1ST HOSP IP/OBS MODERATE 55: CPT | Performed by: INTERNAL MEDICINE

## 2022-11-03 PROCEDURE — 99024 POSTOP FOLLOW-UP VISIT: CPT | Performed by: THORACIC SURGERY (CARDIOTHORACIC VASCULAR SURGERY)

## 2022-11-03 PROCEDURE — 84466 ASSAY OF TRANSFERRIN: CPT | Performed by: NURSE PRACTITIONER

## 2022-11-03 PROCEDURE — 85025 COMPLETE CBC W/AUTO DIFF WBC: CPT | Performed by: NURSE PRACTITIONER

## 2022-11-03 PROCEDURE — 25010000002 MAGNESIUM SULFATE IN D5W 1G/100ML (PREMIX) 1-5 GM/100ML-% SOLUTION: Performed by: NURSE PRACTITIONER

## 2022-11-03 PROCEDURE — 0 POTASSIUM CHLORIDE PER 2 MEQ: Performed by: NURSE PRACTITIONER

## 2022-11-03 PROCEDURE — 80069 RENAL FUNCTION PANEL: CPT | Performed by: NURSE PRACTITIONER

## 2022-11-03 PROCEDURE — 93010 ELECTROCARDIOGRAM REPORT: CPT | Performed by: INTERNAL MEDICINE

## 2022-11-03 PROCEDURE — 85027 COMPLETE CBC AUTOMATED: CPT | Performed by: THORACIC SURGERY (CARDIOTHORACIC VASCULAR SURGERY)

## 2022-11-03 PROCEDURE — 25010000002 CALCIUM GLUCONATE-NACL 1-0.675 GM/50ML-% SOLUTION: Performed by: NURSE PRACTITIONER

## 2022-11-03 PROCEDURE — 71045 X-RAY EXAM CHEST 1 VIEW: CPT

## 2022-11-03 PROCEDURE — 82962 GLUCOSE BLOOD TEST: CPT

## 2022-11-03 PROCEDURE — 93005 ELECTROCARDIOGRAM TRACING: CPT | Performed by: NURSE PRACTITIONER

## 2022-11-03 PROCEDURE — 97162 PT EVAL MOD COMPLEX 30 MIN: CPT

## 2022-11-03 PROCEDURE — 83735 ASSAY OF MAGNESIUM: CPT | Performed by: NURSE PRACTITIONER

## 2022-11-03 PROCEDURE — 82330 ASSAY OF CALCIUM: CPT | Performed by: NURSE PRACTITIONER

## 2022-11-03 PROCEDURE — 85610 PROTHROMBIN TIME: CPT | Performed by: NURSE PRACTITIONER

## 2022-11-03 PROCEDURE — 25010000002 CEFAZOLIN IN DEXTROSE 2-4 GM/100ML-% SOLUTION: Performed by: NURSE PRACTITIONER

## 2022-11-03 RX ORDER — DEXTROSE MONOHYDRATE 25 G/50ML
25 INJECTION, SOLUTION INTRAVENOUS
Status: DISCONTINUED | OUTPATIENT
Start: 2022-11-03 | End: 2022-11-07

## 2022-11-03 RX ORDER — INSULIN LISPRO 100 [IU]/ML
0-7 INJECTION, SOLUTION INTRAVENOUS; SUBCUTANEOUS
Status: DISCONTINUED | OUTPATIENT
Start: 2022-11-03 | End: 2022-11-07

## 2022-11-03 RX ORDER — SODIUM BICARBONATE 650 MG/1
650 TABLET ORAL 2 TIMES DAILY
Status: DISCONTINUED | OUTPATIENT
Start: 2022-11-03 | End: 2022-11-04

## 2022-11-03 RX ORDER — NICOTINE POLACRILEX 4 MG
15 LOZENGE BUCCAL
Status: DISCONTINUED | OUTPATIENT
Start: 2022-11-03 | End: 2022-11-07

## 2022-11-03 RX ORDER — LIDOCAINE 50 MG/G
1 PATCH TOPICAL
Status: DISCONTINUED | OUTPATIENT
Start: 2022-11-03 | End: 2022-11-08 | Stop reason: HOSPADM

## 2022-11-03 RX ORDER — CALCIUM GLUCONATE 20 MG/ML
2 INJECTION, SOLUTION INTRAVENOUS ONCE
Status: COMPLETED | OUTPATIENT
Start: 2022-11-03 | End: 2022-11-03

## 2022-11-03 RX ADMIN — MAGNESIUM SULFATE IN DEXTROSE 1 G: 10 INJECTION, SOLUTION INTRAVENOUS at 02:07

## 2022-11-03 RX ADMIN — CEFAZOLIN SODIUM 2 G: 2 INJECTION, SOLUTION INTRAVENOUS at 17:33

## 2022-11-03 RX ADMIN — POTASSIUM CHLORIDE 20 MEQ: 29.8 INJECTION, SOLUTION INTRAVENOUS at 00:45

## 2022-11-03 RX ADMIN — CEFAZOLIN SODIUM 2 G: 2 INJECTION, SOLUTION INTRAVENOUS at 11:46

## 2022-11-03 RX ADMIN — HYDROCODONE BITARTRATE AND ACETAMINOPHEN 2 TABLET: 5; 325 TABLET ORAL at 20:06

## 2022-11-03 RX ADMIN — OXYCODONE HYDROCHLORIDE 10 MG: 5 TABLET ORAL at 01:19

## 2022-11-03 RX ADMIN — ACETAMINOPHEN 1000 MG: 10 INJECTION, SOLUTION INTRAVENOUS at 04:52

## 2022-11-03 RX ADMIN — PANTOPRAZOLE SODIUM 40 MG: 40 TABLET, DELAYED RELEASE ORAL at 06:18

## 2022-11-03 RX ADMIN — DOCUSATE SODIUM 50MG AND SENNOSIDES 8.6MG 2 TABLET: 8.6; 5 TABLET, FILM COATED ORAL at 20:08

## 2022-11-03 RX ADMIN — CHLORHEXIDINE GLUCONATE 15 ML: 1.2 SOLUTION ORAL at 17:33

## 2022-11-03 RX ADMIN — HYDROCODONE BITARTRATE AND ACETAMINOPHEN 2 TABLET: 5; 325 TABLET ORAL at 09:55

## 2022-11-03 RX ADMIN — LIDOCAINE 1 PATCH: 700 PATCH TOPICAL at 14:40

## 2022-11-03 RX ADMIN — MUPIROCIN 1 APPLICATION: 20 OINTMENT TOPICAL at 08:28

## 2022-11-03 RX ADMIN — CEFAZOLIN SODIUM 2 G: 2 INJECTION, SOLUTION INTRAVENOUS at 02:07

## 2022-11-03 RX ADMIN — CALCIUM GLUCONATE 2 G: 20 INJECTION, SOLUTION INTRAVENOUS at 07:50

## 2022-11-03 RX ADMIN — HYDROCODONE BITARTRATE AND ACETAMINOPHEN 2 TABLET: 5; 325 TABLET ORAL at 14:33

## 2022-11-03 RX ADMIN — MUPIROCIN 1 APPLICATION: 20 OINTMENT TOPICAL at 20:07

## 2022-11-03 RX ADMIN — ATORVASTATIN CALCIUM 40 MG: 20 TABLET, FILM COATED ORAL at 20:09

## 2022-11-03 RX ADMIN — CHLORHEXIDINE GLUCONATE 15 ML: 1.2 SOLUTION ORAL at 05:01

## 2022-11-04 ENCOUNTER — APPOINTMENT (OUTPATIENT)
Dept: GENERAL RADIOLOGY | Facility: HOSPITAL | Age: 82
End: 2022-11-04

## 2022-11-04 PROBLEM — I48.91 POSTOPERATIVE ATRIAL FIBRILLATION: Status: ACTIVE | Noted: 2022-11-04

## 2022-11-04 PROBLEM — I97.89 POSTOPERATIVE ATRIAL FIBRILLATION (HCC): Status: ACTIVE | Noted: 2022-11-04

## 2022-11-04 LAB
ANION GAP SERPL CALCULATED.3IONS-SCNC: 11 MMOL/L (ref 5–15)
BUN SERPL-MCNC: 35 MG/DL (ref 8–23)
BUN/CREAT SERPL: 18.9 (ref 7–25)
CALCIUM SPEC-SCNC: 8.1 MG/DL (ref 8.6–10.5)
CHLORIDE SERPL-SCNC: 103 MMOL/L (ref 98–107)
CO2 SERPL-SCNC: 23 MMOL/L (ref 22–29)
CREAT SERPL-MCNC: 1.85 MG/DL (ref 0.76–1.27)
DEPRECATED RDW RBC AUTO: 41.4 FL (ref 37–54)
EGFRCR SERPLBLD CKD-EPI 2021: 36.4 ML/MIN/1.73
ERYTHROCYTE [DISTWIDTH] IN BLOOD BY AUTOMATED COUNT: 13.5 % (ref 12.3–15.4)
GLUCOSE BLDC GLUCOMTR-MCNC: 103 MG/DL (ref 70–130)
GLUCOSE BLDC GLUCOMTR-MCNC: 105 MG/DL (ref 70–130)
GLUCOSE BLDC GLUCOMTR-MCNC: 123 MG/DL (ref 70–130)
GLUCOSE BLDC GLUCOMTR-MCNC: 125 MG/DL (ref 70–130)
GLUCOSE SERPL-MCNC: 108 MG/DL (ref 65–99)
HCT VFR BLD AUTO: 21.5 % (ref 37.5–51)
HGB BLD-MCNC: 7.3 G/DL (ref 13–17.7)
MCH RBC QN AUTO: 28.5 PG (ref 26.6–33)
MCHC RBC AUTO-ENTMCNC: 34 G/DL (ref 31.5–35.7)
MCV RBC AUTO: 84 FL (ref 79–97)
PLATELET # BLD AUTO: 64 10*3/MM3 (ref 140–450)
PMV BLD AUTO: 11.2 FL (ref 6–12)
POTASSIUM SERPL-SCNC: 4.2 MMOL/L (ref 3.5–5.2)
RBC # BLD AUTO: 2.56 10*6/MM3 (ref 4.14–5.8)
SODIUM SERPL-SCNC: 137 MMOL/L (ref 136–145)
WBC NRBC COR # BLD: 7.91 10*3/MM3 (ref 3.4–10.8)

## 2022-11-04 PROCEDURE — 25010000002 FUROSEMIDE PER 20 MG: Performed by: INTERNAL MEDICINE

## 2022-11-04 PROCEDURE — 97530 THERAPEUTIC ACTIVITIES: CPT

## 2022-11-04 PROCEDURE — 86900 BLOOD TYPING SEROLOGIC ABO: CPT

## 2022-11-04 PROCEDURE — 93005 ELECTROCARDIOGRAM TRACING: CPT | Performed by: THORACIC SURGERY (CARDIOTHORACIC VASCULAR SURGERY)

## 2022-11-04 PROCEDURE — 36430 TRANSFUSION BLD/BLD COMPNT: CPT

## 2022-11-04 PROCEDURE — 93010 ELECTROCARDIOGRAM REPORT: CPT | Performed by: INTERNAL MEDICINE

## 2022-11-04 PROCEDURE — 25010000002 CEFAZOLIN IN DEXTROSE 2-4 GM/100ML-% SOLUTION: Performed by: NURSE PRACTITIONER

## 2022-11-04 PROCEDURE — 71045 X-RAY EXAM CHEST 1 VIEW: CPT

## 2022-11-04 PROCEDURE — P9016 RBC LEUKOCYTES REDUCED: HCPCS

## 2022-11-04 PROCEDURE — 99024 POSTOP FOLLOW-UP VISIT: CPT | Performed by: THORACIC SURGERY (CARDIOTHORACIC VASCULAR SURGERY)

## 2022-11-04 PROCEDURE — 80048 BASIC METABOLIC PNL TOTAL CA: CPT | Performed by: THORACIC SURGERY (CARDIOTHORACIC VASCULAR SURGERY)

## 2022-11-04 PROCEDURE — 99232 SBSQ HOSP IP/OBS MODERATE 35: CPT | Performed by: NURSE PRACTITIONER

## 2022-11-04 PROCEDURE — 93005 ELECTROCARDIOGRAM TRACING: CPT | Performed by: NURSE PRACTITIONER

## 2022-11-04 PROCEDURE — 85027 COMPLETE CBC AUTOMATED: CPT | Performed by: THORACIC SURGERY (CARDIOTHORACIC VASCULAR SURGERY)

## 2022-11-04 PROCEDURE — 82962 GLUCOSE BLOOD TEST: CPT

## 2022-11-04 RX ORDER — GUAIFENESIN 600 MG/1
1200 TABLET, EXTENDED RELEASE ORAL EVERY 12 HOURS SCHEDULED
Status: DISCONTINUED | OUTPATIENT
Start: 2022-11-04 | End: 2022-11-08 | Stop reason: HOSPADM

## 2022-11-04 RX ORDER — IRON POLYSACCHARIDE COMPLEX 150 MG
150 CAPSULE ORAL DAILY
Status: DISCONTINUED | OUTPATIENT
Start: 2022-11-04 | End: 2022-11-08 | Stop reason: HOSPADM

## 2022-11-04 RX ORDER — FUROSEMIDE 10 MG/ML
40 INJECTION INTRAMUSCULAR; INTRAVENOUS ONCE
Status: COMPLETED | OUTPATIENT
Start: 2022-11-04 | End: 2022-11-04

## 2022-11-04 RX ADMIN — PANTOPRAZOLE SODIUM 40 MG: 40 TABLET, DELAYED RELEASE ORAL at 06:05

## 2022-11-04 RX ADMIN — ATORVASTATIN CALCIUM 40 MG: 20 TABLET, FILM COATED ORAL at 20:58

## 2022-11-04 RX ADMIN — CEFAZOLIN SODIUM 2 G: 2 INJECTION, SOLUTION INTRAVENOUS at 02:23

## 2022-11-04 RX ADMIN — CHLORHEXIDINE GLUCONATE 15 ML: 1.2 SOLUTION ORAL at 20:59

## 2022-11-04 RX ADMIN — CHLORHEXIDINE GLUCONATE 15 ML: 1.2 SOLUTION ORAL at 06:05

## 2022-11-04 RX ADMIN — GUAIFENESIN 1200 MG: 600 TABLET, EXTENDED RELEASE ORAL at 16:40

## 2022-11-04 RX ADMIN — Medication 150 MG: at 14:04

## 2022-11-04 RX ADMIN — LIDOCAINE 1 PATCH: 700 PATCH TOPICAL at 08:39

## 2022-11-04 RX ADMIN — FUROSEMIDE 40 MG: 10 INJECTION, SOLUTION INTRAMUSCULAR; INTRAVENOUS at 14:00

## 2022-11-04 RX ADMIN — METOPROLOL TARTRATE 12.5 MG: 25 TABLET, FILM COATED ORAL at 08:39

## 2022-11-04 RX ADMIN — SODIUM BICARBONATE 650 MG: 650 TABLET ORAL at 02:23

## 2022-11-04 RX ADMIN — MUPIROCIN 1 APPLICATION: 20 OINTMENT TOPICAL at 20:59

## 2022-11-04 RX ADMIN — ASPIRIN 81 MG: 81 TABLET, COATED ORAL at 08:38

## 2022-11-04 RX ADMIN — MUPIROCIN 1 APPLICATION: 20 OINTMENT TOPICAL at 08:38

## 2022-11-04 RX ADMIN — DOCUSATE SODIUM 50MG AND SENNOSIDES 8.6MG 2 TABLET: 8.6; 5 TABLET, FILM COATED ORAL at 20:58

## 2022-11-05 ENCOUNTER — APPOINTMENT (OUTPATIENT)
Dept: GENERAL RADIOLOGY | Facility: HOSPITAL | Age: 82
End: 2022-11-05

## 2022-11-05 LAB
ANION GAP SERPL CALCULATED.3IONS-SCNC: 7.4 MMOL/L (ref 5–15)
BH BB BLOOD EXPIRATION DATE: NORMAL
BH BB BLOOD EXPIRATION DATE: NORMAL
BH BB BLOOD TYPE BARCODE: 5100
BH BB BLOOD TYPE BARCODE: 5100
BH BB DISPENSE STATUS: NORMAL
BH BB DISPENSE STATUS: NORMAL
BH BB PRODUCT CODE: NORMAL
BH BB PRODUCT CODE: NORMAL
BH BB UNIT NUMBER: NORMAL
BH BB UNIT NUMBER: NORMAL
BUN SERPL-MCNC: 39 MG/DL (ref 8–23)
BUN/CREAT SERPL: 26.4 (ref 7–25)
CALCIUM SPEC-SCNC: 8.3 MG/DL (ref 8.6–10.5)
CHLORIDE SERPL-SCNC: 100 MMOL/L (ref 98–107)
CO2 SERPL-SCNC: 28.6 MMOL/L (ref 22–29)
CREAT SERPL-MCNC: 1.48 MG/DL (ref 0.76–1.27)
CROSSMATCH INTERPRETATION: NORMAL
CROSSMATCH INTERPRETATION: NORMAL
DEPRECATED RDW RBC AUTO: 46.8 FL (ref 37–54)
EGFRCR SERPLBLD CKD-EPI 2021: 47.5 ML/MIN/1.73
ERYTHROCYTE [DISTWIDTH] IN BLOOD BY AUTOMATED COUNT: 14.7 % (ref 12.3–15.4)
GLUCOSE BLDC GLUCOMTR-MCNC: 122 MG/DL (ref 70–130)
GLUCOSE BLDC GLUCOMTR-MCNC: 123 MG/DL (ref 70–130)
GLUCOSE BLDC GLUCOMTR-MCNC: 131 MG/DL (ref 70–130)
GLUCOSE SERPL-MCNC: 105 MG/DL (ref 65–99)
HCT VFR BLD AUTO: 25.3 % (ref 37.5–51)
HGB BLD-MCNC: 8.4 G/DL (ref 13–17.7)
MCH RBC QN AUTO: 28.8 PG (ref 26.6–33)
MCHC RBC AUTO-ENTMCNC: 33.2 G/DL (ref 31.5–35.7)
MCV RBC AUTO: 86.6 FL (ref 79–97)
PLATELET # BLD AUTO: 59 10*3/MM3 (ref 140–450)
PMV BLD AUTO: 10.7 FL (ref 6–12)
POTASSIUM SERPL-SCNC: 3.6 MMOL/L (ref 3.5–5.2)
QT INTERVAL: 364 MS
QT INTERVAL: 421 MS
QT INTERVAL: 463 MS
RBC # BLD AUTO: 2.92 10*6/MM3 (ref 4.14–5.8)
SODIUM SERPL-SCNC: 136 MMOL/L (ref 136–145)
UNIT  ABO: NORMAL
UNIT  ABO: NORMAL
UNIT  RH: NORMAL
UNIT  RH: NORMAL
WBC NRBC COR # BLD: 7.11 10*3/MM3 (ref 3.4–10.8)

## 2022-11-05 PROCEDURE — 99024 POSTOP FOLLOW-UP VISIT: CPT | Performed by: NURSE PRACTITIONER

## 2022-11-05 PROCEDURE — 80048 BASIC METABOLIC PNL TOTAL CA: CPT | Performed by: THORACIC SURGERY (CARDIOTHORACIC VASCULAR SURGERY)

## 2022-11-05 PROCEDURE — 93010 ELECTROCARDIOGRAM REPORT: CPT | Performed by: INTERNAL MEDICINE

## 2022-11-05 PROCEDURE — 25010000002 AMIODARONE IN DEXTROSE 5% 150-4.21 MG/100ML-% SOLUTION: Performed by: THORACIC SURGERY (CARDIOTHORACIC VASCULAR SURGERY)

## 2022-11-05 PROCEDURE — 92611 MOTION FLUOROSCOPY/SWALLOW: CPT

## 2022-11-05 PROCEDURE — 97110 THERAPEUTIC EXERCISES: CPT

## 2022-11-05 PROCEDURE — 74230 X-RAY XM SWLNG FUNCJ C+: CPT

## 2022-11-05 PROCEDURE — 82962 GLUCOSE BLOOD TEST: CPT

## 2022-11-05 PROCEDURE — 93005 ELECTROCARDIOGRAM TRACING: CPT | Performed by: THORACIC SURGERY (CARDIOTHORACIC VASCULAR SURGERY)

## 2022-11-05 PROCEDURE — 71045 X-RAY EXAM CHEST 1 VIEW: CPT

## 2022-11-05 PROCEDURE — 25010000002 AMIODARONE IN DEXTROSE 5% 360-4.14 MG/200ML-% SOLUTION: Performed by: THORACIC SURGERY (CARDIOTHORACIC VASCULAR SURGERY)

## 2022-11-05 PROCEDURE — 99222 1ST HOSP IP/OBS MODERATE 55: CPT | Performed by: INTERNAL MEDICINE

## 2022-11-05 PROCEDURE — 85027 COMPLETE CBC AUTOMATED: CPT | Performed by: THORACIC SURGERY (CARDIOTHORACIC VASCULAR SURGERY)

## 2022-11-05 PROCEDURE — 97530 THERAPEUTIC ACTIVITIES: CPT

## 2022-11-05 PROCEDURE — 99024 POSTOP FOLLOW-UP VISIT: CPT | Performed by: THORACIC SURGERY (CARDIOTHORACIC VASCULAR SURGERY)

## 2022-11-05 PROCEDURE — 99232 SBSQ HOSP IP/OBS MODERATE 35: CPT | Performed by: NURSE PRACTITIONER

## 2022-11-05 PROCEDURE — 71046 X-RAY EXAM CHEST 2 VIEWS: CPT

## 2022-11-05 RX ADMIN — HYDROCODONE BITARTRATE AND ACETAMINOPHEN 2 TABLET: 5; 325 TABLET ORAL at 15:14

## 2022-11-05 RX ADMIN — ATORVASTATIN CALCIUM 40 MG: 20 TABLET, FILM COATED ORAL at 20:27

## 2022-11-05 RX ADMIN — BARIUM SULFATE 1 TEASPOON(S): 0.6 CREAM ORAL at 12:21

## 2022-11-05 RX ADMIN — DOCUSATE SODIUM 50MG AND SENNOSIDES 8.6MG 2 TABLET: 8.6; 5 TABLET, FILM COATED ORAL at 20:27

## 2022-11-05 RX ADMIN — CHLORHEXIDINE GLUCONATE 15 ML: 1.2 SOLUTION ORAL at 05:39

## 2022-11-05 RX ADMIN — GUAIFENESIN 1200 MG: 600 TABLET, EXTENDED RELEASE ORAL at 09:57

## 2022-11-05 RX ADMIN — METOPROLOL TARTRATE 12.5 MG: 25 TABLET, FILM COATED ORAL at 09:57

## 2022-11-05 RX ADMIN — PANTOPRAZOLE SODIUM 40 MG: 40 TABLET, DELAYED RELEASE ORAL at 06:13

## 2022-11-05 RX ADMIN — BARIUM SULFATE 55 ML: 0.81 POWDER, FOR SUSPENSION ORAL at 12:21

## 2022-11-05 RX ADMIN — BARIUM SULFATE 135 ML: 980 POWDER, FOR SUSPENSION ORAL at 12:21

## 2022-11-05 RX ADMIN — Medication 150 MG: at 09:57

## 2022-11-05 RX ADMIN — LIDOCAINE 1 PATCH: 700 PATCH TOPICAL at 09:58

## 2022-11-05 RX ADMIN — MUPIROCIN 1 APPLICATION: 20 OINTMENT TOPICAL at 20:27

## 2022-11-05 RX ADMIN — MUPIROCIN 1 APPLICATION: 20 OINTMENT TOPICAL at 09:57

## 2022-11-05 RX ADMIN — AMIODARONE HYDROCHLORIDE 150 MG: 1.5 INJECTION, SOLUTION INTRAVENOUS at 00:01

## 2022-11-05 RX ADMIN — GUAIFENESIN 1200 MG: 600 TABLET, EXTENDED RELEASE ORAL at 20:27

## 2022-11-05 RX ADMIN — ASPIRIN 81 MG: 81 TABLET, COATED ORAL at 09:57

## 2022-11-05 RX ADMIN — AMIODARONE HYDROCHLORIDE 1 MG/MIN: 1.8 INJECTION, SOLUTION INTRAVENOUS at 00:29

## 2022-11-05 RX ADMIN — AMIODARONE HYDROCHLORIDE 0.5 MG/MIN: 1.8 INJECTION, SOLUTION INTRAVENOUS at 06:30

## 2022-11-05 RX ADMIN — AMIODARONE HYDROCHLORIDE 0.5 MG/MIN: 1.8 INJECTION, SOLUTION INTRAVENOUS at 18:13

## 2022-11-05 RX ADMIN — ACETAMINOPHEN 650 MG: 325 TABLET, FILM COATED ORAL at 04:34

## 2022-11-05 RX ADMIN — METOPROLOL TARTRATE 25 MG: 25 TABLET, FILM COATED ORAL at 20:27

## 2022-11-06 ENCOUNTER — APPOINTMENT (OUTPATIENT)
Dept: GENERAL RADIOLOGY | Facility: HOSPITAL | Age: 82
End: 2022-11-06

## 2022-11-06 PROBLEM — R13.19 ESOPHAGEAL DYSPHAGIA: Status: ACTIVE | Noted: 2022-07-06

## 2022-11-06 PROBLEM — R93.3 ABNORMAL ESOPHAGRAM: Status: ACTIVE | Noted: 2022-07-06

## 2022-11-06 LAB
ANION GAP SERPL CALCULATED.3IONS-SCNC: 10 MMOL/L (ref 5–15)
BILIRUB UR QL STRIP: NEGATIVE
BUN SERPL-MCNC: 32 MG/DL (ref 8–23)
BUN/CREAT SERPL: 22.7 (ref 7–25)
CALCIUM SPEC-SCNC: 7.9 MG/DL (ref 8.6–10.5)
CHLORIDE SERPL-SCNC: 98 MMOL/L (ref 98–107)
CLARITY UR: CLEAR
CO2 SERPL-SCNC: 27 MMOL/L (ref 22–29)
COLOR UR: YELLOW
CREAT SERPL-MCNC: 1.41 MG/DL (ref 0.76–1.27)
DEPRECATED RDW RBC AUTO: 47.3 FL (ref 37–54)
EGFRCR SERPLBLD CKD-EPI 2021: 50.4 ML/MIN/1.73
ERYTHROCYTE [DISTWIDTH] IN BLOOD BY AUTOMATED COUNT: 14.7 % (ref 12.3–15.4)
GLUCOSE BLDC GLUCOMTR-MCNC: 106 MG/DL (ref 70–130)
GLUCOSE BLDC GLUCOMTR-MCNC: 124 MG/DL (ref 70–130)
GLUCOSE BLDC GLUCOMTR-MCNC: 127 MG/DL (ref 70–130)
GLUCOSE SERPL-MCNC: 120 MG/DL (ref 65–99)
GLUCOSE UR STRIP-MCNC: NEGATIVE MG/DL
HCT VFR BLD AUTO: 26.5 % (ref 37.5–51)
HGB BLD-MCNC: 8.7 G/DL (ref 13–17.7)
HGB UR QL STRIP.AUTO: ABNORMAL
KETONES UR QL STRIP: NEGATIVE
LEUKOCYTE ESTERASE UR QL STRIP.AUTO: NEGATIVE
MCH RBC QN AUTO: 28.2 PG (ref 26.6–33)
MCHC RBC AUTO-ENTMCNC: 32.8 G/DL (ref 31.5–35.7)
MCV RBC AUTO: 86 FL (ref 79–97)
NITRITE UR QL STRIP: NEGATIVE
PH UR STRIP.AUTO: 5.5 [PH] (ref 5–8)
PLATELET # BLD AUTO: 68 10*3/MM3 (ref 140–450)
PMV BLD AUTO: 10.2 FL (ref 6–12)
POTASSIUM SERPL-SCNC: 3.4 MMOL/L (ref 3.5–5.2)
PROT UR QL STRIP: ABNORMAL
QT INTERVAL: 545 MS
RBC # BLD AUTO: 3.08 10*6/MM3 (ref 4.14–5.8)
SODIUM SERPL-SCNC: 135 MMOL/L (ref 136–145)
SODIUM UR-SCNC: 47 MMOL/L
SP GR UR STRIP: 1.02 (ref 1–1.03)
UROBILINOGEN UR QL STRIP: ABNORMAL
WBC NRBC COR # BLD: 7.92 10*3/MM3 (ref 3.4–10.8)

## 2022-11-06 PROCEDURE — 97530 THERAPEUTIC ACTIVITIES: CPT

## 2022-11-06 PROCEDURE — 80048 BASIC METABOLIC PNL TOTAL CA: CPT | Performed by: THORACIC SURGERY (CARDIOTHORACIC VASCULAR SURGERY)

## 2022-11-06 PROCEDURE — 93010 ELECTROCARDIOGRAM REPORT: CPT | Performed by: INTERNAL MEDICINE

## 2022-11-06 PROCEDURE — 99232 SBSQ HOSP IP/OBS MODERATE 35: CPT | Performed by: INTERNAL MEDICINE

## 2022-11-06 PROCEDURE — 84300 ASSAY OF URINE SODIUM: CPT | Performed by: INTERNAL MEDICINE

## 2022-11-06 PROCEDURE — 82962 GLUCOSE BLOOD TEST: CPT

## 2022-11-06 PROCEDURE — 93005 ELECTROCARDIOGRAM TRACING: CPT | Performed by: NURSE PRACTITIONER

## 2022-11-06 PROCEDURE — 99024 POSTOP FOLLOW-UP VISIT: CPT | Performed by: THORACIC SURGERY (CARDIOTHORACIC VASCULAR SURGERY)

## 2022-11-06 PROCEDURE — 99232 SBSQ HOSP IP/OBS MODERATE 35: CPT | Performed by: NURSE PRACTITIONER

## 2022-11-06 PROCEDURE — 74220 X-RAY XM ESOPHAGUS 1CNTRST: CPT

## 2022-11-06 PROCEDURE — 99024 POSTOP FOLLOW-UP VISIT: CPT | Performed by: NURSE PRACTITIONER

## 2022-11-06 PROCEDURE — 85027 COMPLETE CBC AUTOMATED: CPT | Performed by: THORACIC SURGERY (CARDIOTHORACIC VASCULAR SURGERY)

## 2022-11-06 PROCEDURE — 81003 URINALYSIS AUTO W/O SCOPE: CPT | Performed by: INTERNAL MEDICINE

## 2022-11-06 RX ORDER — AMIODARONE HYDROCHLORIDE 200 MG/1
400 TABLET ORAL EVERY 12 HOURS SCHEDULED
Status: DISCONTINUED | OUTPATIENT
Start: 2022-11-06 | End: 2022-11-06

## 2022-11-06 RX ORDER — AMIODARONE HYDROCHLORIDE 200 MG/1
100 TABLET ORAL EVERY 12 HOURS SCHEDULED
Status: DISCONTINUED | OUTPATIENT
Start: 2022-11-06 | End: 2022-11-08

## 2022-11-06 RX ORDER — AMIODARONE HYDROCHLORIDE 200 MG/1
100 TABLET ORAL EVERY 12 HOURS SCHEDULED
Status: DISCONTINUED | OUTPATIENT
Start: 2022-11-06 | End: 2022-11-06

## 2022-11-06 RX ADMIN — LIDOCAINE 1 PATCH: 700 PATCH TOPICAL at 08:20

## 2022-11-06 RX ADMIN — HYDROCODONE BITARTRATE AND ACETAMINOPHEN 2 TABLET: 5; 325 TABLET ORAL at 18:11

## 2022-11-06 RX ADMIN — ATORVASTATIN CALCIUM 40 MG: 20 TABLET, FILM COATED ORAL at 20:07

## 2022-11-06 RX ADMIN — POTASSIUM CHLORIDE 40 MEQ: 750 TABLET, EXTENDED RELEASE ORAL at 12:04

## 2022-11-06 RX ADMIN — Medication 150 MG: at 08:20

## 2022-11-06 RX ADMIN — BARIUM SULFATE 183 ML: 960 POWDER, FOR SUSPENSION ORAL at 11:00

## 2022-11-06 RX ADMIN — MUPIROCIN 1 APPLICATION: 20 OINTMENT TOPICAL at 08:21

## 2022-11-06 RX ADMIN — ASPIRIN 81 MG: 81 TABLET, COATED ORAL at 08:20

## 2022-11-06 RX ADMIN — MUPIROCIN 1 APPLICATION: 20 OINTMENT TOPICAL at 20:07

## 2022-11-06 RX ADMIN — HYDROCODONE BITARTRATE AND ACETAMINOPHEN 2 TABLET: 5; 325 TABLET ORAL at 04:09

## 2022-11-06 RX ADMIN — AMIODARONE HYDROCHLORIDE 100 MG: 200 TABLET ORAL at 20:07

## 2022-11-06 RX ADMIN — GUAIFENESIN 1200 MG: 600 TABLET, EXTENDED RELEASE ORAL at 20:07

## 2022-11-06 RX ADMIN — PANTOPRAZOLE SODIUM 40 MG: 40 TABLET, DELAYED RELEASE ORAL at 05:42

## 2022-11-06 RX ADMIN — POTASSIUM CHLORIDE 40 MEQ: 750 TABLET, EXTENDED RELEASE ORAL at 08:21

## 2022-11-06 RX ADMIN — GUAIFENESIN 1200 MG: 600 TABLET, EXTENDED RELEASE ORAL at 08:20

## 2022-11-06 RX ADMIN — AMIODARONE HYDROCHLORIDE 100 MG: 200 TABLET ORAL at 12:04

## 2022-11-06 RX ADMIN — METOPROLOL TARTRATE 25 MG: 25 TABLET, FILM COATED ORAL at 08:20

## 2022-11-06 RX ADMIN — METOPROLOL TARTRATE 25 MG: 25 TABLET, FILM COATED ORAL at 20:07

## 2022-11-06 RX ADMIN — DOCUSATE SODIUM 50MG AND SENNOSIDES 8.6MG 2 TABLET: 8.6; 5 TABLET, FILM COATED ORAL at 20:07

## 2022-11-07 ENCOUNTER — ANESTHESIA (OUTPATIENT)
Dept: GASTROENTEROLOGY | Facility: HOSPITAL | Age: 82
End: 2022-11-07

## 2022-11-07 ENCOUNTER — ANESTHESIA EVENT (OUTPATIENT)
Dept: GASTROENTEROLOGY | Facility: HOSPITAL | Age: 82
End: 2022-11-07

## 2022-11-07 LAB
ANION GAP SERPL CALCULATED.3IONS-SCNC: 9.7 MMOL/L (ref 5–15)
BUN SERPL-MCNC: 29 MG/DL (ref 8–23)
BUN/CREAT SERPL: 21.8 (ref 7–25)
CALCIUM SPEC-SCNC: 7.9 MG/DL (ref 8.6–10.5)
CHLORIDE SERPL-SCNC: 99 MMOL/L (ref 98–107)
CO2 SERPL-SCNC: 25.3 MMOL/L (ref 22–29)
CREAT SERPL-MCNC: 1.33 MG/DL (ref 0.76–1.27)
EGFRCR SERPLBLD CKD-EPI 2021: 54 ML/MIN/1.73
GLUCOSE BLDC GLUCOMTR-MCNC: 101 MG/DL (ref 70–130)
GLUCOSE BLDC GLUCOMTR-MCNC: 105 MG/DL (ref 70–130)
GLUCOSE SERPL-MCNC: 96 MG/DL (ref 65–99)
MAGNESIUM SERPL-MCNC: 2.3 MG/DL (ref 1.6–2.4)
POTASSIUM SERPL-SCNC: 4 MMOL/L (ref 3.5–5.2)
QT INTERVAL: 498 MS
SODIUM SERPL-SCNC: 134 MMOL/L (ref 136–145)

## 2022-11-07 PROCEDURE — 0DB48ZX EXCISION OF ESOPHAGOGASTRIC JUNCTION, VIA NATURAL OR ARTIFICIAL OPENING ENDOSCOPIC, DIAGNOSTIC: ICD-10-PCS | Performed by: INTERNAL MEDICINE

## 2022-11-07 PROCEDURE — 99024 POSTOP FOLLOW-UP VISIT: CPT | Performed by: THORACIC SURGERY (CARDIOTHORACIC VASCULAR SURGERY)

## 2022-11-07 PROCEDURE — 93005 ELECTROCARDIOGRAM TRACING: CPT | Performed by: NURSE PRACTITIONER

## 2022-11-07 PROCEDURE — 80048 BASIC METABOLIC PNL TOTAL CA: CPT | Performed by: THORACIC SURGERY (CARDIOTHORACIC VASCULAR SURGERY)

## 2022-11-07 PROCEDURE — 0DB68ZX EXCISION OF STOMACH, VIA NATURAL OR ARTIFICIAL OPENING ENDOSCOPIC, DIAGNOSTIC: ICD-10-PCS | Performed by: INTERNAL MEDICINE

## 2022-11-07 PROCEDURE — 82962 GLUCOSE BLOOD TEST: CPT

## 2022-11-07 PROCEDURE — 43239 EGD BIOPSY SINGLE/MULTIPLE: CPT | Performed by: INTERNAL MEDICINE

## 2022-11-07 PROCEDURE — 93010 ELECTROCARDIOGRAM REPORT: CPT | Performed by: INTERNAL MEDICINE

## 2022-11-07 PROCEDURE — 88313 SPECIAL STAINS GROUP 2: CPT | Performed by: INTERNAL MEDICINE

## 2022-11-07 PROCEDURE — 83735 ASSAY OF MAGNESIUM: CPT | Performed by: INTERNAL MEDICINE

## 2022-11-07 PROCEDURE — 97530 THERAPEUTIC ACTIVITIES: CPT

## 2022-11-07 PROCEDURE — 25010000002 PROPOFOL 10 MG/ML EMULSION: Performed by: ANESTHESIOLOGY

## 2022-11-07 PROCEDURE — 88305 TISSUE EXAM BY PATHOLOGIST: CPT | Performed by: INTERNAL MEDICINE

## 2022-11-07 PROCEDURE — 99232 SBSQ HOSP IP/OBS MODERATE 35: CPT | Performed by: NURSE PRACTITIONER

## 2022-11-07 RX ORDER — PROMETHAZINE HYDROCHLORIDE 25 MG/1
25 SUPPOSITORY RECTAL ONCE AS NEEDED
Status: DISCONTINUED | OUTPATIENT
Start: 2022-11-07 | End: 2022-11-07 | Stop reason: HOSPADM

## 2022-11-07 RX ORDER — PROPOFOL 10 MG/ML
VIAL (ML) INTRAVENOUS CONTINUOUS PRN
Status: DISCONTINUED | OUTPATIENT
Start: 2022-11-07 | End: 2022-11-07 | Stop reason: SURG

## 2022-11-07 RX ORDER — FINASTERIDE 5 MG/1
5 TABLET, FILM COATED ORAL DAILY
Status: DISCONTINUED | OUTPATIENT
Start: 2022-11-07 | End: 2022-11-08 | Stop reason: HOSPADM

## 2022-11-07 RX ORDER — DICYCLOMINE HYDROCHLORIDE 10 MG/1
10 CAPSULE ORAL
Status: DISCONTINUED | OUTPATIENT
Start: 2022-11-07 | End: 2022-11-08 | Stop reason: HOSPADM

## 2022-11-07 RX ORDER — SODIUM CHLORIDE 9 MG/ML
INJECTION, SOLUTION INTRAVENOUS CONTINUOUS PRN
Status: DISCONTINUED | OUTPATIENT
Start: 2022-11-07 | End: 2022-11-07 | Stop reason: SURG

## 2022-11-07 RX ORDER — SODIUM CHLORIDE 9 MG/ML
30 INJECTION, SOLUTION INTRAVENOUS CONTINUOUS PRN
Status: DISCONTINUED | OUTPATIENT
Start: 2022-11-07 | End: 2022-11-08 | Stop reason: HOSPADM

## 2022-11-07 RX ORDER — LIDOCAINE HYDROCHLORIDE 20 MG/ML
INJECTION, SOLUTION INFILTRATION; PERINEURAL AS NEEDED
Status: DISCONTINUED | OUTPATIENT
Start: 2022-11-07 | End: 2022-11-07 | Stop reason: SURG

## 2022-11-07 RX ORDER — PROMETHAZINE HYDROCHLORIDE 25 MG/1
25 TABLET ORAL ONCE AS NEEDED
Status: DISCONTINUED | OUTPATIENT
Start: 2022-11-07 | End: 2022-11-07 | Stop reason: HOSPADM

## 2022-11-07 RX ORDER — LISINOPRIL 2.5 MG/1
2.5 TABLET ORAL
Status: DISCONTINUED | OUTPATIENT
Start: 2022-11-07 | End: 2022-11-08

## 2022-11-07 RX ADMIN — DOCUSATE SODIUM 50MG AND SENNOSIDES 8.6MG 2 TABLET: 8.6; 5 TABLET, FILM COATED ORAL at 20:08

## 2022-11-07 RX ADMIN — MUPIROCIN 1 APPLICATION: 20 OINTMENT TOPICAL at 08:55

## 2022-11-07 RX ADMIN — GUAIFENESIN 1200 MG: 600 TABLET, EXTENDED RELEASE ORAL at 08:55

## 2022-11-07 RX ADMIN — GUAIFENESIN 1200 MG: 600 TABLET, EXTENDED RELEASE ORAL at 20:08

## 2022-11-07 RX ADMIN — SODIUM CHLORIDE: 9 INJECTION, SOLUTION INTRAVENOUS at 15:22

## 2022-11-07 RX ADMIN — AMIODARONE HYDROCHLORIDE 100 MG: 200 TABLET ORAL at 20:08

## 2022-11-07 RX ADMIN — ATORVASTATIN CALCIUM 40 MG: 20 TABLET, FILM COATED ORAL at 20:08

## 2022-11-07 RX ADMIN — METOPROLOL TARTRATE 25 MG: 25 TABLET, FILM COATED ORAL at 08:55

## 2022-11-07 RX ADMIN — PANTOPRAZOLE SODIUM 40 MG: 40 TABLET, DELAYED RELEASE ORAL at 06:09

## 2022-11-07 RX ADMIN — FINASTERIDE 5 MG: 5 TABLET, FILM COATED ORAL at 09:48

## 2022-11-07 RX ADMIN — HYDROCODONE BITARTRATE AND ACETAMINOPHEN 2 TABLET: 5; 325 TABLET ORAL at 22:01

## 2022-11-07 RX ADMIN — LIDOCAINE HYDROCHLORIDE 60 MG: 20 INJECTION, SOLUTION INFILTRATION; PERINEURAL at 15:26

## 2022-11-07 RX ADMIN — SODIUM CHLORIDE 30 ML/HR: 9 INJECTION, SOLUTION INTRAVENOUS at 14:25

## 2022-11-07 RX ADMIN — MUPIROCIN 1 APPLICATION: 20 OINTMENT TOPICAL at 20:09

## 2022-11-07 RX ADMIN — AMIODARONE HYDROCHLORIDE 100 MG: 200 TABLET ORAL at 08:55

## 2022-11-07 RX ADMIN — HYDROCODONE BITARTRATE AND ACETAMINOPHEN 2 TABLET: 5; 325 TABLET ORAL at 06:11

## 2022-11-07 RX ADMIN — DICYCLOMINE HYDROCHLORIDE 10 MG: 10 CAPSULE ORAL at 18:51

## 2022-11-07 RX ADMIN — ASPIRIN 81 MG: 81 TABLET, COATED ORAL at 08:55

## 2022-11-07 RX ADMIN — PROPOFOL 200 MCG/KG/MIN: 10 INJECTION, EMULSION INTRAVENOUS at 15:27

## 2022-11-07 RX ADMIN — METOPROLOL TARTRATE 25 MG: 25 TABLET, FILM COATED ORAL at 20:09

## 2022-11-07 RX ADMIN — Medication 150 MG: at 08:55

## 2022-11-07 RX ADMIN — LISINOPRIL 2.5 MG: 2.5 TABLET ORAL at 16:53

## 2022-11-07 NOTE — ANESTHESIA POSTPROCEDURE EVALUATION
Patient: Imer Jordan    Procedure Summary     Date: 11/07/22 Room / Location:  [facility]    Anesthesia Start: 1522 Anesthesia Stop: 1546    Procedure: ESOPHAGOGASTRODUODENOSCOPY with biopsies (Esophagus) Diagnosis:       Abnormal esophagram      Esophageal dysphagia      (Abnormal esophagram [R93.3])      (Esophageal dysphagia [R13.19])    Surgeons: Sobeida Rios MD Provider: Ben Long MD    Anesthesia Type: MAC ASA Status: 3          Anesthesia Type: MAC    Vitals  Vitals Value Taken Time   /68 11/07/22 1554   Temp 37.1 °C (98.7 °F) 11/07/22 1554   Pulse 65 11/07/22 1554   Resp 16 11/07/22 1554   SpO2 94 % 11/07/22 1554           Post Anesthesia Care and Evaluation    Patient location during evaluation: bedside  Pain management: adequate    Airway patency: patent  Anesthetic complications: No anesthetic complications    Cardiovascular status: acceptable  Respiratory status: acceptable  Hydration status: acceptable

## 2022-11-07 NOTE — ANESTHESIA PREPROCEDURE EVALUATION
Anesthesia Evaluation                  Airway   Mallampati: III  TM distance: >3 FB  Neck ROM: full  No difficulty expected  Dental - normal exam     Pulmonary - negative pulmonary ROS and normal exam   Cardiovascular     (+) hypertension, dysrhythmias Atrial Fib, systolic click, hyperlipidemia,     ROS comment: S/P AAA in 2018 at 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        CODE STATUS:    Code Status (Patient has no pulse and is not breathing): CPR (Attempt to Resuscitate)  Medical Interventions (Patient has pulse or is breathing): Full Support  Release to patient: Routine Release

## 2022-11-08 ENCOUNTER — HOME HEALTH ADMISSION (OUTPATIENT)
Dept: HOME HEALTH SERVICES | Facility: HOME HEALTHCARE | Age: 82
End: 2022-11-08

## 2022-11-08 ENCOUNTER — READMISSION MANAGEMENT (OUTPATIENT)
Dept: CALL CENTER | Facility: HOSPITAL | Age: 82
End: 2022-11-08

## 2022-11-08 ENCOUNTER — PREP FOR SURGERY (OUTPATIENT)
Dept: OTHER | Facility: HOSPITAL | Age: 82
End: 2022-11-08

## 2022-11-08 VITALS
WEIGHT: 178.7 LBS | HEART RATE: 60 BPM | RESPIRATION RATE: 16 BRPM | SYSTOLIC BLOOD PRESSURE: 119 MMHG | TEMPERATURE: 98.7 F | OXYGEN SATURATION: 95 % | DIASTOLIC BLOOD PRESSURE: 68 MMHG | BODY MASS INDEX: 26.39 KG/M2

## 2022-11-08 DIAGNOSIS — R13.19 ESOPHAGEAL DYSPHAGIA: Primary | ICD-10-CM

## 2022-11-08 LAB
ALBUMIN SERPL-MCNC: 3 G/DL (ref 3.5–5.2)
ANION GAP SERPL CALCULATED.3IONS-SCNC: 7.7 MMOL/L (ref 5–15)
BUN SERPL-MCNC: 29 MG/DL (ref 8–23)
BUN/CREAT SERPL: 24 (ref 7–25)
CALCIUM SPEC-SCNC: 7.9 MG/DL (ref 8.6–10.5)
CHLORIDE SERPL-SCNC: 100 MMOL/L (ref 98–107)
CO2 SERPL-SCNC: 25.3 MMOL/L (ref 22–29)
CREAT SERPL-MCNC: 1.21 MG/DL (ref 0.76–1.27)
EGFRCR SERPLBLD CKD-EPI 2021: 60.5 ML/MIN/1.73
GLUCOSE SERPL-MCNC: 97 MG/DL (ref 65–99)
PHOSPHATE SERPL-MCNC: 2.5 MG/DL (ref 2.5–4.5)
POTASSIUM SERPL-SCNC: 3.9 MMOL/L (ref 3.5–5.2)
SODIUM SERPL-SCNC: 133 MMOL/L (ref 136–145)

## 2022-11-08 PROCEDURE — 99232 SBSQ HOSP IP/OBS MODERATE 35: CPT | Performed by: INTERNAL MEDICINE

## 2022-11-08 PROCEDURE — 99024 POSTOP FOLLOW-UP VISIT: CPT | Performed by: THORACIC SURGERY (CARDIOTHORACIC VASCULAR SURGERY)

## 2022-11-08 PROCEDURE — 80069 RENAL FUNCTION PANEL: CPT | Performed by: INTERNAL MEDICINE

## 2022-11-08 RX ORDER — HYDROCODONE BITARTRATE AND ACETAMINOPHEN 5; 325 MG/1; MG/1
1 TABLET ORAL EVERY 4 HOURS PRN
Qty: 42 TABLET | Refills: 0 | Status: SHIPPED | OUTPATIENT
Start: 2022-11-08 | End: 2022-11-15

## 2022-11-08 RX ORDER — LISINOPRIL 10 MG/1
10 TABLET ORAL
Status: DISCONTINUED | OUTPATIENT
Start: 2022-11-08 | End: 2022-11-08 | Stop reason: HOSPADM

## 2022-11-08 RX ORDER — DICYCLOMINE HYDROCHLORIDE 10 MG/1
10 CAPSULE ORAL
Qty: 90 CAPSULE | Refills: 2 | Status: SHIPPED | OUTPATIENT
Start: 2022-11-08 | End: 2023-02-06

## 2022-11-08 RX ORDER — LISINOPRIL 10 MG/1
10 TABLET ORAL
Qty: 30 TABLET | Refills: 2 | Status: SHIPPED | OUTPATIENT
Start: 2022-11-08 | End: 2022-12-09 | Stop reason: SDUPTHER

## 2022-11-08 RX ORDER — AMLODIPINE BESYLATE 5 MG/1
5 TABLET ORAL
Status: DISCONTINUED | OUTPATIENT
Start: 2022-11-08 | End: 2022-11-08

## 2022-11-08 RX ADMIN — Medication 150 MG: at 08:06

## 2022-11-08 RX ADMIN — ASPIRIN 81 MG: 81 TABLET, COATED ORAL at 08:06

## 2022-11-08 RX ADMIN — LISINOPRIL 2.5 MG: 2.5 TABLET ORAL at 08:06

## 2022-11-08 RX ADMIN — DICYCLOMINE HYDROCHLORIDE 10 MG: 10 CAPSULE ORAL at 08:06

## 2022-11-08 RX ADMIN — HYDROCODONE BITARTRATE AND ACETAMINOPHEN 2 TABLET: 5; 325 TABLET ORAL at 06:04

## 2022-11-08 RX ADMIN — AMIODARONE HYDROCHLORIDE 100 MG: 200 TABLET ORAL at 08:07

## 2022-11-08 RX ADMIN — LISINOPRIL 10 MG: 10 TABLET ORAL at 10:13

## 2022-11-08 RX ADMIN — MUPIROCIN 1 APPLICATION: 20 OINTMENT TOPICAL at 08:07

## 2022-11-08 RX ADMIN — GUAIFENESIN 1200 MG: 600 TABLET, EXTENDED RELEASE ORAL at 08:06

## 2022-11-08 RX ADMIN — FINASTERIDE 5 MG: 5 TABLET, FILM COATED ORAL at 08:06

## 2022-11-08 RX ADMIN — METOPROLOL TARTRATE 25 MG: 25 TABLET, FILM COATED ORAL at 08:07

## 2022-11-08 RX ADMIN — DICYCLOMINE HYDROCHLORIDE 10 MG: 10 CAPSULE ORAL at 12:00

## 2022-11-08 RX ADMIN — PANTOPRAZOLE SODIUM 40 MG: 40 TABLET, DELAYED RELEASE ORAL at 06:00

## 2022-11-09 ENCOUNTER — HOME CARE VISIT (OUTPATIENT)
Dept: HOME HEALTH SERVICES | Facility: HOME HEALTHCARE | Age: 82
End: 2022-11-09

## 2022-11-09 ENCOUNTER — TRANSITIONAL CARE MANAGEMENT TELEPHONE ENCOUNTER (OUTPATIENT)
Dept: CALL CENTER | Facility: HOSPITAL | Age: 82
End: 2022-11-09

## 2022-11-09 PROCEDURE — G0299 HHS/HOSPICE OF RN EA 15 MIN: HCPCS

## 2022-11-09 NOTE — OUTREACH NOTE
Prep Survey    Flowsheet Row Responses   List of hospitals in [place] patient discharged from? [place]   Is LACE score < 7 ? No   Emergency Room discharge w/ pulse ox? No   Eligibility [facility]   Date of Admission 11/02/22   Date of Discharge 11/08/22   Discharge Disposition Home or Self Care   Discharge diagnosis Ascending aortic aneurysm, root aneurysm, AV regurgitation- s/p AVR (tissue), ascending aneurysm repair, root reduction   Does the patient have one of the following disease processes/diagnoses(primary or secondary)? Cardiothoracic surgery   Does the patient have Home health ordered? Yes   What is the Home health agency?  [facility]   Is there a DME ordered? No   Prep survey completed? Yes          LIN INIGUEZ - Registered Nurse

## 2022-11-09 NOTE — OUTREACH NOTE
Call Center TCM Note    Flowsheet Row Responses   [facility] patient discharged from? [place]   Does the patient have one of the following disease processes/diagnoses(primary or secondary)? Cardiothoracic surgery   TCM attempt successful? Yes   Call start time 1344   Call end time 1346   Discharge diagnosis Ascending aortic aneurysm, root aneurysm, AV regurgitation- s/p AVR (tissue), ascending aneurysm repair, root reduction   Meds reviewed with patient/caregiver? Yes   Is the patient having any side effects they believe may be caused by any medication additions or changes? No   Does the patient have all medications related to this admission filled (includes all antibiotics, pain medications, cardiac medications, etc.) Yes   Is the patient taking all medications as directed (includes completed medication regime)? Yes   Comments HOSP DC FU appt 11/11/22 @ 3:15 pm.    Does the patient have an appointment with their PCP within 7 days of discharge? Yes   What is the Home health agency?  [facility]   Has home health visited the patient within 72 hours of discharge? Yes   Psychosocial issues? No   Did the patient receive a copy of their discharge instructions? Yes   Nursing interventions Reviewed instructions with patient   What is the patient's perception of their health status since discharge? Improving   Nursing interventions Nurse provided patient education   Is the patient /caregiver able to teach back basic post-op care? Take showers only when approved by MD-sponge bathe until then, No tub bath, swimming, or hot tub until instructed by MD, Lifting as instructed by MD in discharge instructions, Drive as instructed by MD in discharge instructions   Is the patient/caregiver able to teach back signs and symptoms of incisional infection? Incisional warmth, Increased drainage or bleeding, Increased redness, swelling or pain at the incisonal site, Pus or odor from incision, Fever   Is the patient/caregiver able to  teach back steps to recovery at home? Eat a well-balance diet, Rest and rebuild strength, gradually increase activity, Set small, achievable goals for return to baseline health   Is the patient/caregiver able to teach back the hierarchy of who to call/visit for symptoms/problems? PCP, Specialist, Home health nurse, Urgent Care, ED, 911 Yes   TCM call completed? Yes   Wrap up additional comments Pt reports he is doing ok. no needs   Call end time 0386          Lara Armenta RN    11/9/2022, 13:47 EST

## 2022-11-10 ENCOUNTER — NURSE TRIAGE (OUTPATIENT)
Dept: CALL CENTER | Facility: HOSPITAL | Age: 82
End: 2022-11-10

## 2022-11-10 LAB
LAB AP CASE REPORT: NORMAL
LAB AP DIAGNOSIS COMMENT: NORMAL
PATH REPORT.FINAL DX SPEC: NORMAL
PATH REPORT.GROSS SPEC: NORMAL

## 2022-11-10 NOTE — TELEPHONE ENCOUNTER
Caller reports was discharged from hospital 2 days ago and thinks he had reaction to meds he had taken last night. Reports after taking Lisinopril, Bentyl and ferrous sulfate last night he became disoriented and slightly confused. States he attempted to call HH but did not receive answer. Reviewed patient discharge med list in [facility] and on AVS. While reviewing caller states maybe it was not Lisinopril but Metoprolol he took. Caller denies having med planner box. BP this a.m. 174/87, HR 72. Denies any disorientation or confusion. Reviewed list of meds patient needs to take this a.m. This RN also called and spoke with Andrew at [facility] regarding patient issues. HH visit scheduled for tomorrow but states will contact his  and possbly move up visit to today.. Patient is aware.    Reason for Disposition  • Caller has medicine question only, adult not sick, and triager answers question    Additional Information  • Negative: Intentional drug overdose and suicidal thoughts or ideas  • Negative: Drug overdose and triager unable to answer question  • Negative: Caller requesting a renewal or refill of a medicine patient is currently taking  • Negative: Caller requesting information unrelated to medicine  • Negative: Caller requesting information about COVID-19 Vaccine  • Negative: Caller requesting information about Emergency Contraception  • Negative: Caller requesting information about Combined Birth Control Pills  • Negative: Caller requesting information about Progestin Birth Control Pills  • Negative: Caller requesting information about Post-Op pain or medicines  • Negative: Caller requesting a prescription antibiotic (such as penicillin) for Strep throat and has a positive culture result  • Negative: Caller requesting a prescription anti-viral med (such as Tamiflu) and has influenza (flu) symptoms  • Negative: Immunization reaction suspected  • Negative: Rash while taking a medicine or within 3 days  of stopping it  • Negative: Asthma and having symptoms of asthma (cough, wheezing, etc.)  • Negative: Symptom of illness (e.g., headache, abdominal pain, earache, vomiting) that are more than mild  • Negative: Breastfeeding questions about mother's medicines and diet  • Negative: MORE THAN A DOUBLE DOSE of a prescription or over-the-counter (OTC) drug  • Negative: DOUBLE DOSE (an extra dose or lesser amount) of prescription drug and any symptoms (e.g., dizziness, nausea, pain, sleepiness)  • Negative: DOUBLE DOSE (an extra dose or lesser amount) of over-the-counter (OTC) drug and any symptoms (e.g., dizziness, nausea, pain, sleepiness)  • Negative: Took another person's prescription drug  • Negative: DOUBLE DOSE (an extra dose or lesser amount) of prescription drug and NO symptoms  (Exception: A double dose of antibiotics.)  • Negative: Diabetes drug error or overdose (e.g., took wrong type of insulin or took extra dose)  • Negative: Caller has medication question about med NOT prescribed by PCP and triager unable to answer question (e.g., compatibility with other med, storage)  • Negative: Prescription not at pharmacy and was prescribed by PCP recently  (Exception: triager has access to EMR and prescription is recorded there. Go to Home Care and confirm for pharmacy.)  • Negative: Pharmacy calling with prescription question and triager unable to answer question  • Negative: Caller has URGENT medicine question about med that PCP or specialist prescribed and triager unable to answer question  • Negative: Caller has NON-URGENT medicine question about med that PCP or specialist prescribed and triager unable to answer question  • Negative: Caller wants to use a complementary or alternative medicine  • Negative: Medicine patch causing local rash or itching  • Negative: Prescription request for new medicine (not a refill)  • Negative: Prescription prescribed recently is not at pharmacy and triager has access to patient's  "EMR and prescription is recorded in the EMR  • Negative: DOUBLE DOSE (an extra dose or lesser amount) of over-the-counter (OTC) drug and NO symptoms  • Negative: DOUBLE DOSE (an extra dose or lesser amount) of antibiotic drug and NO symptoms    Answer Assessment - Initial Assessment Questions  1. NAME of MEDICATION: \"What medicine are you calling about?\"      Lisinopril,  Bentyl, Ferrous Sulfate  2. QUESTION: \"What is your question?\" (e.g., double dose of medicine, side effect)      Took the above medications and states became light headed and disoriented  3. PRESCRIBING HCP: \"Who prescribed it?\" Reason: if prescribed by specialist, call should be referred to that group.      Hospital provider.  4. SYMPTOMS: \"Do you have any symptoms?\"      Not this a.m.  5. SEVERITY: If symptoms are present, ask \"Are they mild, moderate or severe?\"      Mild to moderat  6. PREGNANCY:  \"Is there any chance that you are pregnant?\" \"When was your last menstrual period?\"      no    Protocols used: MEDICATION QUESTION CALL-ADULT-[place]      "

## 2022-11-11 VITALS
DIASTOLIC BLOOD PRESSURE: 70 MMHG | RESPIRATION RATE: 18 BRPM | WEIGHT: 183 LBS | BODY MASS INDEX: 27.02 KG/M2 | TEMPERATURE: 97.7 F | SYSTOLIC BLOOD PRESSURE: 136 MMHG | OXYGEN SATURATION: 98 % | HEART RATE: 62 BPM

## 2022-11-11 NOTE — HOME HEALTH
SOC Note: RN to pt home to complete SOC visit/assessment. Pt was released from hospital yesterday following AAA repair/AVR/aortic valve repair. Pt states he honestly feels fine but has no energy and fatigues easily. He states he can definitely tell he is weaker but states he will feel better with time. RN offered pt PT to assess and pt declines. He states he also told his physician he did not want cardiac rehab. Pt states he has been active and continues to get back to being active again. RN reviewed medications with pt and pt has no questions at this time. Pt has follow up with his PCP this Friday, 11/11/22.     Home Health ordered for: disciplines SN for weakness post-op valve replacement    Reason for Hosp/Primary Dx/Co-morbidities: AVR/aortic valve repair; HTN, BPH, ED, HLD, DM Type 2, CKD Stage 3, Anemia, esophageal dysphagia    Focus of Care: SN for T/I post-op CABG care    Current Functional status/mobility/DME: Pt is ambulating in the home without assistive device. Pt does have a cane and a walker to use outside the home if needed. Pt wife is assisting with ADL's and iADL's.     HB status/Living Arrangements: Lives in multi [place] home with wife. Pt states he has been going up and down the stairs several times per day and isn't having difficulty with the stairs. Pt's bedroom is upstairs.     Skin Integrity/wound status: Pt has sternal incision and 4 small incisions under sternum from where chest tubes were inserted. Sternal incision is healing nicely with some scabbing noted and no drainage. Pt does have some light pink, bloody drainage coming from chest tubes sites and RN did cleanse them with NS, then covered with 4x4 dressing. Per MD orders to pt, pt to cleanse those incisions with peroxide and cover with 4x4 as long as they are draining. Pt is aware and that once they are no longer draining he is to leave open to air and only cleanse with antibacterial soap daily.     Code Status: Pt does want CPR but  does not want to be hooked up to life support machines.     Fall Risk: Yes    Plan for next visit: T/I post CABG care, monitor incision site, CP assess, review medications

## 2022-11-12 ENCOUNTER — HOME CARE VISIT (OUTPATIENT)
Dept: HOME HEALTH SERVICES | Facility: HOME HEALTHCARE | Age: 82
End: 2022-11-12

## 2022-11-12 PROCEDURE — G0299 HHS/HOSPICE OF RN EA 15 MIN: HCPCS

## 2022-11-13 VITALS
OXYGEN SATURATION: 95 % | BODY MASS INDEX: 26.34 KG/M2 | DIASTOLIC BLOOD PRESSURE: 68 MMHG | SYSTOLIC BLOOD PRESSURE: 124 MMHG | HEART RATE: 62 BPM | TEMPERATURE: 98 F | RESPIRATION RATE: 18 BRPM | WEIGHT: 178.38 LBS

## 2022-11-15 ENCOUNTER — HOSPITAL ENCOUNTER (OUTPATIENT)
Dept: CARDIOLOGY | Facility: HOSPITAL | Age: 82
Discharge: HOME | End: 2022-11-15
Admitting: NURSE PRACTITIONER

## 2022-11-15 ENCOUNTER — OFFICE VISIT (OUTPATIENT)
Dept: CARDIOLOGY | Facility: CLINIC | Age: 82
End: 2022-11-15

## 2022-11-15 VITALS
BODY MASS INDEX: 25.92 KG/M2 | SYSTOLIC BLOOD PRESSURE: 139 MMHG | HEIGHT: 69 IN | HEART RATE: 73 BPM | OXYGEN SATURATION: 98 % | WEIGHT: 175 LBS | DIASTOLIC BLOOD PRESSURE: 70 MMHG

## 2022-11-15 DIAGNOSIS — D62 POSTOPERATIVE ANEMIA DUE TO ACUTE BLOOD LOSS: ICD-10-CM

## 2022-11-15 DIAGNOSIS — I10 BENIGN ESSENTIAL HTN: ICD-10-CM

## 2022-11-15 DIAGNOSIS — E78.2 MIXED HYPERLIPIDEMIA: ICD-10-CM

## 2022-11-15 DIAGNOSIS — Z98.890 HISTORY OF AAA (ABDOMINAL AORTIC ANEURYSM) REPAIR: ICD-10-CM

## 2022-11-15 DIAGNOSIS — I95.1 AUTONOMIC ORTHOSTATIC HYPOTENSION: ICD-10-CM

## 2022-11-15 DIAGNOSIS — I77.810 ASCENDING AORTA DILATION: Primary | ICD-10-CM

## 2022-11-15 DIAGNOSIS — I35.1 NONRHEUMATIC AORTIC VALVE REGURGITATION: ICD-10-CM

## 2022-11-15 PROCEDURE — 93000 ELECTROCARDIOGRAM COMPLETE: CPT | Performed by: NURSE PRACTITIONER

## 2022-11-15 PROCEDURE — 96374 THER/PROPH/DIAG INJ IV PUSH: CPT

## 2022-11-15 PROCEDURE — 36415 COLL VENOUS BLD VENIPUNCTURE: CPT

## 2022-11-15 PROCEDURE — 99214 OFFICE O/P EST MOD 30 MIN: CPT | Performed by: NURSE PRACTITIONER

## 2022-11-15 RX ORDER — SODIUM CHLORIDE 9 MG/ML
500 INJECTION, SOLUTION INTRAVENOUS ONCE
Status: COMPLETED | OUTPATIENT
Start: 2022-11-15 | End: 2022-11-15

## 2022-11-15 RX ADMIN — SODIUM CHLORIDE 500 ML: 9 INJECTION, SOLUTION INTRAVENOUS at 12:57

## 2022-11-15 NOTE — PROGRESS NOTES
Date of Office Visit: 11/15/2022  Encounter Provider: INGE Charles  Place of Service: [facility] CARDIOLOGY  Patient Name: Imer Jordan  :1940    Chief Complaint   Patient presents with   • Follow-up   • Hypertension   • Atrial Fibrillation   :     HPI: Imer Jordan is a 82 y.o. male who is a patient of Dr. Vega and is known to me from previous hospitalization.  He has a history of paroxysmal atrial tachycardia.  He also has a history of aortic aneurysm and severe aortic valve regurgitation.  He had a AAA repair in  with juxtarenal aneurysm repair with a Hemashield gold graft with end and replacement of both hypogastric arteries after hypogastric artery endarterectomy.  Proximal aortic endarterectomy attaching the iliac artery end to side into each limb of the aortobiiliac graft endarterectomy of the SUSSY with reimplantation of Corel patch, endarterectomy of the left accessory renal artery.  Left and right cath at that time showed no significant obstructive CAD.    He came to the office in October on the third for follow-up of his chest discomfort.  He had a cardiac cath in April after being evaluated for aortic valve replacement and had severe ostial diagonal CAD.  He was scheduled for surgery in November.  He was admitted on  for aortic root repair and AVR.  His ostial diagonal was treated medically doing to being small in size.  Postoperative course was stable and he is here for follow-up today.    He is really not feeling well.  He is getting dizzy and feels like he is off balance when he goes to a standing position.  He is eating and drinking normally.  Over the last 2 days he has felt a little short of breath with exertion.  I did orthostatics in the office today and upon standing his systolic blood pressure dropped to the 80s from 139/70.  Previous testing and notes have been reviewed by me.   Past Medical History:   Diagnosis Date   • AAA  (abdominal aortic aneurysm) without rupture     2018 REPAIR [facility]   • Arthritis    • Ascending aorta dilation (HCC)     stated in 11- [facility] notes   • AVM (arteriovenous malformation) of colon     PER COLONOSCOPY   • Back pain    • Diverticulitis, colon 09/2012   • Erectile dysfunction    • GERD (gastroesophageal reflux disease)    • History of anemia     HAD BLOOD TRANSFUSION 5/2022, TAKING IRON   • Hypertension    • Mild renal insufficiency 04/13/2018    OVERVIEW:  Noted 4/13/2018 at [facility] during AAA surgery   • Nonrheumatic aortic valve regurgitation     stated in 11- [facility] notes   • Postoperative atrial fibrillation (HCC) 11/4/2022   • Psoriasis    • Scaling of skin     USES CREAM PRN, BUTTOCKS AND KNEES   • Schatzki's ring     SOME TROUBLE SWALLOWING OCCASIONALLY, LAST EGD 5/2022   • Spinal stenosis    • Stage 3 chronic kidney disease (HCC)        Past Surgical History:   Procedure Laterality Date   • ABDOMINAL AORTIC ANEURYSM REPAIR  2018   • ASCENDING AORTIC ANEURYSM REPAIR W/ MECHANICAL AORTIC VALVE REPLACEMENT N/A 11/2/2022    Procedure: INTRAOPERATIVE LIN; STERNOTOMY; AORTIC VALVE REPLACEMENT; ASCENDING AORTIC ANEURYSM REPAIR; ROOT REDUCTION WITH NEURO MONITORING AND CIRC ARREST; PRP.;  Surgeon: Henrique Louie MD;  Location: [facility] CVOR;  Service: Cardiothoracic;  Laterality: N/A;   • CARDIAC CATHETERIZATION Left 04/21/2022    Procedure: Coronary Angiography;  Surgeon: Magui Vega MD;  Location: [facility] CATH INVASIVE LOCATION;  Service: Cardiology;  Laterality: Left;   • CARDIAC CATHETERIZATION N/A 04/21/2022    Procedure: Left Heart Cath;  Surgeon: Magui Vega MD;  Location: [facility] CATH INVASIVE LOCATION;  Service: Cardiology;  Laterality: N/A;   • COLONOSCOPY  2013    negative per pt   • COLONOSCOPY N/A 05/09/2022    Procedure: COLONOSCOPY to terminal ileum with APC;  Surgeon: Gato Mccarthy MD;  Location: [facility] ENDOSCOPY;  Service:  Gastroenterology;  Laterality: N/A;  pre - anemia  post - diverticulosis, hemorrhoids, cecal avm,    • ENDOSCOPY N/A 05/09/2022    Procedure: ESOPHAGOGASTRODUODENOSCOPY;  Surgeon: Gato Mccarthy MD;  Location: [facility] ENDOSCOPY;  Service: Gastroenterology;  Laterality: N/A;  pre - anemia  post - hiatal hernia, schatzki ring   • ENDOSCOPY N/A 11/7/2022    Procedure: ESOPHAGOGASTRODUODENOSCOPY with biopsies;  Surgeon: Sobeida Rios MD;  Location: [facility] ENDOSCOPY;  Service: Gastroenterology;  Laterality: N/A;  Pre: dysphagia  Post: schatzkis ring, gastritis, irregular z-line, abnormal motility of esophagus   • RENAL ARTERY ENDARTERECTOMY Bilateral 04/02/2018    Done at [facility] along with aortic patch       Social History     Socioeconomic History   • Marital status:    Tobacco Use   • Smoking status: Some Days     Types: Cigars     Passive exposure: Yes   • Smokeless tobacco: Never   • Tobacco comments:     OCCASIONAL CIGAR     HX PIPE USE BUT STOPPED OVER 25 YEARS AGO   Vaping Use   • Vaping Use: Never used   Substance and Sexual Activity   • Alcohol use: No   • Drug use: No   • Sexual activity: Not Currently     Partners: Female     Birth control/protection: None       Family History   Problem Relation Age of Onset   • Thyroid cancer Mother    • Cancer Father    • Stroke Father    • Arthritis Sister    • Arthritis Sister    • Malig Hyperthermia Neg Hx        Review of Systems   Constitutional: Negative for diaphoresis and malaise/fatigue.   Cardiovascular: Negative for chest pain, claudication, dyspnea on exertion, irregular heartbeat, leg swelling, near-syncope, orthopnea, palpitations, paroxysmal nocturnal dyspnea and syncope.   Respiratory: Negative for cough, shortness of breath and sleep disturbances due to breathing.    Musculoskeletal: Negative for falls.   Neurological: Negative for dizziness and weakness.   Psychiatric/Behavioral: Negative for altered mental status and substance  abuse.       Allergies   Allergen Reactions   • Niacin Dizziness   • Spironolactone Nausea Only     Other reaction(s): Headache         Current Outpatient Medications:   •  aspirin 81 MG EC tablet, Take 1 tablet by mouth Daily., Disp: , Rfl:   •  betamethasone dipropionate (DIPROLENE) 0.05 % lotion, APPLY  TOPICALLY TO THE APPROPRIATE AREA AS DIRECTED TWICE DAILY (Patient taking differently: Apply 1 application topically to the appropriate area as directed 2 (Two) Times a Day As Needed.), Disp: 60 mL, Rfl: 2  •  clobetasol (TEMOVATE) 0.05 % cream, Apply 1 application topically to the appropriate area as directed 2 (Two) Times a Day As Needed., Disp: , Rfl:   •  dicyclomine (BENTYL) 10 MG capsule, Take 1 capsule by mouth 3 (Three) Times a Day Before Meals for 90 days., Disp: 90 capsule, Rfl: 2  •  ferrous gluconate (FERGON) 324 MG tablet, Take 1 tablet by mouth Daily With Breakfast., Disp: , Rfl:   •  finasteride (PROSCAR) 5 MG tablet, TAKE 1 TABLET EVERY DAY (Patient taking differently: Take 5 mg by mouth Daily.), Disp: 90 tablet, Rfl: 1  •  Glucos-MSM-C-It-Cspsep-Fdernw (GLUCOSAMINE MSM COMPLEX) tablet, Take 1 tablet by mouth Daily., Disp: , Rfl:   •  HYDROcodone-acetaminophen (NORCO) 5-325 MG per tablet, Take 1 tablet by mouth Every 4 (Four) Hours As Needed for Moderate Pain for up to 7 days., Disp: 42 tablet, Rfl: 0  •  ketoconazole (NIZORAL) 2 % cream, Apply 1 application topically to the appropriate area as directed Daily As Needed., Disp: , Rfl:   •  lisinopril (PRINIVIL,ZESTRIL) 10 MG tablet, Take 1 tablet by mouth Daily for 90 days., Disp: 30 tablet, Rfl: 2  •  metoprolol tartrate (LOPRESSOR) 25 MG tablet, Take 1 tablet by mouth Every 12 (Twelve) Hours for 90 days., Disp: 60 tablet, Rfl: 2  •  Olive Leaf Extract 250 MG capsule, Take 1 capsule by mouth Daily. PT TO HOLD FOR SURGERY, Disp: , Rfl:   •  Omega-3 Krill Oil 1000 MG capsule, Take 1,000 mg by mouth Daily. PT HOLDING FOR SURGERY, Disp: , Rfl:   •   "pantoprazole (PROTONIX) 40 MG EC tablet, Take 1 tablet by mouth Daily., Disp: 90 tablet, Rfl: 3  No current facility-administered medications for this visit.    Facility-Administered Medications Ordered in Other Visits:   •  Chlorhexidine Gluconate Cloth 2 % pads 1 application, 1 application, Topical, Q12H PRN, Suzan Seay APRN      Objective:     Vitals:    11/15/22 1204   BP: 139/70   Pulse: 73   SpO2: 98%   Weight: 79.4 kg (175 lb)   Height: 175.3 cm (69\")     Body mass index is 25.84 kg/m².    PHYSICAL EXAM:    Constitutional:       General: Not in acute distress.     Appearance: Normal appearance. Well-developed.   Eyes:      Pupils: Pupils are equal, round, and reactive to light.   HENT:      Head: Normocephalic.   Neck:      Vascular: No carotid bruit or JVD.   Pulmonary:      Effort: Pulmonary effort is normal. No tachypnea.      Breath sounds: Normal breath sounds. No wheezing. No rales.   Cardiovascular:      Normal rate. Regular rhythm.      No gallop.   Pulses:     Intact distal pulses.   Edema:     Peripheral edema absent.   Abdominal:      General: Bowel sounds are normal.      Palpations: Abdomen is soft.      Tenderness: There is no abdominal tenderness.   Musculoskeletal: Normal range of motion.      Cervical back: Normal range of motion and neck supple. No edema. Skin:     General: Skin is warm and dry.   Neurological:      Mental Status: Alert and oriented to person, place, and time.           ECG 12 Lead    Date/Time: 11/15/2022 12:20 PM  Performed by: Barbi Wells APRN  Authorized by: Barbi Wells APRN   Comparison: compared with previous ECG from 11/2/2022  Similar to previous ECG  Rhythm: sinus rhythm  Rate: normal  Conduction: non-specific intraventricular conduction delay  T inversion: V6, V5, V3, V4, II, III and aVF  QRS axis: normal  Other findings: non-specific ST-T wave changes    Clinical impression: non-specific ECG              Assessment:       Diagnosis Plan "   1. Ascending aorta dilation (HCC)        2. Benign essential HTN        3. History of AAA (abdominal aortic aneurysm) repair        4. Mixed hyperlipidemia        5. Nonrheumatic aortic valve regurgitation          No orders of the defined types were placed in this encounter.         Plan:       I am going to cut his lisinopril in half to 5 mg a day.  We will stop metoprolol.  He is also very fatigued and this could be contributing to that.  I am going to give him a 500 cc bolus of saline and check a CBC because he had some postop anemia.  He is taking iron supplementation.  I want him to come back in a few weeks he has a appointment with Dr. Vega and will call me in the interim if he has any further difficulty.         Your medication list          Accurate as of November 15, 2022 12:16 PM. If you have any questions, ask your nurse or doctor.            CHANGE how you take these medications      Instructions Last Dose Given Next Dose Due   betamethasone dipropionate 0.05 % lotion  What changed:   · how much to take  · when to take this  · reasons to take this  · additional instructions      APPLY  TOPICALLY TO THE APPROPRIATE AREA AS DIRECTED TWICE DAILY          CONTINUE taking these medications      Instructions Last Dose Given Next Dose Due   aspirin 81 MG EC tablet      Take 1 tablet by mouth Daily.       clobetasol 0.05 % cream  Commonly known as: TEMOVATE      Apply 1 application topically to the appropriate area as directed 2 (Two) Times a Day As Needed.       dicyclomine 10 MG capsule  Commonly known as: BENTYL      Take 1 capsule by mouth 3 (Three) Times a Day Before Meals for 90 days.       ferrous gluconate 324 MG tablet  Commonly known as: FERGON      Take 1 tablet by mouth Daily With Breakfast.       finasteride 5 MG tablet  Commonly known as: PROSCAR      TAKE 1 TABLET EVERY DAY       Glucosamine MSM Complex tablet      Take 1 tablet by mouth Daily.       HYDROcodone-acetaminophen 5-325 MG per  tablet  Commonly known as: NORCO      Take 1 tablet by mouth Every 4 (Four) Hours As Needed for Moderate Pain for up to 7 days.       ketoconazole 2 % cream  Commonly known as: NIZORAL      Apply 1 application topically to the appropriate area as directed Daily As Needed.       lisinopril 10 MG tablet  Commonly known as: PRINIVIL,ZESTRIL      Take 1 tablet by mouth Daily for 90 days.       metoprolol tartrate 25 MG tablet  Commonly known as: LOPRESSOR      Take 1 tablet by mouth Every 12 (Twelve) Hours for 90 days.       Olive Leaf Extract 250 MG capsule      Take 1 capsule by mouth Daily. PT TO HOLD FOR SURGERY       Omega-3 Krill Oil 1000 MG capsule      Take 1,000 mg by mouth Daily. PT HOLDING FOR SURGERY       pantoprazole 40 MG EC tablet  Commonly known as: PROTONIX      Take 1 tablet by mouth Daily.                As always, it has been a pleasure to participate in your patient's care.      Sincerely,     Barbi ALCAZAR

## 2022-11-16 ENCOUNTER — HOME CARE VISIT (OUTPATIENT)
Dept: HOME HEALTH SERVICES | Facility: HOME HEALTHCARE | Age: 82
End: 2022-11-16

## 2022-11-16 ENCOUNTER — TELEPHONE (OUTPATIENT)
Dept: CARDIOLOGY | Facility: CLINIC | Age: 82
End: 2022-11-16

## 2022-11-16 ENCOUNTER — LAB REQUISITION (OUTPATIENT)
Dept: LAB | Facility: HOSPITAL | Age: 82
End: 2022-11-16

## 2022-11-16 VITALS
SYSTOLIC BLOOD PRESSURE: 132 MMHG | DIASTOLIC BLOOD PRESSURE: 64 MMHG | OXYGEN SATURATION: 94 % | HEART RATE: 63 BPM | WEIGHT: 175.5 LBS | RESPIRATION RATE: 20 BRPM | BODY MASS INDEX: 25.92 KG/M2

## 2022-11-16 DIAGNOSIS — D64.9 ANEMIA, UNSPECIFIED: ICD-10-CM

## 2022-11-16 DIAGNOSIS — D62 POSTOPERATIVE ANEMIA DUE TO ACUTE BLOOD LOSS: Primary | ICD-10-CM

## 2022-11-16 LAB
BASOPHILS # BLD AUTO: 0.05 10*3/MM3 (ref 0–0.2)
BASOPHILS NFR BLD AUTO: 0.5 % (ref 0–1.5)
DEPRECATED RDW RBC AUTO: 49.3 FL (ref 37–54)
EOSINOPHIL # BLD AUTO: 0.09 10*3/MM3 (ref 0–0.4)
EOSINOPHIL NFR BLD AUTO: 0.9 % (ref 0.3–6.2)
ERYTHROCYTE [DISTWIDTH] IN BLOOD BY AUTOMATED COUNT: 15.2 % (ref 12.3–15.4)
HCT VFR BLD AUTO: 29.3 % (ref 37.5–51)
HGB BLD-MCNC: 10 G/DL (ref 13–17.7)
IMM GRANULOCYTES # BLD AUTO: 0.07 10*3/MM3 (ref 0–0.05)
IMM GRANULOCYTES NFR BLD AUTO: 0.7 % (ref 0–0.5)
LYMPHOCYTES # BLD AUTO: 0.7 10*3/MM3 (ref 0.7–3.1)
LYMPHOCYTES NFR BLD AUTO: 7.4 % (ref 19.6–45.3)
MCH RBC QN AUTO: 30.7 PG (ref 26.6–33)
MCHC RBC AUTO-ENTMCNC: 34.1 G/DL (ref 31.5–35.7)
MCV RBC AUTO: 89.9 FL (ref 79–97)
MONOCYTES # BLD AUTO: 0.69 10*3/MM3 (ref 0.1–0.9)
MONOCYTES NFR BLD AUTO: 7.3 % (ref 5–12)
NEUTROPHILS NFR BLD AUTO: 7.91 10*3/MM3 (ref 1.7–7)
NEUTROPHILS NFR BLD AUTO: 83.2 % (ref 42.7–76)
NRBC BLD AUTO-RTO: 0 /100 WBC (ref 0–0.2)
PLATELET # BLD AUTO: 344 10*3/MM3 (ref 140–450)
PMV BLD AUTO: 9.3 FL (ref 6–12)
RBC # BLD AUTO: 3.26 10*6/MM3 (ref 4.14–5.8)
WBC NRBC COR # BLD: 9.51 10*3/MM3 (ref 3.4–10.8)

## 2022-11-16 PROCEDURE — G0299 HHS/HOSPICE OF RN EA 15 MIN: HCPCS

## 2022-11-16 PROCEDURE — 85025 COMPLETE CBC W/AUTO DIFF WBC: CPT | Performed by: NURSE PRACTITIONER

## 2022-11-16 NOTE — TELEPHONE ENCOUNTER
Barbi Wells, Anel Cardenas MA  Please let patient know that his hgb is stable. No indication of bleeding or anemia.

## 2022-11-17 NOTE — HOME HEALTH
Patient was having c/o of dizziness at times. Medications changed Metoprolol discontinued and Lisinopril decreased to 5mg daily per [facility].  CBC lab drawn today for post op anemia.    Next visit: CP assess, teach constipation and importance of not straining. (patient taking Hydrocodone 5/325mg at times)  montior change in medications effects.

## 2022-11-18 ENCOUNTER — READMISSION MANAGEMENT (OUTPATIENT)
Dept: CALL CENTER | Facility: HOSPITAL | Age: 82
End: 2022-11-18

## 2022-11-18 ENCOUNTER — HOME CARE VISIT (OUTPATIENT)
Dept: HOME HEALTH SERVICES | Facility: HOME HEALTHCARE | Age: 82
End: 2022-11-18

## 2022-11-18 PROCEDURE — G0299 HHS/HOSPICE OF RN EA 15 MIN: HCPCS

## 2022-11-18 NOTE — OUTREACH NOTE
CT Surgery Week 2 Survey    Flowsheet Row Responses   [facility], [place] patient discharged from? [place]   Does the patient have one of the following disease processes/diagnoses(primary or secondary)? Cardiothoracic surgery   Week 2 attempt successful? Yes   Call start time 1004   Call end time 1006   Discharge diagnosis Ascending aortic aneurysm, root aneurysm, AV regurgitation- s/p AVR (tissue), ascending aneurysm repair, root reduction   Is the patient taking all medications as directed (includes completed medication regime)? Yes   Comments regarding appointments has had a f/u with cardiology   Does the patient have a primary care provider?  Yes   Does the patient have an appointment scheduled with their C/T surgeon? Yes   Has the patient kept scheduled appointments due by today? Yes   Comments f/u with cardiac surgeon on 12/14   What is the Home health agency?  [facility]   Home health comments home health is still coming   Psychosocial issues? No   What is the patient's perception of their health status since discharge? New symptoms unrelated to diagnosis  [says he is having some low blood pressure- has notified his MD and HH is coming to check on him today]   Nursing interventions Nurse provided patient education  [advised to f/u with doctor if things do not improve]   Is the patient/caregiver able to teach back the hierarchy of who to call/visit for symptoms/problems? PCP, Specialist, Home health nurse, Urgent Care, ED, 911 Yes   Week 2 call completed? Yes   Wrap up additional comments States he has been having some low BP issues, his doctor has been notified and  is checking on him today.          MARIANNE MO - Registered Nurse

## 2022-11-20 VITALS
WEIGHT: 173 LBS | SYSTOLIC BLOOD PRESSURE: 96 MMHG | HEART RATE: 100 BPM | DIASTOLIC BLOOD PRESSURE: 40 MMHG | BODY MASS INDEX: 25.55 KG/M2 | TEMPERATURE: 97.5 F | RESPIRATION RATE: 18 BRPM | OXYGEN SATURATION: 98 %

## 2022-11-21 ENCOUNTER — TELEPHONE (OUTPATIENT)
Dept: CARDIAC REHAB | Facility: HOSPITAL | Age: 82
End: 2022-11-21

## 2022-11-21 NOTE — HOME HEALTH
Today's Visit:     RN to pt home today to complete SN visit. Pt states he has been dizzy the past few days. RN reviewed where he has been tracking VS and weights and BP's have been running low 90's/50's the past 2 days. Pt states other nurse told him to hold his BP medication when running this low. RN states that low BP can cause him to feel dizzy. Pt states he understands this and has not taken his BP medication today. Today's BP is 96/40 in left arm. RN instructed pt to increase is water intake today. Pt has no edema and lungs are clear. Pt weights have also remained stable with no weight gain. Pt incision is clean and dy with a few scabs left. Pt denies any other issues and RN completed assessment.     Plan for next visit: CP assess, BP check, is pt still having dizzy spells?

## 2022-11-21 NOTE — TELEPHONE ENCOUNTER
Patient has declined cardiac rehab at this time. Provided him with contact information if he should change his mind and like to attend.

## 2022-11-22 ENCOUNTER — HOME CARE VISIT (OUTPATIENT)
Dept: HOME HEALTH SERVICES | Facility: HOME HEALTHCARE | Age: 82
End: 2022-11-22

## 2022-11-22 VITALS
HEART RATE: 79 BPM | DIASTOLIC BLOOD PRESSURE: 66 MMHG | TEMPERATURE: 98.8 F | OXYGEN SATURATION: 95 % | BODY MASS INDEX: 25.28 KG/M2 | WEIGHT: 171.2 LBS | SYSTOLIC BLOOD PRESSURE: 122 MMHG | RESPIRATION RATE: 20 BRPM

## 2022-11-22 PROCEDURE — G0299 HHS/HOSPICE OF RN EA 15 MIN: HCPCS

## 2022-11-29 ENCOUNTER — READMISSION MANAGEMENT (OUTPATIENT)
Dept: CALL CENTER | Facility: HOSPITAL | Age: 82
End: 2022-11-29

## 2022-11-29 NOTE — OUTREACH NOTE
CT Surgery Week 3 Survey    Flowsheet Row Responses   [facility] patient discharged from? [place]   Does the patient have one of the following disease processes/diagnoses(primary or secondary)? Cardiothoracic surgery   Week 3 attempt successful? No   Unsuccessful attempts Attempt 1          ILAN Hamlin Licensed Nurse

## 2022-12-01 ENCOUNTER — HOME CARE VISIT (OUTPATIENT)
Dept: HOME HEALTH SERVICES | Facility: HOME HEALTHCARE | Age: 82
End: 2022-12-01

## 2022-12-01 VITALS
SYSTOLIC BLOOD PRESSURE: 110 MMHG | DIASTOLIC BLOOD PRESSURE: 64 MMHG | WEIGHT: 172.3 LBS | HEART RATE: 88 BPM | TEMPERATURE: 97.4 F | BODY MASS INDEX: 25.44 KG/M2 | RESPIRATION RATE: 20 BRPM | OXYGEN SATURATION: 98 %

## 2022-12-01 PROCEDURE — G0299 HHS/HOSPICE OF RN EA 15 MIN: HCPCS

## 2022-12-07 ENCOUNTER — TELEPHONE (OUTPATIENT)
Dept: GASTROENTEROLOGY | Facility: CLINIC | Age: 82
End: 2022-12-07

## 2022-12-07 NOTE — TELEPHONE ENCOUNTER
----- Message from Sobeida Rios MD sent at 11/10/2022  8:44 PM EST -----  EGD biopsies with mild gastritis.  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      Continue pantoprazole daily.    Office follow up with Dr Mccarthy or Marla in 4-6 weeks    3 year EGD recall with Dr Mccarthy.

## 2022-12-07 NOTE — TELEPHONE ENCOUNTER
It is noted that this pt has seen his message from Dr Rios.    Fu is scheduled for shilpa with Dr Mccarthy    EGD recall 11/7/25

## 2022-12-09 ENCOUNTER — OFFICE VISIT (OUTPATIENT)
Dept: CARDIOLOGY | Facility: CLINIC | Age: 82
End: 2022-12-09

## 2022-12-09 VITALS
DIASTOLIC BLOOD PRESSURE: 98 MMHG | SYSTOLIC BLOOD PRESSURE: 150 MMHG | HEIGHT: 69 IN | WEIGHT: 181 LBS | BODY MASS INDEX: 26.81 KG/M2 | HEART RATE: 77 BPM

## 2022-12-09 DIAGNOSIS — I35.1 AORTIC VALVE INSUFFICIENCY, ETIOLOGY OF CARDIAC VALVE DISEASE UNSPECIFIED: Primary | ICD-10-CM

## 2022-12-09 PROCEDURE — G0180 MD CERTIFICATION HHA PATIENT: HCPCS | Performed by: THORACIC SURGERY (CARDIOTHORACIC VASCULAR SURGERY)

## 2022-12-09 PROCEDURE — 99214 OFFICE O/P EST MOD 30 MIN: CPT | Performed by: INTERNAL MEDICINE

## 2022-12-09 RX ORDER — LISINOPRIL 5 MG/1
5 TABLET ORAL 2 TIMES DAILY
Qty: 60 TABLET | Refills: 2
Start: 2022-12-09 | End: 2022-12-21

## 2022-12-09 NOTE — PROGRESS NOTES
Subjective:     Encounter Date:12/09/22      Patient ID: Imer Jordan is a 82 y.o. male.    Chief Complaint: Establish care, AI, PAD, AAA, ascending aneurysm  HPI:   This is a 81-year-old man who presents for evaluation.  He has an extensive cardiovascular history.  He has known severe aortic regurgitation by LIN in 2019.  In 2018 he underwent AAA repair with juxtarenal aneurysm repair with a Hemashield gold graft with end and replacement of both hypogastric arteries after hypogastric artery endarterectomy.  Proximal aortic endarterectomy attaching the external iliac artery end-to-side into each limb of the aortobiiliac graft endarterectomy of the SUSSY with reimplantation of Carrel patch, endarterectomy of the left accessory renal artery.  Left and right cardiac catheterization in 2019 showed no evidence of obstructive coronary disease.    All of his above work-up was performed at the clinic clinic.  When he saw the cardiologist in 2019 they scheduled a follow-up stress echo but also noted the need for aortic valve replacement.  I met him in April 2022. He complained of dyspnea and fatigue. He was anemic and iron saturation was 5%. I performed a cardiac cath showing severe ostial diagonal CAD which is being treated medically due to lack of angina.  In October 22 he underwent AVR with aortic graft repair.  He had postoperative atrial fibrillation but converted quickly back to normal sinus rhythm.  We elected not to anticoagulate him.     He returns today for follow-up.  He feels great.  He has no complaints.  He was briefly on metoprolol but that made him very lightheaded so he stopped it.  He is taking lisinopril 5 twice daily and his blood pressures are in the 1 20-1 40 range.  He is walking about half a mile per day.  He has no angina or dyspnea.  He feels like his legs are little bit weak but are improving with exercise.      The very pleasant man who is .  He has a good understanding of his medical  conditions.  He occasionally smokes cigars.  He does not drink alcohol.  He has a history of CKD stage III    The following portions of the patient's history were reviewed and updated as appropriate: allergies, current medications, past family history, past medical history, past social history, past surgical history and problem list.     REVIEW OF SYSTEMS:   All systems reviewed.  Pertinent positives identified in HPI.  All other systems are negative.    Past Medical History:   Diagnosis Date   • AAA (abdominal aortic aneurysm) without rupture     2018 REPAIR [facility]   • Arthritis    • Ascending aorta dilation (HCC)     stated in 11- [facility] notes   • AVM (arteriovenous malformation) of colon     PER COLONOSCOPY   • Back pain    • Diverticulitis, colon 09/2012   • Erectile dysfunction    • GERD (gastroesophageal reflux disease)    • History of anemia     HAD BLOOD TRANSFUSION 5/2022, TAKING IRON   • Hypertension    • Mild renal insufficiency 04/13/2018    OVERVIEW:  Noted 4/13/2018 at [facility] during AAA surgery   • Nonrheumatic aortic valve regurgitation     stated in 11- [facility] notes   • Postoperative atrial fibrillation (HCC) 11/4/2022   • Psoriasis    • Scaling of skin     USES CREAM PRN, BUTTOCKS AND KNEES   • Schatzki's ring     SOME TROUBLE SWALLOWING OCCASIONALLY, LAST EGD 5/2022   • Spinal stenosis    • Stage 3 chronic kidney disease (HCC)        Family History   Problem Relation Age of Onset   • Thyroid cancer Mother    • Cancer Father    • Stroke Father    • Arthritis Sister    • Arthritis Sister    • Malig Hyperthermia Neg Hx        Social History     Socioeconomic History   • Marital status:    Tobacco Use   • Smoking status: Some Days     Types: Cigars     Passive exposure: Yes   • Smokeless tobacco: Never   • Tobacco comments:     OCCASIONAL CIGAR     HX PIPE USE BUT STOPPED OVER 25 YEARS AGO   Vaping Use   • Vaping Use: Never used   Substance  and Sexual Activity   • Alcohol use: No   • Drug use: No   • Sexual activity: Not Currently     Partners: Female     Birth control/protection: None       Allergies   Allergen Reactions   • Niacin Dizziness   • Spironolactone Nausea Only     Other reaction(s): Headache   • Statins Myalgia       Past Surgical History:   Procedure Laterality Date   • ABDOMINAL AORTIC ANEURYSM REPAIR  2018   • ASCENDING AORTIC ANEURYSM REPAIR W/ MECHANICAL AORTIC VALVE REPLACEMENT N/A 11/2/2022    Procedure: INTRAOPERATIVE LIN; STERNOTOMY; AORTIC VALVE REPLACEMENT; ASCENDING AORTIC ANEURYSM REPAIR; ROOT REDUCTION WITH NEURO MONITORING AND CIRC ARREST; PRP.;  Surgeon: Henrique Louie MD;  Location: [facility] CVOR;  Service: Cardiothoracic;  Laterality: N/A;   • CARDIAC CATHETERIZATION Left 04/21/2022    Procedure: Coronary Angiography;  Surgeon: Magui Vega MD;  Location: [facility] CATH INVASIVE LOCATION;  Service: Cardiology;  Laterality: Left;   • CARDIAC CATHETERIZATION N/A 04/21/2022    Procedure: Left Heart Cath;  Surgeon: Magui Vega MD;  Location: [facility] CATH INVASIVE LOCATION;  Service: Cardiology;  Laterality: N/A;   • COLONOSCOPY  2013    negative per pt   • COLONOSCOPY N/A 05/09/2022    Procedure: COLONOSCOPY to terminal ileum with APC;  Surgeon: Gato Mccarthy MD;  Location: [facility] ENDOSCOPY;  Service: Gastroenterology;  Laterality: N/A;  pre - anemia  post - diverticulosis, hemorrhoids, cecal avm,    • ENDOSCOPY N/A 05/09/2022    Procedure: ESOPHAGOGASTRODUODENOSCOPY;  Surgeon: Gato Mccarthy MD;  Location: [facility] ENDOSCOPY;  Service: Gastroenterology;  Laterality: N/A;  pre - anemia  post - hiatal hernia, schatzki ring   • ENDOSCOPY N/A 11/7/2022    Procedure: ESOPHAGOGASTRODUODENOSCOPY with biopsies;  Surgeon: Sobeida Rios MD;  Location: [facility] ENDOSCOPY;  Service: Gastroenterology;  Laterality: N/A;  Pre: dysphagia  Post: schatzkis ring, gastritis, irregular z-line, abnormal motility of esophagus   •  RENAL ARTERY ENDARTERECTOMY Bilateral 04/02/2018    Done at [facility] along with aortic patch       Procedures     Objective:         PHYSICAL EXAM:  GEN: VSS, no distress,   Eyes: normal sclera, normal lids and lashes  HENT: moist mucus membranes,   Respiratory: CTAB, no rales or wheezes  CV: RRR, loud blowing 4 out of 6 aortic insufficiency murmur throughout the precordium radiating into the abdomen and carotids, +2 DP and 2+ carotid pulses b/l  GI: NABS, soft,  Nontender, nondistended  MSK: no edema, no scoliosis or kyphosis  Skin: no rash, warm, dry  Heme/Lymph: no bruising or bleeding  Psych: organized thought, normal behavior and affect  Neuro: Cranial nerves grossly intact, Alert and Oriented x 3.         Assessment:          Diagnosis Plan   1. Aortic valve insufficiency, etiology of cardiac valve disease unspecified               Plan:         1.  Severe AI: Status post AVR with aortic root repair/replacement 2022.   Postoperative atrial fibrillation has not recurred.  No anticoagulation.  2.  CAD: Ostial diagonal disease.  Medically managed.  CCS class I symptoms  3.  Peripheral arterial disease: Extensive AAA repair with iliac end-to-end anastomosis. Repeat imaging in 2022 shows no significant lower extremity disease   4.  CKD stage III: Sees Jason Gómez.  Currently on lisinopril 5 twice daily.  Monitor pressures.  Continue.    Dr. Brizuela and Dr. Louie, thank you very much for referring this kind patient to me. Please call me with any questions or concerns. I will see the patient again in the hospital at the time of surgery.       Magui Vega MD  12/09/22  [facility]    Outpatient Encounter Medications as of 12/9/2022   Medication Sig Dispense Refill   • aspirin 81 MG EC tablet Take 1 tablet by mouth Daily.     • betamethasone dipropionate (DIPROLENE) 0.05 % lotion APPLY  TOPICALLY TO THE APPROPRIATE AREA AS DIRECTED TWICE DAILY (Patient taking differently: Apply 1 application  topically to the appropriate area as directed 2 (Two) Times a Day As Needed.) 60 mL 2   • clobetasol (TEMOVATE) 0.05 % cream Apply 1 application topically to the appropriate area as directed 2 (Two) Times a Day As Needed. Indications: Inflammation, Itching     • dicyclomine (BENTYL) 10 MG capsule Take 1 capsule by mouth 3 (Three) Times a Day Before Meals for 90 days. 90 capsule 2   • ferrous gluconate (FERGON) 324 MG tablet Take 1 tablet by mouth Daily With Breakfast.     • finasteride (PROSCAR) 5 MG tablet TAKE 1 TABLET EVERY DAY (Patient taking differently: Take 5 mg by mouth Daily.) 90 tablet 1   • Glucos-MSM-C-Xu-Xrylap-Ybghgp (GLUCOSAMINE MSM COMPLEX) tablet Take 1 tablet by mouth Daily.     • HYDROcodone-acetaminophen (NORCO) 5-325 MG per tablet Take 1 tablet by mouth Every 4 (Four) Hours As Needed. prn pain     • ketoconazole (NIZORAL) 2 % cream Apply 1 application topically to the appropriate area as directed Daily As Needed.     • lisinopril (PRINIVIL,ZESTRIL) 10 MG tablet Take 1 tablet by mouth Daily for 90 days. (Patient taking differently: Take 10 mg by mouth Daily. 1/2 tablet daily) 30 tablet 2   • metoprolol tartrate (LOPRESSOR) 25 MG tablet Take 1 tablet by mouth Every 12 (Twelve) Hours for 90 days. 60 tablet 2   • Olive Leaf Extract 250 MG capsule Take 1 capsule by mouth Daily. PT TO HOLD FOR SURGERY     • Omega-3 Krill Oil 1000 MG capsule Take 1,000 mg by mouth Daily. PT HOLDING FOR SURGERY     • pantoprazole (PROTONIX) 40 MG EC tablet Take 1 tablet by mouth Daily. 90 tablet 3     Facility-Administered Encounter Medications as of 12/9/2022   Medication Dose Route Frequency Provider Last Rate Last Admin   • Chlorhexidine Gluconate Cloth 2 % pads 1 application  1 application Topical Q12H PRN Suzan Seay, APRN

## 2022-12-16 ENCOUNTER — OFFICE VISIT (OUTPATIENT)
Dept: CARDIAC SURGERY | Facility: CLINIC | Age: 82
End: 2022-12-16

## 2022-12-16 VITALS
SYSTOLIC BLOOD PRESSURE: 175 MMHG | DIASTOLIC BLOOD PRESSURE: 93 MMHG | HEART RATE: 82 BPM | WEIGHT: 177 LBS | BODY MASS INDEX: 26.22 KG/M2 | RESPIRATION RATE: 20 BRPM | HEIGHT: 69 IN | TEMPERATURE: 97.3 F | OXYGEN SATURATION: 98 %

## 2022-12-16 DIAGNOSIS — Z86.79 S/P ASCENDING AORTIC ANEURYSM REPAIR: Primary | ICD-10-CM

## 2022-12-16 DIAGNOSIS — Z98.890 S/P ASCENDING AORTIC ANEURYSM REPAIR: Primary | ICD-10-CM

## 2022-12-16 DIAGNOSIS — Z95.2 S/P AVR (AORTIC VALVE REPLACEMENT): ICD-10-CM

## 2022-12-16 PROCEDURE — 99024 POSTOP FOLLOW-UP VISIT: CPT | Performed by: NURSE PRACTITIONER

## 2022-12-16 NOTE — PROGRESS NOTES
"CARDIOVASCULAR SURGERY FOLLOW-UP PROGRESS NOTE  Chief Complaint: Postop follow-up        HPI:   Dear Dr. Brizuela, Ann Marie BESS MD and colleagues:    It was nice to see Imer Jordan in follow up 6 weeks after surgery.  As you know, he is a 82 y.o. male with ascending aortic aneurysm, aortic insufficiency, hypertension, hyperlipidemia, CKD 3 (baseline creatinine 1.3-1.5), BPH, GERD, dysphagia with Schatzki ring per EGD, AAA repair 2018 who underwent ascending aortic and aortic arch aneurysm repair, root reduction, tissue AVR, and aortic arch endarterectomy for protruding plaque removal on 11/2/2022 at [facility] with Dr. Louie.  He had atrial fibrillation postoperatively but no anticoagulation was deemed necessary as he had unexplained anemia preoperatively and the brevity of arrhythmia.  He comes in today complaining of nothing.  His activity level has been good.  From a surgical standpoint, the sternal incision is well approximated without erythema, edema, or drainage.  The sternum is stable to palpation, and the patient denies any popping or clicking with deep inspiration or coughing.  His blood pressure is elevated in the office today, on review of his home readings his blood pressure varies from 10 1-1 60 even during the same day, he does have follow-up appointment scheduled with his cardiologist, he is to take his continuing blood pressure log for medication titration at their discretion.    Physical Exam:         /93 (BP Location: Left arm, Patient Position: Sitting, Cuff Size: Adult)   Pulse 82   Temp 97.3 °F (36.3 °C)   Resp 20   Ht 175.3 cm (69\")   Wt 80.3 kg (177 lb)   SpO2 98%   BMI 26.14 kg/m²   Heart:  regular rate and rhythm, S1, S2 normal, no murmur, click, rub or gallop  Lungs:  clear to auscultation bilaterally  Extremities:  no edema  Incision(s):  mid chest healing well, sternum stable    Assessment/Plan:     S/P AVR and ascending aortic aneurysm repair. Overall, he is " doing well.    No significant post-op complications    Follow-up as scheduled with cardiology  Return to clinic in 1 year(s) with CT chest without contrast  Prophylactic antibiotics before dental work and other surgeries lifelong with artificial valve, prosthesis, or grafting    Continue lifting restriction of 10 lbs until 6 weeks and 50 lbs until 12 weeks from the date of surgery, no excessive jarring motions or twisting motions until 12 weeks from the date of surgery    Return to clinic if any signs or symptoms of infection or sternal instability develop       Thank you for allowing me to participate in the care of your patient.    Regards,  INGE Cosme

## 2022-12-21 ENCOUNTER — TELEPHONE (OUTPATIENT)
Dept: CARDIOLOGY | Facility: CLINIC | Age: 82
End: 2022-12-21

## 2022-12-21 ENCOUNTER — PATIENT MESSAGE (OUTPATIENT)
Dept: CARDIOLOGY | Facility: CLINIC | Age: 82
End: 2022-12-21

## 2022-12-21 RX ORDER — LISINOPRIL 10 MG/1
10 TABLET ORAL 2 TIMES DAILY
Qty: 90 TABLET | Refills: 3 | Status: SHIPPED | OUTPATIENT
Start: 2022-12-21 | End: 2023-04-06 | Stop reason: SDUPTHER

## 2022-12-21 NOTE — TELEPHONE ENCOUNTER
----- Message from Elizabeth Khoury sent at 4/12/2019  2:17 PM CDT -----  Contact: self/[phone]  Pt called in regards to her Rx for losartan (COZAAR) 50 MG tablet 180 tablet 3 3/28/2019 3/27/2020   Take 2 tablets (100 mg total) by mouth once daily. For blood pressure, switch from lisinopril     Is making her feel bad and she wants to stop taking it. It makes her feel  tired, sluggish, depress and just want to lay around. She feels like that every time she take her med's.     Please advise   Yes he can continue lisinopril 10 BID. I will send more in to the pharmacy.       Also for your education, an AAA repair is abdominal aortic aneurysm repair. He had an thoracic/ascending aortic aneurysm repair. Same letters but AAA is not used for that.

## 2022-12-21 NOTE — TELEPHONE ENCOUNTER
Called and spoke with patient and gave him BU recommendations to increase lisinopril to 10 mg twice per day, also let him know we sent that electronically to his pharmacy.  Instructed patient to continue to monitor BP , but it may take the medication change up to 7-10 days to work fully.  He will call us back with any concerns/issues.    Lisa Voss RN  [facility] T[facility]  12/21/22 10:42 EST

## 2022-12-21 NOTE — TELEPHONE ENCOUNTER
BU pt// LOV 12/9    Patient calling to report concern that his BPs have been more elevated for 2 days.  He has a mild headache.  Denies vision changes and CP.  He denies increased sodium intake recently.  His BP is currently 180/107 at 0815 this morning, but he is just now about to take his AM meds.  His HR average is 75-85. Per chart review he is s/p AAA repair and AVR.    BP log:  Date Time BP   16-Dec 800 143/88   17-Dec 816 112/68   18-Dec 900 161/100    1130 119/70    1400 137/78    1600 161/71    2100 141/81   19-Dec 900 179/105    1400 110/67    2000 150/88    2200 167/94    2245 202/112   21-Dec 845 147/93    1130 122/71    1320 135/85    1800 181/108    2000 151/80    2200 157/93    2248 142/84   22-Dec 815 180/107     He attached some additional readings in a message today and sent through [employer] that you can also look at.    He is on lisinopril 5 mg BID- he increased his lisinopril yesterday on his own and took 10 mg yesterday morning and last night in attempt to lower it.  He reports he had been on metoprolol but was taken off-pt reports he did not tolerate it well was having dizziness.     If needed: he [place] [facility] [place]    Recommendations for elevated BP?    Thanks   Lisa Voss RN  [facility] T[facility]  12/21/22 09:33 EST

## 2022-12-30 ENCOUNTER — TELEPHONE (OUTPATIENT)
Dept: FAMILY MEDICINE CLINIC | Facility: CLINIC | Age: 82
End: 2022-12-30

## 2022-12-30 DIAGNOSIS — U07.1 COVID-19 VIRUS INFECTION: Primary | ICD-10-CM

## 2022-12-30 NOTE — TELEPHONE ENCOUNTER
He is a good candidate and I have sent in the prescription. Please remind him that this is an Emergency Use medication but I still would recommend taking it as it has good data as far as reducing death and hospitalization.

## 2022-12-30 NOTE — TELEPHONE ENCOUNTER
Caller: Imer Jordan    Relationship to patient: Self    Best call back number: [phone]     Date of exposure: UNKNOWN    Date of positive COVID19 test: 12/30/22    COVID19 symptoms: COUGH / SORE THROAT    Date of initial quarantine: 12/30/22    Additional information or concerns: PATIENT HAD HEART SURGERY IN November, HE WOULD LIKE TO KNOW HOW TO PROCEED. - SYMPTOMS STARTED ON 12/28/22    What is the patients preferred pharmacy: [facility] [id number] - [address] AT [facility] [address] - [phone] [facility] [phone]   [phone]

## 2023-01-10 NOTE — PROGRESS NOTES
Assessment and Plan     Patient Instructions    Problem List Items Addressed This Visit        Cardiac and Vasculature    Benign essential HTN    Overview     [facility] 1/11/2023  BP is up a little here today but better than where it has been. It is trending down and he is working with his cardiologist. He brings in a list of Bps going back since December. Was having intermittent 170-180 numbers. Now highest is in the 150s and not common. Probably averaging in high 130's.          Relevant Orders    Comprehensive Metabolic Panel    Postoperative atrial fibrillation (HCC)    Relevant Orders    CBC & Differential       Genitourinary and Reproductive     Stage 3a chronic kidney disease (HCC)    Overview     OVERVIEW:  Noted 4/13/2018 at [facility] during AAA surgery         Current Assessment & Plan     [facility] 1/11/2023  Sees nephrology for this. Stable.         Other Visit Diagnoses     Need for vaccination    -  Primary    Relevant Orders    Fluzone High-Dose 65+yrs (Completed)             Return in about 6 months (around 7/11/2023) for yearly Medicare Exam, lab with next visit.          Imer is a 82 y.o. being seen today for  Hypertension and Hyperlipidemia   Subjective   History of the Present Illness   His BP has been a little elevated but he's been working with Dr Vega, his cardiologist and it is trending down. No med refills needed today. Needs some baseline labs after having surgery last November.   Social History  He  reports that he has been smoking cigars. He has been exposed to tobacco smoke. He has never used smokeless tobacco. He reports that he does not drink alcohol and does not use drugs.  Objective   Vital Signs        BP Readings from Last 1 Encounters:   01/11/23 140/82     Wt Readings from Last 3 Encounters:   01/11/23 81.6 kg (180 lb)   12/16/22 80.3 kg (177 lb)   12/09/22 82.1 kg (181 lb)   Body mass index is 26.58 kg/m².     Physical Exam  Vitals reviewed.   Constitutional:        Appearance: Normal appearance.   HENT:      Head:      Comments: Mask in place.  Cardiovascular:      Rate and Rhythm: Normal rate and regular rhythm.      Heart sounds: No murmur heard.  Pulmonary:      Effort: Pulmonary effort is normal.      Breath sounds: Normal breath sounds. No wheezing.   Neurological:      Mental Status: He is alert and oriented to person, place, and time.   Psychiatric:         Mood and Affect: Mood normal.         Behavior: Behavior normal.         Thought Content: Thought content normal.         Judgment: Judgment normal.               Patient was given instructions and counseling regarding his condition or for health maintenance advice. Please see specific information pulled into the AVS if appropriate.

## 2023-01-11 ENCOUNTER — OFFICE VISIT (OUTPATIENT)
Dept: FAMILY MEDICINE CLINIC | Facility: CLINIC | Age: 83
End: 2023-01-11
Payer: MEDICARE

## 2023-01-11 VITALS
SYSTOLIC BLOOD PRESSURE: 140 MMHG | OXYGEN SATURATION: 97 % | HEIGHT: 69 IN | RESPIRATION RATE: 20 BRPM | HEART RATE: 62 BPM | DIASTOLIC BLOOD PRESSURE: 82 MMHG | BODY MASS INDEX: 26.66 KG/M2 | WEIGHT: 180 LBS

## 2023-01-11 DIAGNOSIS — Z23 NEED FOR VACCINATION: Primary | ICD-10-CM

## 2023-01-11 DIAGNOSIS — I97.89 POSTOPERATIVE ATRIAL FIBRILLATION: ICD-10-CM

## 2023-01-11 DIAGNOSIS — I48.91 POSTOPERATIVE ATRIAL FIBRILLATION: ICD-10-CM

## 2023-01-11 DIAGNOSIS — I10 BENIGN ESSENTIAL HTN: ICD-10-CM

## 2023-01-11 DIAGNOSIS — N18.31 STAGE 3A CHRONIC KIDNEY DISEASE: ICD-10-CM

## 2023-01-11 PROBLEM — R13.19 ESOPHAGEAL DYSPHAGIA: Status: RESOLVED | Noted: 2022-07-06 | Resolved: 2023-01-11

## 2023-01-11 PROBLEM — R93.3 ABNORMAL ESOPHAGRAM: Status: RESOLVED | Noted: 2022-07-06 | Resolved: 2023-01-11

## 2023-01-11 PROBLEM — I77.810 ASCENDING AORTA DILATION (HCC): Status: RESOLVED | Noted: 2019-07-16 | Resolved: 2023-01-11

## 2023-01-11 LAB
BASOPHILS # BLD AUTO: 0.03 10*3/MM3 (ref 0–0.2)
BASOPHILS NFR BLD AUTO: 0.4 % (ref 0–1.5)
EOSINOPHIL # BLD AUTO: 0.1 10*3/MM3 (ref 0–0.4)
EOSINOPHIL NFR BLD AUTO: 1.5 % (ref 0.3–6.2)
ERYTHROCYTE [DISTWIDTH] IN BLOOD BY AUTOMATED COUNT: 13.3 % (ref 12.3–15.4)
HCT VFR BLD AUTO: 35.6 % (ref 37.5–51)
HGB BLD-MCNC: 11.6 G/DL (ref 13–17.7)
IMM GRANULOCYTES # BLD AUTO: 0.03 10*3/MM3 (ref 0–0.05)
IMM GRANULOCYTES NFR BLD AUTO: 0.4 % (ref 0–0.5)
LYMPHOCYTES # BLD AUTO: 0.78 10*3/MM3 (ref 0.7–3.1)
LYMPHOCYTES NFR BLD AUTO: 11.5 % (ref 19.6–45.3)
MCH RBC QN AUTO: 27.4 PG (ref 26.6–33)
MCHC RBC AUTO-ENTMCNC: 32.6 G/DL (ref 31.5–35.7)
MCV RBC AUTO: 84.2 FL (ref 79–97)
MONOCYTES # BLD AUTO: 0.56 10*3/MM3 (ref 0.1–0.9)
MONOCYTES NFR BLD AUTO: 8.3 % (ref 5–12)
NEUTROPHILS # BLD AUTO: 5.27 10*3/MM3 (ref 1.7–7)
NEUTROPHILS NFR BLD AUTO: 77.9 % (ref 42.7–76)
NRBC BLD AUTO-RTO: 0 /100 WBC (ref 0–0.2)
PLATELET # BLD AUTO: 230 10*3/MM3 (ref 140–450)
RBC # BLD AUTO: 4.23 10*6/MM3 (ref 4.14–5.8)
WBC # BLD AUTO: 6.77 10*3/MM3 (ref 3.4–10.8)

## 2023-01-11 PROCEDURE — 90662 IIV NO PRSV INCREASED AG IM: CPT | Performed by: FAMILY MEDICINE

## 2023-01-11 PROCEDURE — G0008 ADMIN INFLUENZA VIRUS VAC: HCPCS | Performed by: FAMILY MEDICINE

## 2023-01-11 PROCEDURE — 99213 OFFICE O/P EST LOW 20 MIN: CPT | Performed by: FAMILY MEDICINE

## 2023-01-12 LAB
ALBUMIN SERPL-MCNC: 3.7 G/DL (ref 3.5–5.2)
ALBUMIN/GLOB SERPL: 1.7 G/DL
ALP SERPL-CCNC: 127 U/L (ref 39–117)
ALT SERPL-CCNC: 10 U/L (ref 1–41)
AST SERPL-CCNC: 15 U/L (ref 1–40)
BILIRUB SERPL-MCNC: 0.3 MG/DL (ref 0–1.2)
BUN SERPL-MCNC: 27 MG/DL (ref 8–23)
BUN/CREAT SERPL: 19.1 (ref 7–25)
CALCIUM SERPL-MCNC: 8.3 MG/DL (ref 8.6–10.5)
CHLORIDE SERPL-SCNC: 107 MMOL/L (ref 98–107)
CO2 SERPL-SCNC: 26 MMOL/L (ref 22–29)
CREAT SERPL-MCNC: 1.41 MG/DL (ref 0.76–1.27)
EGFRCR SERPLBLD CKD-EPI 2021: 50.4 ML/MIN/1.73
GLOBULIN SER CALC-MCNC: 2.2 GM/DL
GLUCOSE SERPL-MCNC: 98 MG/DL (ref 65–99)
POTASSIUM SERPL-SCNC: 4.1 MMOL/L (ref 3.5–5.2)
PROT SERPL-MCNC: 5.9 G/DL (ref 6–8.5)
SODIUM SERPL-SCNC: 143 MMOL/L (ref 136–145)

## 2023-02-21 ENCOUNTER — OFFICE VISIT (OUTPATIENT)
Dept: GASTROENTEROLOGY | Facility: CLINIC | Age: 83
End: 2023-02-21
Payer: MEDICARE

## 2023-02-21 ENCOUNTER — TELEPHONE (OUTPATIENT)
Dept: GASTROENTEROLOGY | Facility: CLINIC | Age: 83
End: 2023-02-21
Payer: MEDICARE

## 2023-02-21 VITALS
OXYGEN SATURATION: 95 % | SYSTOLIC BLOOD PRESSURE: 177 MMHG | HEIGHT: 69 IN | TEMPERATURE: 96.3 F | WEIGHT: 183.6 LBS | DIASTOLIC BLOOD PRESSURE: 92 MMHG | BODY MASS INDEX: 27.19 KG/M2 | HEART RATE: 73 BPM

## 2023-02-21 DIAGNOSIS — K22.0 ACHALASIA: Primary | ICD-10-CM

## 2023-02-21 DIAGNOSIS — R13.19 ESOPHAGEAL DYSPHAGIA: ICD-10-CM

## 2023-02-21 PROCEDURE — 99213 OFFICE O/P EST LOW 20 MIN: CPT | Performed by: INTERNAL MEDICINE

## 2023-02-21 RX ORDER — DICYCLOMINE HYDROCHLORIDE 10 MG/1
10 CAPSULE ORAL
COMMUNITY
End: 2023-03-09

## 2023-02-21 NOTE — PROGRESS NOTES
Chief Complaint   Patient presents with   • Difficulty Swallowing       Imer Jordan is a  82 y.o. male here for a follow up visit for dysphagia.    HPI     Patient 82-year-old male with history of hypertension, GERD, Schatzki's ring with ongoing issues with dysphagia.  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  EGD with mild Schatzki's ring biopsy with intestinal metaplasia but at the GE junction with no irregular mucosa.  Likely biopsies taken from the gastric side and not actual Anguiano's.  Patient with ongoing symptoms reports when he watches his diet he is doing better but does report when he takes his 8 pills in the morning mucus and saliva begin to back up and he needs to spit it out for several hours after his morning pills.  Patient here for further recommendations.    Past Medical History:   Diagnosis Date   • AAA (abdominal aortic aneurysm) without rupture     2018 REPAIR [facility]   • Anemia    • Arthritis    • Ascending aorta dilation (HCC)     stated in 11- [facility] notes   • AVM (arteriovenous malformation) of colon     PER COLONOSCOPY   • Back pain    • Diverticulitis, colon 09/2012   • Erectile dysfunction    • GERD (gastroesophageal reflux disease)    • GI (gastrointestinal bleed)    • History of anemia     HAD BLOOD TRANSFUSION 5/2022, TAKING IRON   • Hypertension    • Mild renal insufficiency 04/13/2018    OVERVIEW:  Noted 4/13/2018 at [facility] during AAA surgery   • Nonrheumatic aortic valve regurgitation     stated in 11- [facility] notes   • Postoperative atrial fibrillation (HCC) 11/04/2022   • Psoriasis    • Scaling of skin     USES CREAM PRN, BUTTOCKS AND KNEES   • Schatzki's ring     SOME TROUBLE SWALLOWING OCCASIONALLY, LAST EGD 5/2022   • Spinal stenosis    • Stage 3 chronic kidney disease (HCC)          Current Outpatient Medications:   •  aspirin 81 MG EC tablet, Take 1 tablet by mouth Daily., Disp: , Rfl:   •   betamethasone dipropionate (DIPROLENE) 0.05 % lotion, APPLY  TOPICALLY TO THE APPROPRIATE AREA AS DIRECTED TWICE DAILY (Patient taking differently: Apply 1 application topically to the appropriate area as directed 2 (Two) Times a Day As Needed.), Disp: 60 mL, Rfl: 2  •  clobetasol (TEMOVATE) 0.05 % cream, Apply 1 application topically to the appropriate area as directed 2 (Two) Times a Day As Needed. Indications: Inflammation, Itching, Disp: , Rfl:   •  dicyclomine (BENTYL) 10 MG capsule, Take 10 mg by mouth 3 (Three) Times a Day Before Meals., Disp: , Rfl:   •  ferrous gluconate (FERGON) 324 MG tablet, Take 1 tablet by mouth Daily With Breakfast., Disp: , Rfl:   •  finasteride (PROSCAR) 5 MG tablet, TAKE 1 TABLET EVERY DAY (Patient taking differently: Take 5 mg by mouth Daily.), Disp: 90 tablet, Rfl: 1  •  ketoconazole (NIZORAL) 2 % cream, Apply 1 application topically to the appropriate area as directed Daily As Needed., Disp: , Rfl:   •  lisinopril (PRINIVIL,ZESTRIL) 10 MG tablet, Take 1 tablet by mouth 2 (Two) Times a Day., Disp: 90 tablet, Rfl: 3  •  Olive Leaf Extract 250 MG capsule, Take 1 capsule by mouth Daily. PT TO HOLD FOR SURGERY, Disp: , Rfl:   •  Omega-3 Krill Oil 1000 MG capsule, Take 1,000 mg by mouth Daily. PT HOLDING FOR SURGERY, Disp: , Rfl:   •  pantoprazole (PROTONIX) 40 MG EC tablet, Take 1 tablet by mouth Daily., Disp: 90 tablet, Rfl: 3    Allergies   Allergen Reactions   • Niacin Dizziness   • Spironolactone Nausea Only     Other reaction(s): Headache   • Statins Myalgia       Social History     Socioeconomic History   • Marital status:    Tobacco Use   • Smoking status: Passive Smoke Exposure - Never Smoker     Passive exposure: Yes   • Smokeless tobacco: Never   • Tobacco comments:     OCCASIONAL CIGAR     HX PIPE USE BUT STOPPED OVER 25 YEARS AGO   Vaping Use   • Vaping Use: Never used   Substance and Sexual Activity   • Alcohol use: No   • Drug use: No   • Sexual activity: Not  Currently     Partners: Female     Birth control/protection: None       Family History   Problem Relation Age of Onset   • Thyroid cancer Mother    • Cancer Father    • Stroke Father    • Arthritis Sister    • Arthritis Sister    • Malig Hyperthermia Neg Hx        Review of Systems   Constitutional: Negative.    HENT: Positive for trouble swallowing.    Respiratory: Negative.    Cardiovascular: Negative.    Gastrointestinal: Negative.    Musculoskeletal: Negative.    Skin: Negative.    Hematological: Negative.        Vitals:    02/21/23 0938   BP: 177/92   Pulse: 73   Temp: 96.3 °F (35.7 °C)   SpO2: 95%       Physical Exam  Vitals reviewed.   Constitutional:       Appearance: Normal appearance. He is well-developed.   HENT:      Head: Normocephalic and atraumatic.   Eyes:      General: No scleral icterus.     Pupils: Pupils are equal, round, and reactive to light.   Pulmonary:      Effort: Pulmonary effort is normal. No respiratory distress.      Breath sounds: Normal breath sounds.   Abdominal:      General: Bowel sounds are normal. There is no distension.      Palpations: Abdomen is soft. There is no mass.      Tenderness: There is no abdominal tenderness.      Hernia: No hernia is present.   Skin:     General: Skin is warm and dry.      Coloration: Skin is not jaundiced.      Findings: No rash.   Neurological:      General: No focal deficit present.      Mental Status: He is alert and oriented to person, place, and time.      Cranial Nerves: No cranial nerve deficit.   Psychiatric:         Behavior: Behavior normal.         Thought Content: Thought content normal.         Judgment: Judgment normal.         Admission on 02/15/2023, Discharged on 02/15/2023   Component Date Value Ref Range Status   • Rapid Strep A Screen 02/15/2023 Negative  Negative, VALID, INVALID, Not Performed Final   • Internal Control 02/15/2023 Passed  Passed Final   • Lot Number 02/15/2023 [id number]   Final   • Expiration Date 02/15/2023  [id number]   Final   • COVID19 02/15/2023 Not Detected   Final   • Influenza A Antigen BEKAH 02/15/2023 Not Detected   Final   • Influenza B Antigen BEKAH 02/15/2023 Not Detected   Final   • Internal Control 02/15/2023 Passed   Final   • Lot Number 02/15/2023 [id number]   Final   • Expiration Date 02/15/2023 06/01/23   Final   • Beta Strep Gp A Culture 02/15/2023 Negative   Final   Office Visit on 01/11/2023   Component Date Value Ref Range Status   • Glucose 01/11/2023 98  65 - 99 mg/dL Final   • BUN 01/11/2023 27 (H)  8 - 23 mg/dL Final   • Creatinine 01/11/2023 1.41 (H)  0.76 - 1.27 mg/dL Final   • EGFR Result 01/11/2023 50.4 (L)  >60.0 mL/min/1.73 Final   • BUN/Creatinine Ratio 01/11/2023 19.1  7.0 - 25.0 Final   • Sodium 01/11/2023 143  136 - 145 mmol/L Final   • Potassium 01/11/2023 4.1  3.5 - 5.2 mmol/L Final   • Chloride 01/11/2023 107  98 - 107 mmol/L Final   • Total CO2 01/11/2023 26.0  22.0 - 29.0 mmol/L Final   • Calcium 01/11/2023 8.3 (L)  8.6 - 10.5 mg/dL Final   • Total Protein 01/11/2023 5.9 (L)  6.0 - 8.5 g/dL Final   • Albumin 01/11/2023 3.7  3.5 - 5.2 g/dL Final   • Globulin 01/11/2023 2.2  gm/dL Final   • A/G Ratio 01/11/2023 1.7  g/dL Final   • Total Bilirubin 01/11/2023 0.3  0.0 - 1.2 mg/dL Final   • Alkaline Phosphatase 01/11/2023 127 (H)  39 - 117 U/L Final   • AST (SGOT) 01/11/2023 15  1 - 40 U/L Final   • ALT (SGPT) 01/11/2023 10  1 - 41 U/L Final   • WBC 01/11/2023 6.77  3.40 - 10.80 10*3/mm3 Final   • RBC 01/11/2023 4.23  4.14 - 5.80 10*6/mm3 Final   • Hemoglobin 01/11/2023 11.6 (L)  13.0 - 17.7 g/dL Final   • Hematocrit 01/11/2023 35.6 (L)  37.5 - 51.0 % Final   • MCV 01/11/2023 84.2  79.0 - 97.0 fL Final   • MCH 01/11/2023 27.4  26.6 - 33.0 pg Final   • MCHC 01/11/2023 32.6  31.5 - 35.7 g/dL Final   • RDW 01/11/2023 13.3  12.3 - 15.4 % Final   • Platelets 01/11/2023 230  140 - 450 10*3/mm3 Final   • Neutrophil Rel % 01/11/2023 77.9 (H)  42.7 - 76.0 % Final   • Lymphocyte Rel % 01/11/2023 11.5  (L)  19.6 - 45.3 % Final   • Monocyte Rel % 01/11/2023 8.3  5.0 - 12.0 % Final   • Eosinophil Rel % 01/11/2023 1.5  0.3 - 6.2 % Final   • Basophil Rel % 01/11/2023 0.4  0.0 - 1.5 % Final   • Neutrophils Absolute 01/11/2023 5.27  1.70 - 7.00 10*3/mm3 Final   • Lymphocytes Absolute 01/11/2023 0.78  0.70 - 3.10 10*3/mm3 Final   • Monocytes Absolute 01/11/2023 0.56  0.10 - 0.90 10*3/mm3 Final   • Eosinophils Absolute 01/11/2023 0.10  0.00 - 0.40 10*3/mm3 Final   • Basophils Absolute 01/11/2023 0.03  0.00 - 0.20 10*3/mm3 Final   • Immature Granulocyte Rel % 01/11/2023 0.4  0.0 - 0.5 % Final   • Immature Grans Absolute 01/11/2023 0.03  0.00 - 0.05 10*3/mm3 Final   • nRBC 01/11/2023 0.0  0.0 - 0.2 /100 WBC Final       Diagnoses and all orders for this visit:    1. Achalasia (Primary)  -     Case Request; Standing  -     Implement Anesthesia orders day of procedure.; Standing  -     Obtain informed consent; Standing  -     Case Request    2. Esophageal dysphagia      Patient 82-year-old male with history of hypertension, GERD, AVMs presenting with dysphagia.  Patient with esophagram showing presbyesophagus and failure of the esophagus to empty.  EGD with nonobstructing Schatzki's ring.  Attempt to get manometry so far has failed due to dysfunctional equipment awaiting delivery.  At this point patient with ongoing symptoms would recommend proceeding with trial of achalasia treatment at the GE junction with Botox.  We will follow response to medication for further recommendations.

## 2023-02-21 NOTE — TELEPHONE ENCOUNTER
OK FOR HUB TO READ  LORY patient via telephone for EGD. Scheduled 3/29/23 with arrival time of 115PM. Prep paperwork mailed to verified address on file. Patient advised arrival time may change based on [facility] guidelines. LORY HANSEN

## 2023-02-24 RX ORDER — AMLODIPINE BESYLATE 5 MG/1
5 TABLET ORAL DAILY
Qty: 90 TABLET | Refills: 3 | Status: SHIPPED | OUTPATIENT
Start: 2023-02-24

## 2023-03-09 ENCOUNTER — OFFICE VISIT (OUTPATIENT)
Dept: CARDIOLOGY | Facility: CLINIC | Age: 83
End: 2023-03-09
Payer: MEDICARE

## 2023-03-09 VITALS
HEIGHT: 69 IN | SYSTOLIC BLOOD PRESSURE: 148 MMHG | WEIGHT: 173 LBS | BODY MASS INDEX: 25.62 KG/M2 | DIASTOLIC BLOOD PRESSURE: 74 MMHG | HEART RATE: 72 BPM

## 2023-03-09 DIAGNOSIS — I10 PRIMARY HYPERTENSION: Primary | ICD-10-CM

## 2023-03-09 PROCEDURE — 3077F SYST BP >= 140 MM HG: CPT | Performed by: INTERNAL MEDICINE

## 2023-03-09 PROCEDURE — 99213 OFFICE O/P EST LOW 20 MIN: CPT | Performed by: INTERNAL MEDICINE

## 2023-03-09 PROCEDURE — 3078F DIAST BP <80 MM HG: CPT | Performed by: INTERNAL MEDICINE

## 2023-03-09 NOTE — PROGRESS NOTES
Subjective:     Encounter Date:03/09/23      Patient ID: Imer Jordan is a 82 y.o. male.    Chief Complaint: Establish care, AI, PAD, AAA, ascending aneurysm  HPI:   This is a 82-year-old man who presents for evaluation.  He has an extensive cardiovascular history.  He has known severe aortic regurgitation by LIN in 2019.  In 2018 he underwent AAA repair with juxtarenal aneurysm repair with a Hemashield gold graft with end and replacement of both hypogastric arteries after hypogastric artery endarterectomy.  Proximal aortic endarterectomy attaching the external iliac artery end-to-side into each limb of the aortobiiliac graft endarterectomy of the SUSSY with reimplantation of Carrel patch, endarterectomy of the left accessory renal artery.  Left and right cardiac catheterization in 2019 showed no evidence of obstructive coronary disease.    All of his above work-up was performed at the clinic clinic.   I met him in April 2022. He complained of dyspnea and fatigue. He was anemic and iron saturation was 5%. I performed a cardiac cath showing severe ostial diagonal CAD which is being treated medically due to lack of angina.  In October 22 he underwent AVR with aortic graft repair.  He had postoperative atrial fibrillation but converted quickly back to normal sinus rhythm.  We elected not to anticoagulate him.     He returns today for follow-up.  He feels great, no complaints.  His blood pressure was elevated but is improved with up titration of lisinopril and addition of amlodipine.    The very pleasant man who is .  He has a good understanding of his medical conditions.  He occasionally smokes cigars.  He does not drink alcohol.  He has a history of CKD stage III    The following portions of the patient's history were reviewed and updated as appropriate: allergies, current medications, past family history, past medical history, past social history, past surgical history and problem list.     REVIEW OF  SYSTEMS:   All systems reviewed.  Pertinent positives identified in HPI.  All other systems are negative.    Past Medical History:   Diagnosis Date   • AAA (abdominal aortic aneurysm) without rupture     2018 REPAIR [facility]   • Anemia    • Arthritis    • Ascending aorta dilation (HCC)     stated in 11- [facility] notes   • AVM (arteriovenous malformation) of colon     PER COLONOSCOPY   • Back pain    • Diverticulitis, colon 09/2012   • Erectile dysfunction    • GERD (gastroesophageal reflux disease)    • GI (gastrointestinal bleed)    • History of anemia     HAD BLOOD TRANSFUSION 5/2022, TAKING IRON   • Hypertension    • Mild renal insufficiency 04/13/2018    OVERVIEW:  Noted 4/13/2018 at [facility] during AAA surgery   • Nonrheumatic aortic valve regurgitation     stated in 11- [facility] notes   • Postoperative atrial fibrillation (HCC) 11/04/2022   • Psoriasis    • Scaling of skin     USES CREAM PRN, BUTTOCKS AND KNEES   • Schatzki's ring     SOME TROUBLE SWALLOWING OCCASIONALLY, LAST EGD 5/2022   • Spinal stenosis    • Stage 3 chronic kidney disease (HCC)        Family History   Problem Relation Age of Onset   • Thyroid cancer Mother    • Cancer Father    • Stroke Father    • Arthritis Sister    • Arthritis Sister    • Malig Hyperthermia Neg Hx        Social History     Socioeconomic History   • Marital status:    Tobacco Use   • Smoking status: Never     Passive exposure: Yes   • Smokeless tobacco: Never   • Tobacco comments:     OCCASIONAL CIGAR     HX PIPE USE BUT STOPPED OVER 25 YEARS AGO   Vaping Use   • Vaping Use: Never used   Substance and Sexual Activity   • Alcohol use: No   • Drug use: No   • Sexual activity: Not Currently     Partners: Female     Birth control/protection: None       Allergies   Allergen Reactions   • Niacin Dizziness   • Spironolactone Nausea Only     Other reaction(s): Headache   • Statins Myalgia       Past Surgical History:    Procedure Laterality Date   • ABDOMINAL AORTIC ANEURYSM REPAIR  2018   • ASCENDING AORTIC ANEURYSM REPAIR W/ MECHANICAL AORTIC VALVE REPLACEMENT N/A 11/02/2022    Procedure: INTRAOPERATIVE LIN; STERNOTOMY; AORTIC VALVE REPLACEMENT; ASCENDING AORTIC ANEURYSM REPAIR; ROOT REDUCTION WITH NEURO MONITORING AND CIRC ARREST; PRP.;  Surgeon: Henrique Louie MD;  Location: [facility] CVOR;  Service: Cardiothoracic;  Laterality: N/A;   • CARDIAC CATHETERIZATION Left 04/21/2022    Procedure: Coronary Angiography;  Surgeon: Magui Vega MD;  Location: [facility] CATH [facility]VASIVE LOC[facility];  Service: Cardiology;  Laterality: Left;   • CARDIAC CATHETERIZATION N/A 04/21/2022    Procedure: Left Heart Cath;  Surgeon: Magui Vega MD;  Location: [facility] CATH [facility]ASIVE LOC[facility];  Service: Cardiology;  Laterality: N/A;   • COLONOSCOPY  2013    negative per pt   • COLONOSCOPY N/A 05/09/2022    Procedure: COLONOSCOPY to terminal ileum with APC;  Surgeon: Gato Mccarthy MD;  Location: [facility] ENDOSCOPY;  Service: Gastroenterology;  Laterality: N/A;  pre - anemia  post - diverticulosis, hemorrhoids, cecal avm,    • ENDOSCOPY N/A 05/09/2022    Procedure: ESOPHAGOGASTRODUODENOSCOPY;  Surgeon: Gato Mccarthy MD;  Location: [facility] ENDOSCOPY;  Service: Gastroenterology;  Laterality: N/A;  pre - anemia  post - hiatal hernia, schatzki ring   • ENDOSCOPY N/A 11/07/2022    Procedure: ESOPHAGOGASTRODUODENOSCOPY with biopsies;  Surgeon: Sobeida Rios MD;  Location: [facility] ENDOSCOPY;  Service: Gastroenterology;  Laterality: N/A;  Pre: dysphagia  Post: schatzkis ring, gastritis, irregular z-line, abnormal motility of esophagus   • RENAL ARTERY ENDARTERECTOMY Bilateral 04/02/2018    Done at [facility] along with aortic patch   • UPPER GASTROINTESTINAL ENDOSCOPY         Procedures     Objective:         PHYSICAL EXAM:  GEN: VSS, no distress,   Eyes: normal sclera, normal lids and lashes  HENT: moist mucus membranes,   Respiratory: CTAB, no  rales or wheezes  CV: RRR, loud blowing 4 out of 6 aortic insufficiency murmur throughout the precordium radiating into the abdomen and carotids, +2 DP and 2+ carotid pulses b/l  GI: NABS, soft,  Nontender, nondistended  MSK: no edema, no scoliosis or kyphosis  Skin: no rash, warm, dry  Heme/Lymph: no bruising or bleeding  Psych: organized thought, normal behavior and affect  Neuro: Cranial nerves grossly intact, Alert and Oriented x 3.         Assessment:          Diagnosis Plan   1. Primary hypertension               Plan:         1.  Severe AI: Status post AVR with aortic root repair/replacement 2022.   2.  CAD: Ostial diagonal disease.  Medically managed.  CCS class I symptoms  3.  Peripheral arterial disease: Extensive AAA repair with iliac end-to-end anastomosis. Repeat imaging in 2022 shows no significant lower extremity disease   4.  CKD stage III: Sees Jason Gómez.  Currently on lisinopril 5 twice daily.  Monitor pressures.  Continue.  5.  Hypertension: Controlled on lisinopril 10 twice daily and amlodipine 5.    Dr. Brizuela and Dr. Louie, thank you very much for referring this kind patient to me. Please call me with any questions or concerns. I will see the patient again in 6 months      Magui Vega MD  03/09/23  [facility]    Outpatient Encounter Medications as of 3/9/2023   Medication Sig Dispense Refill   • amLODIPine (NORVASC) 5 MG tablet Take 1 tablet by mouth Daily. 90 tablet 3   • aspirin 81 MG EC tablet Take 1 tablet by mouth Daily.     • betamethasone dipropionate (DIPROLENE) 0.05 % lotion APPLY  TOPICALLY TO THE APPROPRIATE AREA AS DIRECTED TWICE DAILY (Patient taking differently: Apply 1 application topically to the appropriate area as directed 2 (Two) Times a Day As Needed.) 60 mL 2   • clobetasol (TEMOVATE) 0.05 % cream Apply 1 application topically to the appropriate area as directed 2 (Two) Times a Day As Needed. Indications: Inflammation, Itching     • ferrous gluconate  (FERGON) 324 MG tablet Take 1 tablet by mouth Daily With Breakfast.     • finasteride (PROSCAR) 5 MG tablet TAKE 1 TABLET EVERY DAY (Patient taking differently: Take 1 tablet by mouth Daily.) 90 tablet 1   • ketoconazole (NIZORAL) 2 % cream Apply 1 application topically to the appropriate area as directed Daily As Needed.     • lisinopril (PRINIVIL,ZESTRIL) 10 MG tablet Take 1 tablet by mouth 2 (Two) Times a Day. 90 tablet 3   • Olive Leaf Extract 250 MG capsule Take 1 capsule by mouth Daily. PT TO HOLD FOR SURGERY     • Omega-3 Krill Oil 1000 MG capsule Take 1,000 mg by mouth Daily. PT HOLDING FOR SURGERY     • pantoprazole (PROTONIX) 40 MG EC tablet Take 1 tablet by mouth Daily. 90 tablet 3   • dicyclomine (BENTYL) 10 MG capsule Take 1 capsule by mouth 3 (Three) Times a Day Before Meals.       No facility-administered encounter medications on file as of 3/9/2023.

## 2023-03-27 ENCOUNTER — TELEPHONE (OUTPATIENT)
Dept: GASTROENTEROLOGY | Facility: CLINIC | Age: 83
End: 2023-03-27

## 2023-03-27 NOTE — TELEPHONE ENCOUNTER
Caller: Imer Jordan    Relationship to patient: Self    Best call back number: [phone] (Mobile)    Patient is needing: PT IS CALLING TO HAVE SOMEONE CALL HIM BACK REGARDING CANCELLING HIS EGD. HE IS DOING BETTER, AND DOESN'T WANT TO MOVE FORWARD WITH IT AT THIS TIME. PT DOES WANT SOMEONE TO CALL HIM SO HE KNOWS THIS HAS BEEN CANCELLED.

## 2023-03-28 ENCOUNTER — TELEPHONE (OUTPATIENT)
Dept: GASTROENTEROLOGY | Facility: CLINIC | Age: 83
End: 2023-03-28
Payer: MEDICARE

## 2023-03-28 NOTE — TELEPHONE ENCOUNTER
OK FOR HUB TO READ  CANCELLED PT SCOPE UNTIL FURTHER NOTICE PT ISNT HAVING PROBLEMS. PLACED IN DEPOT W/ PRINCE

## 2023-04-06 RX ORDER — PANTOPRAZOLE SODIUM 40 MG/1
TABLET, DELAYED RELEASE ORAL
Qty: 90 TABLET | Refills: 3 | Status: SHIPPED | OUTPATIENT
Start: 2023-04-06

## 2023-04-06 RX ORDER — LISINOPRIL 10 MG/1
10 TABLET ORAL 2 TIMES DAILY
Qty: 180 TABLET | Refills: 3 | Status: SHIPPED | OUTPATIENT
Start: 2023-04-06

## 2023-04-06 NOTE — TELEPHONE ENCOUNTER
rec'd fax from [facility] to refill Lisinopril     trevor [facility]  4/6/23

## 2023-05-18 DIAGNOSIS — N40.1 BENIGN PROSTATIC HYPERPLASIA WITH LOWER URINARY TRACT SYMPTOMS, SYMPTOM DETAILS UNSPECIFIED: ICD-10-CM

## 2023-05-19 RX ORDER — AMLODIPINE BESYLATE 5 MG/1
5 TABLET ORAL DAILY
Qty: 90 TABLET | Refills: 3 | Status: SHIPPED | OUTPATIENT
Start: 2023-05-19

## 2023-05-19 RX ORDER — FINASTERIDE 5 MG/1
TABLET, FILM COATED ORAL
Qty: 90 TABLET | Refills: 1 | Status: SHIPPED | OUTPATIENT
Start: 2023-05-19

## 2023-07-24 ENCOUNTER — OFFICE VISIT (OUTPATIENT)
Dept: FAMILY MEDICINE CLINIC | Facility: CLINIC | Age: 83
End: 2023-07-24
Payer: MEDICARE

## 2023-07-24 VITALS
HEIGHT: 69 IN | SYSTOLIC BLOOD PRESSURE: 132 MMHG | OXYGEN SATURATION: 96 % | RESPIRATION RATE: 18 BRPM | HEART RATE: 57 BPM | DIASTOLIC BLOOD PRESSURE: 70 MMHG | WEIGHT: 174 LBS | BODY MASS INDEX: 25.77 KG/M2

## 2023-07-24 DIAGNOSIS — I10 BENIGN ESSENTIAL HTN: Primary | ICD-10-CM

## 2023-07-24 DIAGNOSIS — Z79.899 HIGH RISK MEDICATION USE: ICD-10-CM

## 2023-07-24 PROBLEM — K22.0 ACHALASIA: Status: RESOLVED | Noted: 2023-02-21 | Resolved: 2023-07-24

## 2023-07-24 PROCEDURE — G0439 PPPS, SUBSEQ VISIT: HCPCS | Performed by: FAMILY MEDICINE

## 2023-07-24 PROCEDURE — 1170F FXNL STATUS ASSESSED: CPT | Performed by: FAMILY MEDICINE

## 2023-07-24 PROCEDURE — 1160F RVW MEDS BY RX/DR IN RCRD: CPT | Performed by: FAMILY MEDICINE

## 2023-07-24 PROCEDURE — 3075F SYST BP GE 130 - 139MM HG: CPT | Performed by: FAMILY MEDICINE

## 2023-07-24 PROCEDURE — 1159F MED LIST DOCD IN RCRD: CPT | Performed by: FAMILY MEDICINE

## 2023-07-24 PROCEDURE — 99212 OFFICE O/P EST SF 10 MIN: CPT | Performed by: FAMILY MEDICINE

## 2023-07-24 PROCEDURE — 96160 PT-FOCUSED HLTH RISK ASSMT: CPT | Performed by: FAMILY MEDICINE

## 2023-07-24 PROCEDURE — 3078F DIAST BP <80 MM HG: CPT | Performed by: FAMILY MEDICINE

## 2023-07-24 NOTE — PROGRESS NOTES
"Assessment and Plan  {Problem List  Visit Diagnosis  Prob List Tab  Medications  SmartSets  BestPractice  CC  Quality :23}   Patient Instructions    Problem List Items Addressed This Visit    None         No follow-ups on file.    {Review (Popup)  Instructions  Charge Capture  Follow-up  Communications :23}  {Health Maintenance Check  Quality :23}    Imer is a 82 y.o. being seen today for  No chief complaint on file. {Chief Complaint :23}  Subjective   HPI  Imer Jordan 82 y.o. male who presents for yearly preventive exam.  He exercises {EXERCISE TYPE:11591}.  {History; colonoscopy (Optional):98134}  Immunizations: {Immunization status:79366}  {Psa hx:14842} He {HAS/HAS NO:82663} increased risk of prostate cancer  He {DOES/DOES NOT:76469::\"does\"} see his dentist regularly  His diet is {Desc; diets:85604}  He describes his alcohol intake as {history; alcohol:51080::\"social drinker\"}  His cardiovascular risk is: {cvs risk:65017}  {This patient has ever been tested for HepC (Optional):43562::\"yes\"}   Social History  He  reports that he has never smoked. He has been exposed to tobacco smoke. He has never used smokeless tobacco. He reports that he does not drink alcohol and does not use drugs.  Objective   Vital Signs        BP Readings from Last 1 Encounters:   03/09/23 148/74     Wt Readings from Last 3 Encounters:   03/09/23 78.5 kg (173 lb)   02/21/23 83.3 kg (183 lb 9.6 oz)   02/15/23 81.6 kg (180 lb)   There is no height or weight on file to calculate BMI.   Physical Exam  {BMI is >= 25 and <30. (Overweight) The following options were offered after discussion; (Optional):42297}     {Care Everywhere Tab OPT:75571}  {The following data was reviewed by (Optional):67722}  {SnapShot  Notes  Encounters  Result Review  Labs  Imaging  Meds  Media :23}      Patient was given instructions and counseling regarding his condition or for health maintenance advice. Please see specific information pulled " into the AVS if appropriate.

## 2023-07-24 NOTE — PROGRESS NOTES
"Subsequent Medicare Wellness Visit     Imer Jordan is a 82 y.o. male who presents for a Subsequent Medicare Wellness Visit.    Compared to one year ago, the patient feels his physical health is better and his mental health is the same.  Recent Hospitalizations:  This patient has had a [facility] admission record on file within the last 365 days.  Current Medical Providers:  Patient Care Team:  Ann Marie Brizuela MD as PCP - Camila Pastor APRN (Nurse Practitioner)  No opioid medication identified on active medication list. I have reviewed chart for other potential  high risk medication/s and harmful drug interactions in the elderly.  Aspirin is on active medication list. Aspirin use is indicated based on review of current medical condition/s. Benefits of this medication outweigh potential harm.  Patient has been encouraged to continue taking this medication.  .    Advance Care Planning   Advance Directive is on file.  ACP discussion was held with the patient during this visit. Patient has an advance directive in EMR which is still valid.      Estimated body mass index is 25.7 kg/m² as calculated from the following:    Height as of this encounter: 175.3 cm (69\").    Weight as of this encounter: 78.9 kg (174 lb).  BMI is >= 25 and <30. (Overweight) The following options were offered after discussion;: His weight is fine.      Does the patient have evidence of cognitive impairment? No  HEALTH RISK ASSESSMENT  Smoking Status:  Social History     Tobacco Use   Smoking Status Passive Smoke Exposure - Never Smoker    Passive exposure: Yes   Smokeless Tobacco Never   Tobacco Comments    OCCASIONAL CIGAR    HX PIPE USE BUT STOPPED OVER 25 YEARS AGO     Alcohol Consumption:  Social History     Substance and Sexual Activity   Alcohol Use No     Fall Risk Screen:  STEADI Fall Risk Assessment was completed, and patient is at LOW risk for falls.Assessment completed on:7/24/2023  Depression " Screenin/24/2023    10:49 AM   PHQ-2/PHQ-9 Depression Screening   Little Interest or Pleasure in Doing Things 0-->not at all   Feeling Down, Depressed or Hopeless 0-->not at all   PHQ-9: Brief Depression Severity Measure Score 0     Health Habits and Functional and Cognitive Screenin/24/2023    10:54 AM   Functional & Cognitive Status   Do you have difficulty preparing food and eating? No   Do you have difficulty bathing yourself, getting dressed or grooming yourself? No   Do you have difficulty using the toilet? No   Do you have difficulty moving around from place to place? No   Do you have trouble with steps or getting out of a bed or a chair? No   Current Diet Well Balanced Diet   Dental Exam Not up to date   Eye Exam Up to date   Exercise (times per week) 5 times per week   Current Exercises Include Walking   Do you need help using the phone?  No   Are you deaf or do you have serious difficulty hearing?  No   Do you need help to go to places out of walking distance? No   Do you need help shopping? No   Do you need help preparing meals?  No   Do you need help with housework?  No   Do you need help with laundry? No   Do you need help taking your medications? No   Do you need help managing money? No   Do you ever drive or ride in a car without wearing a seat belt? No   Have you felt unusual stress, anger or loneliness in the last month? No   Who do you live with? Spouse   If you need help, do you have trouble finding someone available to you? No   Have you been bothered in the last four weeks by sexual problems? No   Do you have difficulty concentrating, remembering or making decisions? No       Health Habits  Current Diet: Well Balanced Diet  Dental Exam: Not up to date  Eye Exam: Up to date  Exercise (times per week): 5 times per week  Current Exercises Include: Walking  Age-appropriate Screening Schedule:  Refer to the list below for future screening recommendations based on patient's age, sex  and/or medical conditions. Orders for these recommended tests are listed in the plan section. The patient has been provided with a written plan.  Health Maintenance   Topic Date Due    ZOSTER VACCINE (2 of 3) 06/26/2017    COVID-19 Vaccine (5 - Pfizer series) 09/07/2022    INFLUENZA VACCINE  10/01/2023    LIPID PANEL  10/31/2023    ANNUAL WELLNESS VISIT  07/24/2024    GASTROSCOPY (EGD)  11/07/2025    TDAP/TD VACCINES (3 - Td or Tdap) 05/01/2027    COLORECTAL CANCER SCREENING  05/09/2032    Pneumococcal Vaccine 65+  Completed        CMS Preventative Services Quick Reference  Risk Factors Identified During Encounter  Cardiac risk as indicated above.   The above risks/problems have been discussed with the patient.  Follow up actions/plans if indicated are seen below in the Assessment/Plan Section.  Pertinent information has been shared with the patient in the After Visit Summary.  Patient Instructions        Follow Up: Medicare visit in one year  An After Visit Summary and PPPS were given/sent to the patient.    Patient has multiple medical problems which are significant and separately identifiable that require additional work above and beyond the Medicare Wellness Visit. These are not trivial or insignificant and are billed with a 25-modifier  Problem Visit:  Imer is a 82 y.o. also being seen today for HtN,    Subjective   History of the Present Illness   Bps are often low but he is not having any dizziness so he will continue to monitor. Bps have appropriate ranges with his cuff.   Social History  He  reports that he is a non-smoker but has been exposed to tobacco smoke. He has never used smokeless tobacco. He reports that he does not drink alcohol and does not use drugs.  Objective   Vital Signs        BP Readings from Last 1 Encounters:   07/24/23 132/70     Wt Readings from Last 3 Encounters:   07/24/23 78.9 kg (174 lb)   03/09/23 78.5 kg (173 lb)   02/21/23 83.3 kg (183 lb 9.6 oz)   Body mass index is 25.7  kg/m².     Physical Exam  Vitals reviewed.   Constitutional:       Appearance: Normal appearance.   Cardiovascular:      Rate and Rhythm: Normal rate and regular rhythm.      Heart sounds: No murmur heard.  Pulmonary:      Effort: Pulmonary effort is normal.      Breath sounds: Normal breath sounds. No wheezing.   Neurological:      Mental Status: He is alert and oriented to person, place, and time.   Psychiatric:         Mood and Affect: Mood normal.         Behavior: Behavior normal.         Thought Content: Thought content normal.         Judgment: Judgment normal.         Assessment and Plan       Problem List Items Addressed This Visit          Cardiac and Vasculature    Benign essential HTN - Primary    Overview     Gerri 7/24/2023  BP is controlled well. He will recheck in six months. He will continue present medications but he is getting pretty low numbers. If he starts to experience dizziness will need to reduce or stop his amlodipine.  Gerri 1/11/2023  BP is up a little here today but better than where it has been. It is trending down and he is working with his cardiologist. He brings in a list of Bps going back since December. Was having intermittent 170-180 numbers. Now highest is in the 150s and not common. Probably averaging in high 130's.          Relevant Orders    Comprehensive Metabolic Panel    Lipid Panel With LDL / HDL Ratio     Other Visit Diagnoses       High risk medication use        Relevant Orders    CBC (No Diff)                 Patient was given instructions and counseling regarding his condition or for health maintenance advice. Please see specific information pulled into the AVS if appropriate.

## 2023-09-26 PROBLEM — K22.0 ACHALASIA: Status: ACTIVE | Noted: 2023-02-21

## 2023-11-10 DIAGNOSIS — Z98.890 S/P ASCENDING AORTIC ANEURYSM REPAIR: Primary | ICD-10-CM

## 2023-11-10 DIAGNOSIS — Z86.79 S/P ASCENDING AORTIC ANEURYSM REPAIR: Primary | ICD-10-CM

## 2023-11-20 ENCOUNTER — TELEPHONE (OUTPATIENT)
Dept: CARDIAC SURGERY | Facility: CLINIC | Age: 83
End: 2023-11-20
Payer: MEDICARE

## 2023-11-20 NOTE — TELEPHONE ENCOUNTER
Spoke with pt advised [employer] gets pre certs for ct scans and they work on those usually about 1 wks out from DOS. Advised pt that he would need to speak with [employer] and get a continuam of care form to make sure visit are covered. Advised once CT Chest is done that would determine the provider pt would see.

## 2023-12-27 DIAGNOSIS — N40.1 BENIGN PROSTATIC HYPERPLASIA WITH LOWER URINARY TRACT SYMPTOMS, SYMPTOM DETAILS UNSPECIFIED: ICD-10-CM

## 2023-12-27 RX ORDER — FINASTERIDE 5 MG/1
TABLET, FILM COATED ORAL
Qty: 90 TABLET | Refills: 3 | Status: SHIPPED | OUTPATIENT
Start: 2023-12-27

## 2024-01-22 ENCOUNTER — HOSPITAL ENCOUNTER (OUTPATIENT)
Dept: CT IMAGING | Facility: HOSPITAL | Age: 84
Discharge: HOME | End: 2024-01-22
Admitting: THORACIC SURGERY (CARDIOTHORACIC VASCULAR SURGERY)
Payer: MEDICARE

## 2024-01-22 DIAGNOSIS — Z86.79 S/P ASCENDING AORTIC ANEURYSM REPAIR: ICD-10-CM

## 2024-01-22 DIAGNOSIS — Z98.890 S/P ASCENDING AORTIC ANEURYSM REPAIR: ICD-10-CM

## 2024-01-22 PROCEDURE — 71250 CT THORAX DX C-: CPT

## 2024-01-24 RX ORDER — LISINOPRIL 10 MG/1
10 TABLET ORAL 2 TIMES DAILY
Qty: 180 TABLET | Refills: 3 | Status: SHIPPED | OUTPATIENT
Start: 2024-01-24

## 2024-03-11 RX ORDER — AMLODIPINE BESYLATE 5 MG/1
5 TABLET ORAL DAILY
Qty: 90 TABLET | Refills: 3 | Status: SHIPPED | OUTPATIENT
Start: 2024-03-11

## 2024-04-09 ENCOUNTER — OFFICE VISIT (OUTPATIENT)
Dept: CARDIAC SURGERY | Facility: CLINIC | Age: 84
End: 2024-04-09
Payer: MEDICARE

## 2024-04-09 VITALS
TEMPERATURE: 97.8 F | DIASTOLIC BLOOD PRESSURE: 77 MMHG | HEIGHT: 69 IN | SYSTOLIC BLOOD PRESSURE: 127 MMHG | WEIGHT: 173 LBS | RESPIRATION RATE: 18 BRPM | BODY MASS INDEX: 25.62 KG/M2 | HEART RATE: 67 BPM | OXYGEN SATURATION: 95 %

## 2024-04-09 DIAGNOSIS — Z98.890 S/P ASCENDING AORTIC ANEURYSM REPAIR: Primary | ICD-10-CM

## 2024-04-09 DIAGNOSIS — Z86.79 S/P ASCENDING AORTIC ANEURYSM REPAIR: Primary | ICD-10-CM

## 2024-04-09 NOTE — PROGRESS NOTES
4/9/2024      Subjective:      Leigh Gant APRN    Chief Complaint: Follow-up ascending aorta/aortic arch aneurysm repair, root reduction, tissue AVR    History of Present Illness:       Dear Leigh Matamoros APRN and Colleagues,    It was nice to see Imer Jordan in [facility] for follow-up. He is a 83 y.o. male with large ascending aortic aneurysm with involvement of the proximal and mid aortic arch, aortic root dilatation, severe aortic regurgitation, advanced aortic atherosclerosis with calcification, open abdominal aortic aneurysm repair with bilateral iliac artery aneurysm repair in 2018 at [facility], HTN, HLD, CKD stage III, and dysphagia.   His thoracic aortic aneurysm pathology and aortic regurgitation was noted at [facility] from 2009.  He was referred to see Dr. Louie in May 2022.  He subsequently underwent ascending aortic aneurysm repair with 28 mm graft, proximal to mid aortic arch replacement using same graft and Joe graft configuration, tissue AVR (27 mm Magna), aortic root reduction arterioplasty of the noncoronary sinus, and aortic arch endarterectomy and protruding plaque removal by Dr. Louie on 11/2/2022.  He comes in today for routine follow-up for aneurysm surveillence.  The CT of chest without contrast on 1/22/2024 was reviewed.  The aortic root measures 4 cm, ascending aorta measures 3.3-3.4 cm, and DTA measures 3.9 cm.  He has not had a postoperative echocardiogram.  He also reports that he has not had any follow-up on his abdominal aorta in some time.  He reports that he can no longer travel to [facility].  He was last seen by cardiology in March 2023.  He denies shortness of breath, chest/back pain, numbness/tingling/pain of extremities.  No vascular deficiencies or hyperextensible or hypermobile joints are noted on exam.  The patient's family history is negative for aneurysms or dissections, negative for connective tissue disease, and negative for coronary  disease in first degree family members.  His BP today is 127/77.  He occasionally smokes a cigar He is alone for the appt today.    Patient Active Problem List   Diagnosis    Benign essential HTN    Benign prostatic hypertrophy    ED (erectile dysfunction)    Acid reflux    HLD (hyperlipidemia)    Dermatitis seborrheica    Hyperglycemia    Left lumbar radiculopathy    History of AAA (abdominal aortic aneurysm) repair    Nonrheumatic aortic valve regurgitation    Stage 3a chronic kidney disease    Iron deficiency anemia    Postoperative atrial fibrillation    S/P ascending aortic aneurysm repair    S/P AVR (aortic valve replacement)    Achalasia       Past Medical History:   Diagnosis Date    AAA (abdominal aortic aneurysm) without rupture     2018 REPAIR [facility]    Achalasia 02/21/2023    Added automatically from request for surgery [id number]    Aneurysm     Aortic valve insufficiency 01/11/2016    Arthritis     Ascending aorta dilation     stated in 11- [facility] notes    AVM (arteriovenous malformation) of colon     PER COLONOSCOPY    Back pain     Diverticulitis, colon 09/2012    Erectile dysfunction     GERD (gastroesophageal reflux disease)     GI (gastrointestinal bleed)     History of anemia     HAD BLOOD TRANSFUSION 5/2022, TAKING IRON    History of transfusion     Hypertension     Mild renal insufficiency 04/13/2018    OVERVIEW:  Noted 4/13/2018 at [facility] during AAA surgery    Nonrheumatic aortic valve regurgitation     stated in 11- [facility] notes    Postoperative atrial fibrillation 11/04/2022    Psoriasis     Scaling of skin     USES CREAM PRN, BUTTOCKS AND KNEES    Schatzki's ring     SOME TROUBLE SWALLOWING OCCASIONALLY, LAST EGD 5/2022    Spinal stenosis     Stage 3 chronic kidney disease        Past Surgical History:   Procedure Laterality Date    ABDOMINAL AORTIC ANEURYSM REPAIR  2018    AORTIC VALVE REPAIR/REPLACEMENT  11/02/2022    ASCENDING AORTIC  ANEURYSM REPAIR W/ MECHANICAL AORTIC VALVE REPLACEMENT N/A 11/02/2022    Procedure: INTRAOPERATIVE LIN; STERNOTOMY; AORTIC VALVE REPLACEMENT; ASCENDING AORTIC ANEURYSM REPAIR; ROOT REDUCTION WITH NEURO MONITORING AND CIRC ARREST; PRP.;  Surgeon: Henrique Louie MD;  Location: [facility] CVOR;  Service: Cardiothoracic;  Laterality: N/A;    CARDIAC CATHETERIZATION Left 04/21/2022    Procedure: Coronary Angiography;  Surgeon: Magui Vega MD;  Location: [facility] CATH INVASIVE LOCATION;  Service: Cardiology;  Laterality: Left;    CARDIAC CATHETERIZATION N/A 04/21/2022    Procedure: Left Heart Cath;  Surgeon: Magui Vega MD;  Location: [facility] CATH INVASIVE LOCATION;  Service: Cardiology;  Laterality: N/A;    CARDIAC VALVE REPLACEMENT  11/02/2022    COLONOSCOPY  2013    negative per pt    COLONOSCOPY N/A 05/09/2022    Procedure: COLONOSCOPY to terminal ileum with APC;  Surgeon: Gato Mccarthy MD;  Location: [facility] ENDOSCOPY;  Service: Gastroenterology;  Laterality: N/A;  pre - anemia  post - diverticulosis, hemorrhoids, cecal avm,     ENDOSCOPY N/A 05/09/2022    Procedure: ESOPHAGOGASTRODUODENOSCOPY;  Surgeon: Gato Mccarthy MD;  Location: [facility] ENDOSCOPY;  Service: Gastroenterology;  Laterality: N/A;  pre - anemia  post - hiatal hernia, schatzki ring    ENDOSCOPY N/A 11/07/2022    Procedure: ESOPHAGOGASTRODUODENOSCOPY with biopsies;  Surgeon: Sobeida Rios MD;  Location: [facility] ENDOSCOPY;  Service: Gastroenterology;  Laterality: N/A;  Pre: dysphagia  Post: schatzkis ring, gastritis, irregular z-line, abnormal motility of esophagus    RENAL ARTERY ENDARTERECTOMY Bilateral 04/02/2018    Done at [facility] along with aortic patch    UPPER GASTROINTESTINAL ENDOSCOPY         Allergies   Allergen Reactions    Niacin Dizziness    Spironolactone Nausea Only     Other reaction(s): Headache    Statins Myalgia         Current Outpatient Medications:     amLODIPine (NORVASC) 5 MG tablet, TAKE 1 TABLET EVERY  DAY, Disp: 90 tablet, Rfl: 3    aspirin 81 MG EC tablet, Take 1 tablet by mouth Daily., Disp: , Rfl:     betamethasone dipropionate (DIPROLENE) 0.05 % lotion, APPLY  TOPICALLY TO THE APPROPRIATE AREA AS DIRECTED TWICE DAILY (Patient taking differently: Apply 1 application  topically to the appropriate area as directed 2 (Two) Times a Day As Needed.), Disp: 60 mL, Rfl: 2    clobetasol (TEMOVATE) 0.05 % cream, Apply 1 Application topically to the appropriate area as directed 2 (Two) Times a Day As Needed. Indications: Inflammation, Itching, Disp: , Rfl:     ferrous gluconate (FERGON) 324 MG tablet, Take 1 tablet by mouth Daily With Breakfast., Disp: , Rfl:     finasteride (PROSCAR) 5 MG tablet, TAKE 1 TABLET EVERY DAY, Disp: 90 tablet, Rfl: 3    Glucosamine-Chondroit-Vit C-Mn (GLUCOSAMINE CHONDR 1500 COMPLX PO), Take  by mouth., Disp: , Rfl:     ketoconazole (NIZORAL) 2 % cream, Apply 1 Application topically to the appropriate area as directed Daily As Needed., Disp: , Rfl:     lisinopril (PRINIVIL,ZESTRIL) 10 MG tablet, TAKE 1 TABLET TWICE DAILY, Disp: 180 tablet, Rfl: 3    Omega-3 Krill Oil 1000 MG capsule, Take 1,000 mg by mouth Daily. PT HOLDING FOR SURGERY, Disp: , Rfl:     Social History     Socioeconomic History    Marital status:    Tobacco Use    Smoking status: Never     Passive exposure: Yes    Smokeless tobacco: Never    Tobacco comments:     OCCASIONAL CIGAR     HX PIPE USE BUT STOPPED OVER 25 YEARS AGO   Vaping Use    Vaping status: Never Used   Substance and Sexual Activity    Alcohol use: No    Drug use: No    Sexual activity: Not Currently     Partners: Female     Birth control/protection: None       Family History   Problem Relation Age of Onset    Thyroid cancer Mother     Cancer Mother     Cancer Father     Stroke Father     Arthritis Sister     Arthritis Sister     Malig Hyperthermia Neg Hx            Review of Systems:  Review of Systems   Respiratory:  Negative for shortness of breath.     Cardiovascular:  Negative for chest pain, palpitations and leg swelling.   Musculoskeletal:  Positive for arthralgias and back pain.   Neurological:  Negative for dizziness.       Physical Exam:    Vital Signs:  Weight: 78.5 kg (173 lb)   Body mass index is 25.55 kg/m².  Temp: 97.8 °F (36.6 °C)   Heart Rate: 67   BP: 127/77     Physical Exam  Vitals and nursing note reviewed.   Constitutional:       General: He is awake.      Appearance: Normal appearance. He is well-developed and well-groomed.   HENT:      Head: Normocephalic and atraumatic.      Nose: Nose normal.      Mouth/Throat:      Lips: Pink.      Mouth: Mucous membranes are moist.      Pharynx: Uvula midline.   Eyes:      General: Lids are normal. No scleral icterus.     Extraocular Movements: Extraocular movements intact.      Conjunctiva/sclera: Conjunctivae normal.      Pupils: Pupils are equal, round, and reactive to light.   Neck:      Thyroid: No thyroid mass or thyromegaly.      Vascular: Normal carotid pulses. No carotid bruit, hepatojugular reflux or JVD.      Trachea: Trachea normal.   Cardiovascular:      Rate and Rhythm: Normal rate and regular rhythm.      Pulses:           Carotid pulses are 2+ on the right side and 2+ on the left side.       Radial pulses are 2+ on the right side and 2+ on the left side.        Femoral pulses are 2+ on the right side and 2+ on the left side.       Popliteal pulses are 2+ on the right side and 2+ on the left side.        Dorsalis pedis pulses are 2+ on the right side and 2+ on the left side.        Posterior tibial pulses are 2+ on the right side and 2+ on the left side.      Heart sounds: Normal heart sounds. No murmur heard.  Pulmonary:      Effort: Pulmonary effort is normal.      Breath sounds: Normal breath sounds.   Abdominal:      General: Abdomen is protuberant. Bowel sounds are normal. There is no distension.      Palpations: Abdomen is soft.      Tenderness: There is no abdominal tenderness.    Musculoskeletal:      Cervical back: Neck supple.      Right lower leg: No edema.      Left lower leg: No edema.      Comments: Gait steady and strong without use of assistive devices   Lymphadenopathy:      Cervical: No cervical adenopathy.      Upper Body:      Right upper body: No supraclavicular adenopathy.      Left upper body: No supraclavicular adenopathy.   Skin:     General: Skin is warm and dry.      Capillary Refill: Capillary refill takes less than 2 seconds.      Findings: No erythema or rash.      Nails: There is no clubbing.             Comments: Sternotomy well healed.   Neurological:      Mental Status: He is alert and oriented to person, place, and time.      GCS: GCS eye subscore is 4. GCS verbal subscore is 5. GCS motor subscore is 6.   Psychiatric:         Attention and Perception: Attention and perception normal.         Mood and Affect: Mood and affect normal.         Speech: Speech normal.         Behavior: Behavior normal. Behavior is cooperative.         Thought Content: Thought content normal.         Cognition and Memory: Cognition and memory normal.         Judgment: Judgment normal.          Assessment:     Ascending aortic aneurysm with involvement of the proximal and mid aortic arch, aortic root dilatation, severe aortic regurgitation--s/p ascending aortic aneurysm repair with 28 mm graft, proximal to mid aortic arch replacement using same graft and Jeo graft configuration, tissue AVR (27 mm Magna), aortic root reduction arterioplasty of the noncoronary sinus, and aortic arch endarterectomy and protruding plaque removal (Liban, 11/2022)--stable  Advanced aortic atherosclerosis with calcification and protruding plaque--as above  Juxtarenal abdominal aortic aneurysm (5.6 cm) and bilateral common iliac artery aneurysms--s/p open repair March 2018 ([facility]), no recent follow-up  HTN--stable on amlodipine/ lisinopril  HLD--statin  CKD stage III--followed by Dr. Gómez  7.    Tobacco abuse, cigars--cessation d/w pt    Recommendation/Plan:     Continued risk factor modification of hypertension with a goal blood pressure less than 130/80, hyperlipidemia optimization, and continued avoidance of tobacco/nicotine products.    We discussed the importance of avoidance of over the counter decongestants and stimulants, specifically pseudoephedrine, for hypertension and aneurysm management.    Initiation of low aerobic exercise is indicated to help reduce body habitus, assist with blood pressure and cholesterol control.       Although risk of rupture is low, emergency department presentation is warranted for acute chest, back, or abdominal pain, syncope, or stroke like symptoms.    Follow up in [facility] Clinic in 1 year(s) with CT scan of chest/abdomen/pelvis without contrast and echocardiogram.  Patient is not following up at [facility] at this time.  He declined referral to vascular surgery for abdominal aortic surveillance.  We agreed that I would check a CTof  abdomen and pelvis and if grafting was stable we would hold on [facility] surgery consultation locally.    I spent approximately 33 minutes total in evaluating the data, examining the patient, discussing the options and counseling.  Independent interpretation of imaging.  Imaging shown to pt.     Thank you for allowing us to participate in his care.    Regards,    Sania St, APRN      **All problems and history are new to this examiner.  Extensive review of records prior to and during patient visit

## 2024-07-17 ENCOUNTER — TELEPHONE (OUTPATIENT)
Dept: CARDIAC SURGERY | Facility: CLINIC | Age: 84
End: 2024-07-17

## 2024-07-17 NOTE — TELEPHONE ENCOUNTER
Caller: Imer Jordan    Relationship: Self    Best call back number: [phone]    What orders are you requesting (i.e. lab or imaging): CT ANGIO CT CHEST & ECHO    In what timeframe would the patient need to come in: ASAP    Where will you receive your lab/imaging services:     Additional notes: PT PCP WANTS TO MOVE UP APPT FOR TESTING AND FOLLOW UP TO ASAP

## 2024-07-18 ENCOUNTER — TELEPHONE (OUTPATIENT)
Dept: CARDIAC SURGERY | Facility: CLINIC | Age: 84
End: 2024-07-18
Payer: MEDICARE

## 2024-07-18 NOTE — TELEPHONE ENCOUNTER
----- Message from [facility] sent at 7/18/2024  2:02 PM EDT -----  Regarding: Testing scheduled for April 2025  Contact: [phone]  I saw my PCP (Leigh Gant) on 7/17/2024 for check up and a couple of issues. One issue being light headed episodes and passing out on one occasion. She requested that the testing scheduled for next April be done now to see if there might be some issues causing this to happen. I contacted scheduling and had these rescheduled for August 1, 2024.  Just trying to keep you informed. Advise if any issues or questions.    Thanks MOI Jordan

## 2024-07-18 NOTE — TELEPHONE ENCOUNTER
Reviewed with Sania ALCAZAR. Per Sania ALCAZAR she recommends patient discuss with cardiology  as his symptoms could be related to blood pressure.     Discussed recommendation with Nikki. He verbalized understanding and this was agreeable.

## 2024-07-23 DIAGNOSIS — Z98.890 H/O AORTIC VALVE REPAIR: Primary | ICD-10-CM

## 2024-07-23 DIAGNOSIS — Z86.79 H/O AORTIC VALVE REPAIR: Primary | ICD-10-CM

## 2024-08-01 ENCOUNTER — HOSPITAL ENCOUNTER (OUTPATIENT)
Facility: HOSPITAL | Age: 84
Discharge: HOME | End: 2024-08-01
Payer: MEDICARE

## 2024-08-01 ENCOUNTER — HOSPITAL ENCOUNTER (OUTPATIENT)
Dept: CARDIOLOGY | Facility: HOSPITAL | Age: 84
Discharge: HOME | End: 2024-08-01
Payer: MEDICARE

## 2024-08-01 VITALS
BODY MASS INDEX: 25.62 KG/M2 | DIASTOLIC BLOOD PRESSURE: 72 MMHG | SYSTOLIC BLOOD PRESSURE: 130 MMHG | HEIGHT: 69 IN | OXYGEN SATURATION: 98 % | HEART RATE: 66 BPM | WEIGHT: 173 LBS

## 2024-08-01 DIAGNOSIS — Z98.890 S/P ASCENDING AORTIC ANEURYSM REPAIR: ICD-10-CM

## 2024-08-01 DIAGNOSIS — Z86.79 S/P ASCENDING AORTIC ANEURYSM REPAIR: ICD-10-CM

## 2024-08-01 LAB
AORTIC ARCH: 3.4 CM
AORTIC DIMENSIONLESS INDEX: 0.6 (DI)
ASCENDING AORTA: 3.1 CM
BH CV ECHO AV AORTIC VALVE AT ACCEL TIME CALCULATED: 80 MSEC
BH CV ECHO MEAS - ACS: 1.57 CM
BH CV ECHO MEAS - AO MAX PG: 11.7 MMHG
BH CV ECHO MEAS - AO MEAN PG: 6.4 MMHG
BH CV ECHO MEAS - AO ROOT DIAM: 4.5 CM
BH CV ECHO MEAS - AO V2 MAX: 171.3 CM/SEC
BH CV ECHO MEAS - AO V2 VTI: 32 CM
BH CV ECHO MEAS - AT: 0.08 SEC
BH CV ECHO MEAS - AVA(I,D): 2.09 CM2
BH CV ECHO MEAS - EDV(CUBED): 85.2 ML
BH CV ECHO MEAS - EDV(MOD-SP2): 51 ML
BH CV ECHO MEAS - EDV(MOD-SP4): 54 ML
BH CV ECHO MEAS - EF(MOD-BP): 66.4 %
BH CV ECHO MEAS - EF(MOD-SP2): 64.7 %
BH CV ECHO MEAS - EF(MOD-SP4): 66.7 %
BH CV ECHO MEAS - ESV(CUBED): 25.3 ML
BH CV ECHO MEAS - ESV(MOD-SP2): 18 ML
BH CV ECHO MEAS - ESV(MOD-SP4): 18 ML
BH CV ECHO MEAS - FS: 33.3 %
BH CV ECHO MEAS - IVS/LVPW: 1 CM
BH CV ECHO MEAS - IVSD: 1.3 CM
BH CV ECHO MEAS - LAT PEAK E' VEL: 5.4 CM/SEC
BH CV ECHO MEAS - LV DIASTOLIC VOL/BSA (35-75): 27.8 CM2
BH CV ECHO MEAS - LV MASS(C)D: 215.1 GRAMS
BH CV ECHO MEAS - LV MAX PG: 2.8 MMHG
BH CV ECHO MEAS - LV MEAN PG: 1.54 MMHG
BH CV ECHO MEAS - LV SYSTOLIC VOL/BSA (12-30): 9.3 CM2
BH CV ECHO MEAS - LV V1 MAX: 83.9 CM/SEC
BH CV ECHO MEAS - LV V1 VTI: 15.6 CM
BH CV ECHO MEAS - LVIDD: 4.4 CM
BH CV ECHO MEAS - LVIDS: 2.9 CM
BH CV ECHO MEAS - LVOT AREA: 4.3 CM2
BH CV ECHO MEAS - LVOT DIAM: 2.33 CM
BH CV ECHO MEAS - LVPWD: 1.3 CM
BH CV ECHO MEAS - MED PEAK E' VEL: 4.5 CM/SEC
BH CV ECHO MEAS - MR MAX PG: 105.1 MMHG
BH CV ECHO MEAS - MR MAX VEL: 512.5 CM/SEC
BH CV ECHO MEAS - MV A DUR: 0.09 SEC
BH CV ECHO MEAS - MV A MAX VEL: 73.3 CM/SEC
BH CV ECHO MEAS - MV DEC SLOPE: 321.5 CM/SEC2
BH CV ECHO MEAS - MV DEC TIME: 0.26 SEC
BH CV ECHO MEAS - MV E MAX VEL: 57.4 CM/SEC
BH CV ECHO MEAS - MV E/A: 0.78
BH CV ECHO MEAS - MV MAX PG: 5.5 MMHG
BH CV ECHO MEAS - MV MEAN PG: 1.52 MMHG
BH CV ECHO MEAS - MV P1/2T: 63.8 MSEC
BH CV ECHO MEAS - MV V2 VTI: 30.2 CM
BH CV ECHO MEAS - MVA(P1/2T): 3.5 CM2
BH CV ECHO MEAS - MVA(VTI): 2.21 CM2
BH CV ECHO MEAS - PA ACC TIME: 0.08 SEC
BH CV ECHO MEAS - PA V2 MAX: 90.9 CM/SEC
BH CV ECHO MEAS - PULM A REVS DUR: 0.11 SEC
BH CV ECHO MEAS - PULM A REVS VEL: 88.1 CM/SEC
BH CV ECHO MEAS - PULM DIAS VEL: 48.6 CM/SEC
BH CV ECHO MEAS - PULM S/D: 0.93
BH CV ECHO MEAS - PULM SYS VEL: 45.2 CM/SEC
BH CV ECHO MEAS - QP/QS: 0.65
BH CV ECHO MEAS - RAP SYSTOLE: 3 MMHG
BH CV ECHO MEAS - RV MAX PG: 1.95 MMHG
BH CV ECHO MEAS - RV V1 MAX: 69.8 CM/SEC
BH CV ECHO MEAS - RV V1 VTI: 11.2 CM
BH CV ECHO MEAS - RVOT DIAM: 2.22 CM
BH CV ECHO MEAS - RVSP: 23.6 MMHG
BH CV ECHO MEAS - SV(LVOT): 66.8 ML
BH CV ECHO MEAS - SV(MOD-SP2): 33 ML
BH CV ECHO MEAS - SV(MOD-SP4): 36 ML
BH CV ECHO MEAS - SV(RVOT): 43.6 ML
BH CV ECHO MEAS - SVI(LVOT): 34.4 ML/M2
BH CV ECHO MEAS - SVI(MOD-SP2): 17 ML/M2
BH CV ECHO MEAS - SVI(MOD-SP4): 18.5 ML/M2
BH CV ECHO MEAS - TAPSE (>1.6): 1.7 CM
BH CV ECHO MEAS - TR MAX PG: 20.6 MMHG
BH CV ECHO MEAS - TR MAX VEL: 226.7 CM/SEC
BH CV ECHO MEASUREMENTS AVERAGE E/E' RATIO: 11.6
BH CV VAS BP LEFT ARM: NORMAL MMHG
BH CV XLRA - RV BASE: 2.6 CM
BH CV XLRA - RV LENGTH: 7.2 CM
BH CV XLRA - RV MID: 2.33 CM
BH CV XLRA - TDI S': 9.1 CM/SEC
LEFT ATRIUM VOLUME INDEX: 20.7 ML/M2
SINUS: 4.8 CM
STJ: 4.3 CM

## 2024-08-01 PROCEDURE — 74176 CT ABD & PELVIS W/O CONTRAST: CPT

## 2024-08-01 PROCEDURE — 71250 CT THORAX DX C-: CPT

## 2024-08-01 PROCEDURE — 93306 TTE W/DOPPLER COMPLETE: CPT

## 2024-08-05 ENCOUNTER — TELEPHONE (OUTPATIENT)
Dept: CARDIAC SURGERY | Facility: CLINIC | Age: 84
End: 2024-08-05
Payer: MEDICARE

## 2024-08-05 NOTE — TELEPHONE ENCOUNTER
Notified patient that CT chest/abdomen/pelvis and echo did not show any concerning changes related to his aorta or heart valves. Recommended follow up with repeat testing in 1 year. Appointment scheduled for 9/2/25 with INGE Lui. Patient will get results to PCP for review.

## 2024-10-16 DIAGNOSIS — N40.1 BENIGN PROSTATIC HYPERPLASIA WITH LOWER URINARY TRACT SYMPTOMS, SYMPTOM DETAILS UNSPECIFIED: ICD-10-CM

## 2024-10-16 RX ORDER — FINASTERIDE 5 MG/1
TABLET, FILM COATED ORAL
Qty: 90 TABLET | Refills: 3 | OUTPATIENT
Start: 2024-10-16

## 2024-10-25 NOTE — ED NOTES
Patient wearing mask, nurse wearing mask, n95, protective eyewear for care and assessment.  Hand hygiene performed prior to and post care.    
n/a

## 2025-01-08 RX ORDER — AMLODIPINE BESYLATE 5 MG/1
5 TABLET ORAL DAILY
Qty: 90 TABLET | Refills: 0 | Status: SHIPPED | OUTPATIENT
Start: 2025-01-08

## 2025-01-08 NOTE — TELEPHONE ENCOUNTER
Pt needs refill on Amlodipine to hold him over until his appt in Feb, 2025  Loretta 30 days no add'l refills and then he will address mailorder refills at his next visit      1-8-25  Jose

## 2025-01-09 RX ORDER — AMLODIPINE BESYLATE 5 MG/1
5 TABLET ORAL DAILY
Qty: 30 TABLET | Refills: 0 | Status: SHIPPED | OUTPATIENT
Start: 2025-01-09

## 2025-02-11 ENCOUNTER — OFFICE VISIT (OUTPATIENT)
Dept: CARDIOLOGY | Facility: CLINIC | Age: 85
End: 2025-02-11
Payer: MEDICARE

## 2025-02-11 VITALS
HEART RATE: 64 BPM | SYSTOLIC BLOOD PRESSURE: 142 MMHG | HEIGHT: 69 IN | BODY MASS INDEX: 26.81 KG/M2 | WEIGHT: 181 LBS | DIASTOLIC BLOOD PRESSURE: 84 MMHG

## 2025-02-11 DIAGNOSIS — E78.5 DYSLIPIDEMIA (HIGH LDL; LOW HDL): Primary | ICD-10-CM

## 2025-02-11 PROCEDURE — 93000 ELECTROCARDIOGRAM COMPLETE: CPT | Performed by: NURSE PRACTITIONER

## 2025-02-11 PROCEDURE — 1159F MED LIST DOCD IN RCRD: CPT | Performed by: NURSE PRACTITIONER

## 2025-02-11 PROCEDURE — 1160F RVW MEDS BY RX/DR IN RCRD: CPT | Performed by: NURSE PRACTITIONER

## 2025-02-11 PROCEDURE — 3079F DIAST BP 80-89 MM HG: CPT | Performed by: NURSE PRACTITIONER

## 2025-02-11 PROCEDURE — 3077F SYST BP >= 140 MM HG: CPT | Performed by: NURSE PRACTITIONER

## 2025-02-11 PROCEDURE — 99214 OFFICE O/P EST MOD 30 MIN: CPT | Performed by: NURSE PRACTITIONER

## 2025-02-11 RX ORDER — EZETIMIBE 10 MG/1
10 TABLET ORAL DAILY
Qty: 90 TABLET | Refills: 3 | Status: SHIPPED | OUTPATIENT
Start: 2025-02-11

## 2025-02-11 NOTE — PROGRESS NOTES
Date of Office Visit: 2025  Encounter Provider: INGE Charles  Place of Service: [facility] CARDIOLOGY  Patient Name: Imer Jordan  :1940    Chief Complaint   Patient presents with    Coronary Artery Disease    Hypertension   :     HPI: Imer Jordan is a 84 y.o. male who is a patient of Dr. Vega and is new to me today.  She has extensive cardiovascular history.He has known severe aortic regurgitation by LIN in 2019.  In  he underwent AAA repair with juxtarenal aneurysm repair with a Hemashield gold graft with end and replacement of both hypogastric arteries after hypogastric artery endarterectomy.  Proximal aortic endarterectomy attaching the external iliac artery end-to-side into each limb of the aortobiiliac graft endarterectomy of the SUSSY with reimplantation of Carrel patch, endarterectomy of the left accessory renal artery.  Left and right cardiac catheterization in  showed no evidence of obstructive coronary disease.     All of his above work-up was performed at an outlying clinic.   I met him in 2022. He complained of dyspnea and fatigue. He was anemic and iron saturation was 5%. I performed a cardiac cath showing severe ostial diagonal CAD which is being treated medically due to lack of angina.  In  he underwent AVR with aortic graft repair.  He had postoperative atrial fibrillation but converted quickly back to normal sinus rhythm.  We elected not to anticoagulate him.     He was last in the office in 2023 occasionally smokes cigars overall was doing well.  He comes in today for follow-up.  Last year he was having some dizzy spells and even had an episode of syncope.  His primary care doctor cut his lisinopril back and this improved.  He is intolerant to statins his last LDL was 131.  He had an echocardiogram in August he showed stable valve function aortic root of 4.5 cm.  His EF is normal at 66%.  He had a CT of the  abdomen and chest all that showed stable aneurysm repair.    Previous testing and notes have been reviewed by me.     August 1, 2024 CT of the abdomen and chest  IMPRESSION:  1. Stable appearance of ascending aortic aneurysm repair and infrarenal  abdominal aortic aneurysm repair  2. Stable mildly dilated aortic arch and descending thoracic aorta  3. 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 Additional findings as described       August 1, 2024 echocardiogram  Interpretation Summary         Left ventricular systolic function is normal. Calculated left ventricular EF = 66.4% Septal wall motion is abnormal, consistent with a post-operative state. Normal left ventricular cavity size noted. Left ventricular wall thickness is consistent with mild concentric hypertrophy. Left ventricular diastolic function is consistent with (grade I) impaired relaxation.    No significant aortic valve regurgitation is present. Aortic valve maximum pressure gradient is 11.7 mmHg. Aortic valve mean pressure gradient is 6.4 mmHg. Aortic valve dimensionless index is 0.60 . There is a 27 mm, porcine, Magna Ease bioprosthetic aortic valve present. The aortic valve peak and mean gradients are within defined limits. The prosthetic aortic valve is grossly normal.    Mild dilation of the aortic root is present. Aortic root = 4.5 cm No dilation of the ascending aorta is present. Aortic arch = 3.4 cm     Past Medical History:   Diagnosis Date    AAA (abdominal aortic aneurysm) without rupture     2018 REPAIR [facility]    Achalasia 02/21/2023    Added automatically from request for surgery [id number]    Aneurysm     Aortic valve insufficiency 01/11/2016    Arthritis     Ascending aorta dilation     stated in 11- [facility] notes    AVM (arteriovenous malformation) of colon     PER COLONOSCOPY    Back pain     Diverticulitis, colon 09/2012    Erectile dysfunction     GERD (gastroesophageal reflux disease)     GI  (gastrointestinal bleed)     History of anemia     HAD BLOOD TRANSFUSION 5/2022, TAKING IRON    History of transfusion     Hypertension     Mild renal insufficiency 04/13/2018    OVERVIEW:  Noted 4/13/2018 at [facility] during AAA surgery    Nonrheumatic aortic valve regurgitation     stated in 11- [facility] notes    Postoperative atrial fibrillation 11/04/2022    Psoriasis     Scaling of skin     USES CREAM PRN, BUTTOCKS AND KNEES    Schatzki's ring     SOME TROUBLE SWALLOWING OCCASIONALLY, LAST EGD 5/2022    Spinal stenosis     Stage 3 chronic kidney disease        Past Surgical History:   Procedure Laterality Date    ABDOMINAL AORTIC ANEURYSM REPAIR  2018    AORTIC VALVE REPAIR/REPLACEMENT  11/02/2022    ASCENDING AORTIC ANEURYSM REPAIR W/ MECHANICAL AORTIC VALVE REPLACEMENT N/A 11/02/2022    Procedure: INTRAOPERATIVE LIN; STERNOTOMY; AORTIC VALVE REPLACEMENT; ASCENDING AORTIC ANEURYSM REPAIR; ROOT REDUCTION WITH NEURO MONITORING AND CIRC ARREST; PRP.;  Surgeon: Henrique Louie MD;  Location: [facility] CVOR;  Service: Cardiothoracic;  Laterality: N/A;    CARDIAC CATHETERIZATION Left 04/21/2022    Procedure: Coronary Angiography;  Surgeon: Magui Vega MD;  Location: [facility] CATH INVASIVE LOCATION;  Service: Cardiology;  Laterality: Left;    CARDIAC CATHETERIZATION N/A 04/21/2022    Procedure: Left Heart Cath;  Surgeon: Magui Vega MD;  Location: [facility] CATH INVASIVE LOCATION;  Service: Cardiology;  Laterality: N/A;    CARDIAC VALVE REPLACEMENT  11/02/2022    COLONOSCOPY  2013    negative per pt    COLONOSCOPY N/A 05/09/2022    Procedure: COLONOSCOPY to terminal ileum with APC;  Surgeon: Gato Mccarthy MD;  Location: [facility] ENDOSCOPY;  Service: Gastroenterology;  Laterality: N/A;  pre - anemia  post - diverticulosis, hemorrhoids, cecal avm,     ENDOSCOPY N/A 05/09/2022    Procedure: ESOPHAGOGASTRODUODENOSCOPY;  Surgeon: Gato Mccarthy MD;  Location: [facility] ENDOSCOPY;  Service:  Gastroenterology;  Laterality: N/A;  pre - anemia  post - hiatal hernia, schatzki ring    ENDOSCOPY N/A 11/07/2022    Procedure: ESOPHAGOGASTRODUODENOSCOPY with biopsies;  Surgeon: Sobeida Rios MD;  Location: [facility] ENDOSCOPY;  Service: Gastroenterology;  Laterality: N/A;  Pre: dysphagia  Post: schatzkis ring, gastritis, irregular z-line, abnormal motility of esophagus    RENAL ARTERY ENDARTERECTOMY Bilateral 04/02/2018    Done at [facility] along with aortic patch    UPPER GASTROINTESTINAL ENDOSCOPY         Social History     Socioeconomic History    Marital status:    Tobacco Use    Smoking status: Never     Passive exposure: Yes    Smokeless tobacco: Never    Tobacco comments:     OCCASIONAL CIGAR     HX PIPE USE BUT STOPPED OVER 25 YEARS AGO   Vaping Use    Vaping status: Never Used   Substance and Sexual Activity    Alcohol use: No    Drug use: No    Sexual activity: Not Currently     Partners: Female     Birth control/protection: None       Family History   Problem Relation Age of Onset    Thyroid cancer Mother     Cancer Mother     Cancer Father     Stroke Father     Arthritis Sister     Arthritis Sister     Malig Hyperthermia Neg Hx        Review of Systems   Constitutional: Negative for diaphoresis and malaise/fatigue.   Cardiovascular:  Negative for chest pain, claudication, dyspnea on exertion, irregular heartbeat, leg swelling, near-syncope, orthopnea, palpitations, paroxysmal nocturnal dyspnea and syncope.   Respiratory:  Negative for cough, shortness of breath and sleep disturbances due to breathing.    Musculoskeletal:  Negative for falls.   Neurological:  Negative for dizziness and weakness.   Psychiatric/Behavioral:  Negative for altered mental status and substance abuse.        Allergies   Allergen Reactions    Niacin Dizziness    Spironolactone Nausea Only     Other reaction(s): Headache    Statins Myalgia         Current Outpatient Medications:     amLODIPine (NORVASC) 5 MG  "tablet, TAKE 1 TABLET EVERY DAY, Disp: 90 tablet, Rfl: 0    aspirin 81 MG EC tablet, Take 1 tablet by mouth Daily., Disp: , Rfl:     betamethasone dipropionate (DIPROLENE) 0.05 % lotion, APPLY  TOPICALLY TO THE APPROPRIATE AREA AS DIRECTED TWICE DAILY (Patient taking differently: Apply 1 application  topically to the appropriate area as directed 2 (Two) Times a Day As Needed.), Disp: 60 mL, Rfl: 2    ferrous gluconate (FERGON) 324 MG tablet, Take 1 tablet by mouth Daily With Breakfast., Disp: , Rfl:     finasteride (PROSCAR) 5 MG tablet, TAKE 1 TABLET EVERY DAY, Disp: 90 tablet, Rfl: 3    ketoconazole (NIZORAL) 2 % cream, Apply 1 Application topically to the appropriate area as directed Daily As Needed., Disp: , Rfl:     lisinopril (PRINIVIL,ZESTRIL) 10 MG tablet, TAKE 1 TABLET TWICE DAILY, Disp: 180 tablet, Rfl: 3    Misc Natural Products (GLUCOSAMINE CHOND COMPLEX/MSM PO), Take 1,500 mg by mouth., Disp: , Rfl:     Omega-3 Krill Oil 1000 MG capsule, Take 1,000 mg by mouth Daily. PT HOLDING FOR SURGERY, Disp: , Rfl:         Objective:     Vitals:    02/11/25 1136   BP: 142/84   BP Location: Left arm   Patient Position: Sitting   Cuff Size: Small Adult   Pulse: 64   Weight: 82.1 kg (181 lb)   Height: 175.3 cm (69\")     Body mass index is 26.73 kg/m².    PHYSICAL EXAM:    Constitutional:       General: Not in acute distress.     Appearance: Normal appearance. Well-developed.   Eyes:      Pupils: Pupils are equal, round, and reactive to light.   HENT:      Head: Normocephalic.   Neck:      Vascular: No carotid bruit or JVD.   Pulmonary:      Effort: Pulmonary effort is normal. No tachypnea.      Breath sounds: Normal breath sounds. No wheezing. No rales.   Cardiovascular:      Normal rate. Regular rhythm.      No gallop.    Pulses:     Intact distal pulses.   Edema:     Peripheral edema absent.   Abdominal:      General: Bowel sounds are normal.      Palpations: Abdomen is soft.      Tenderness: There is no abdominal " tenderness.   Musculoskeletal: Normal range of motion.      Cervical back: Normal range of motion and neck supple. No edema. Skin:     General: Skin is warm and dry.   Neurological:      Mental Status: Alert and oriented to person, place, and time.           ECG 12 Lead    Date/Time: 2/11/2025 12:18 PM  Performed by: Barbi Wells APRN    Authorized by: Barbi Wells APRN  Comparison: compared with previous ECG   Similar to previous ECG  Rhythm: sinus rhythm  Rate: normal  Conduction: left bundle branch block  QRS axis: normal    Clinical impression: abnormal EKG            Assessment/Plan:      1.  Aortic valve replacement-stable valve function on recent echocardiogram.    2.  History of abdominal aortic aneurysm stent graft as well as abdominal aortic root repair.  Stable on recent CT of the chest and abdomen    3. Coronary artery disease-diagonal lesion nonobstructive disease.  Continue aspirin 81 mg daily.  No angina symptoms    4.  Hyperlipidemia-intolerant to statins due to significant myalgias.  Will start Zetia 10 mg daily recheck labs in 3 to 4 months.    Follow-up in a year         Your medication list            Accurate as of February 11, 2025 12:17 PM. If you have any questions, ask your nurse or doctor.                CHANGE how you take these medications        Instructions Last Dose Given Next Dose Due   betamethasone dipropionate 0.05 % lotion  What changed:   how much to take  when to take this  reasons to take this  additional instructions      APPLY  TOPICALLY TO THE APPROPRIATE AREA AS DIRECTED TWICE DAILY              CONTINUE taking these medications        Instructions Last Dose Given Next Dose Due   amLODIPine 5 MG tablet  Commonly known as: NORVASC      TAKE 1 TABLET EVERY DAY       aspirin 81 MG EC tablet      Take 1 tablet by mouth Daily.       ferrous gluconate 324 MG tablet  Commonly known as: FERGON      Take 1 tablet by mouth Daily With Breakfast.       finasteride 5 MG  tablet  Commonly known as: PROSCAR      TAKE 1 TABLET EVERY DAY       GLUCOSAMINE CHOND COMPLEX/MSM PO      Take 1,500 mg by mouth.       ketoconazole 2 % cream  Commonly known as: NIZORAL      Apply 1 Application topically to the appropriate area as directed Daily As Needed.       lisinopril 10 MG tablet  Commonly known as: PRINIVIL,ZESTRIL      TAKE 1 TABLET TWICE DAILY       Omega-3 Krill Oil 1000 MG capsule      Take 1,000 mg by mouth Daily. PT HOLDING FOR SURGERY              STOP taking these medications      GLUCOSAMINE CHONDR 1500 COMPLX PO  Stopped by: Barbi Wells                   As always, it has been a pleasure to participate in your patient's care.      Sincerely,     Barbi ALCAZAR

## 2025-03-25 ENCOUNTER — TELEPHONE (OUTPATIENT)
Dept: CARDIOLOGY | Age: 85
End: 2025-03-25
Payer: MEDICARE

## 2025-03-25 RX ORDER — AMLODIPINE BESYLATE 5 MG/1
5 TABLET ORAL DAILY
Qty: 90 TABLET | Refills: 3 | Status: SHIPPED | OUTPATIENT
Start: 2025-03-25

## 2025-03-25 NOTE — TELEPHONE ENCOUNTER
Pt left a voicemail on Khadra's ext today wanting a call back about medication. Kym can you call?

## 2025-03-27 NOTE — TELEPHONE ENCOUNTER
Called patient and he was just trying to verify if his medication was refilled and if it was sent to the correct pharmacy. /CC 03/27/25

## 2025-03-28 NOTE — TELEPHONE ENCOUNTER
Pt is good on the Amlodipine.  I called him and he is aware of the status regarding rx sent to [facility].    Jose 3/28/25  300pm

## 2025-04-01 ENCOUNTER — TELEPHONE (OUTPATIENT)
Dept: CARDIOLOGY | Age: 85
End: 2025-04-01

## 2025-04-01 NOTE — TELEPHONE ENCOUNTER
Caller: Imer Jordan    Relationship to patient: Self    Best call back number: [phone]    Chief complaint: EKG INDICATING TWO HEART ATTACKS, TWO WEEKS AGO HAD INFREQUENT CHEST PAIN - SHARP PAIN    Type of visit: F/U    Requested date: NEXT AVAIL AS ADVISED     If rescheduling, when is the original appointment: N/A     Additional notes:  PT WENT TO SEE HIS PCP YESTERDAY 3.31.25 FOR A WELLNESS CHECK AND THE PT TOLD HER THAT HE HAS BEEN HAVING CHEST PAIN. AN EKG WAS ORDERED AND THE PCP STATED THAT THE RESULTS INDICATED THAT THE PT HAS HAD TWO HEART ATTACKS    PLS CALL PT FOR MORE DETAIL AND/OR TO SCHEDULE

## 2025-04-01 NOTE — TELEPHONE ENCOUNTER
Pt says that 2 weeks ago he had 2 episodes of sharp CP.  He can't recall what he was doing when the first one occurred.  The second episode happened when he was home.  It didn't last but a minute or so. Pt denied any other accompanying symptoms.    I added pt on for an appt with [facility] this Friday.  When I called the pt, he said he had several printouts of the EKG he was given.  He will bring these in to his appt with him.  I also called PCP office and s/w Obdulia.  I requested they send EKG to our office for our records- to send to Medical Records fax at [phone].    Cheli ZUNIGA RN  [place] [facility] [place]  04/01/25 12:42 EDT

## 2025-04-01 NOTE — TELEPHONE ENCOUNTER
I spoke with Imer Jordan and gave them message from the provider.  They verbalized understanding.    Cheli ZUNIGA RN  [place] [facility] [place]  04/01/25 13:34 EDT

## 2025-04-04 ENCOUNTER — OFFICE VISIT (OUTPATIENT)
Age: 85
End: 2025-04-04
Payer: MEDICARE

## 2025-04-04 VITALS
SYSTOLIC BLOOD PRESSURE: 148 MMHG | DIASTOLIC BLOOD PRESSURE: 80 MMHG | HEIGHT: 69 IN | BODY MASS INDEX: 27.16 KG/M2 | WEIGHT: 183.4 LBS | HEART RATE: 64 BPM

## 2025-04-04 DIAGNOSIS — I44.7 LBBB (LEFT BUNDLE BRANCH BLOCK): Primary | ICD-10-CM

## 2025-04-04 NOTE — PROGRESS NOTES
Subjective:     Encounter Date:04/04/25      Patient ID: Imer Jordan is a 84 y.o. male.    Chief Complaint: Establish care, AI, PAD, AAA, ascending aneurysm  HPI:   This is a 84-year-old man who presents for evaluation.  He has an extensive cardiovascular history.  He has known severe aortic regurgitation by LIN in 2019.  In 2018 he underwent AAA repair with juxtarenal aneurysm repair with a Hemashield gold graft with end and replacement of both hypogastric arteries after hypogastric artery endarterectomy.  Proximal aortic endarterectomy attaching the external iliac artery end-to-side into each limb of the aortobiiliac graft endarterectomy of the SUSSY with reimplantation of Carrel patch, endarterectomy of the left accessory renal artery.  Left and right cardiac catheterization in 2019 showed no evidence of obstructive coronary disease.    All of his above work-up was performed at the [facility].   I met him in April 2022. He complained of dyspnea and fatigue. He was anemic and iron saturation was 5%. I performed a cardiac cath showing severe ostial diagonal CAD which is being treated medically due to lack of angina.  In October 22 he underwent AVR with aortic graft repair.  He had postoperative atrial fibrillation but converted quickly back to normal sinus rhythm.  We elected not to anticoagulate him.     He returns today for urgent follow up. He had an EKG in his PCP office. She was concerned it represented a new anterior infarct pattern due to lack of R wave in V2 and referred him back here. Today his EKG shows a LBBB with normal R wave progression. He has no angina. He can walk the length of 2 football fields with his dogs and climb multiple flights of stairs without angina.     The very pleasant man who is .  He has a good understanding of his medical conditions.  He occasionally smokes cigars.  He does not drink alcohol.  He has a history of CKD stage III    The following portions of the  patient's history were reviewed and updated as appropriate: allergies, current medications, past family history, past medical history, past social history, past surgical history and problem list.     REVIEW OF SYSTEMS:   All systems reviewed.  Pertinent positives identified in HPI.  All other systems are negative.    Past Medical History:   Diagnosis Date    AAA (abdominal aortic aneurysm) without rupture     2018 REPAIR [facility]    Achalasia 02/21/2023    Added automatically from request for surgery [id number]    Aneurysm     Aortic valve insufficiency 01/11/2016    Arthritis     Ascending aorta dilation     stated in 11- [facility] notes    AVM (arteriovenous malformation) of colon     PER COLONOSCOPY    Back pain     Diverticulitis, colon 09/2012    Erectile dysfunction     GERD (gastroesophageal reflux disease)     GI (gastrointestinal bleed)     History of anemia     HAD BLOOD TRANSFUSION 5/2022, TAKING IRON    History of transfusion     Hypertension     Mild renal insufficiency 04/13/2018    OVERVIEW:  Noted 4/13/2018 at [facility] during AAA surgery    Nonrheumatic aortic valve regurgitation     stated in 11- [facility] notes    Postoperative atrial fibrillation 11/04/2022    Psoriasis     Scaling of skin     USES CREAM PRN, BUTTOCKS AND KNEES    Schatzki's ring     SOME TROUBLE SWALLOWING OCCASIONALLY, LAST EGD 5/2022    Spinal stenosis     Stage 3 chronic kidney disease        Family History   Problem Relation Age of Onset    Thyroid cancer Mother     Cancer Mother     Cancer Father     Stroke Father     Arthritis Sister     Arthritis Sister     Malig Hyperthermia Neg Hx        Social History     Socioeconomic History    Marital status:    Tobacco Use    Smoking status: Never     Passive exposure: Yes    Smokeless tobacco: Never    Tobacco comments:     OCCASIONAL CIGAR     HX PIPE USE BUT STOPPED OVER 25 YEARS AGO   Vaping Use    Vaping status: Never Used    Substance and Sexual Activity    Alcohol use: No    Drug use: No    Sexual activity: Not Currently     Partners: Female     Birth control/protection: None       Allergies   Allergen Reactions    Niacin Dizziness    Spironolactone Nausea Only     Other reaction(s): Headache    Statins Myalgia       Past Surgical History:   Procedure Laterality Date    ABDOMINAL AORTIC ANEURYSM REPAIR  2018    AORTIC VALVE REPAIR/REPLACEMENT  11/02/2022    ASCENDING AORTIC ANEURYSM REPAIR W/ MECHANICAL AORTIC VALVE REPLACEMENT N/A 11/02/2022    Procedure: INTRAOPERATIVE LIN; STERNOTOMY; AORTIC VALVE REPLACEMENT; ASCENDING AORTIC ANEURYSM REPAIR; ROOT REDUCTION WITH NEURO MONITORING AND CIRC ARREST; PRP.;  Surgeon: Henrique Louie MD;  Location: [facility] CVOR;  Service: Cardiothoracic;  Laterality: N/A;    CARDIAC CATHETERIZATION Left 04/21/2022    Procedure: Coronary Angiography;  Surgeon: Magui Vega MD;  Location: [facility] CATH INVASIVE LOCATION;  Service: Cardiology;  Laterality: Left;    CARDIAC CATHETERIZATION N/A 04/21/2022    Procedure: Left Heart Cath;  Surgeon: Magui Vega MD;  Location: [facility] CATH INVASIVE LOCATION;  Service: Cardiology;  Laterality: N/A;    CARDIAC VALVE REPLACEMENT  11/02/2022    COLONOSCOPY  2013    negative per pt    COLONOSCOPY N/A 05/09/2022    Procedure: COLONOSCOPY to terminal ileum with APC;  Surgeon: Gato Mccarthy MD;  Location: [facility] ENDOSCOPY;  Service: Gastroenterology;  Laterality: N/A;  pre - anemia  post - diverticulosis, hemorrhoids, cecal avm,     ENDOSCOPY N/A 05/09/2022    Procedure: ESOPHAGOGASTRODUODENOSCOPY;  Surgeon: Gato Mccarthy MD;  Location: [facility] ENDOSCOPY;  Service: Gastroenterology;  Laterality: N/A;  pre - anemia  post - hiatal hernia, schatzki ring    ENDOSCOPY N/A 11/07/2022    Procedure: ESOPHAGOGASTRODUODENOSCOPY with biopsies;  Surgeon: Sobeida Rios MD;  Location: [facility] ENDOSCOPY;  Service: Gastroenterology;  Laterality: N/A;  Pre:  dysphagia  Post: schatzkis ring, gastritis, irregular z-line, abnormal motility of esophagus    RENAL ARTERY ENDARTERECTOMY Bilateral 04/02/2018    Done at [facility] along with aortic patch    UPPER GASTROINTESTINAL ENDOSCOPY           ECG 12 Lead    Date/Time: 4/4/2025 5:20 PM  Performed by: Magui Vega MD    Authorized by: Magui Vega MD  Comparison: compared with previous ECG from 3/31/2025  Similar to previous ECG  Rhythm: sinus rhythm  Rate: normal  Conduction: left bundle branch block  T inversion: V6, V5 and V4  QRS axis: left    Clinical impression: abnormal EKG  Comments: Chronic LBBB with chronic TWI lateral leads           Objective:         PHYSICAL EXAM:  GEN: VSS, no distress,   Eyes: normal sclera, normal lids and lashes  HENT: moist mucus membranes,   Respiratory: CTAB, no rales or wheezes  CV: RRR, loud blowing 4 out of 6 aortic insufficiency murmur throughout the precordium radiating into the abdomen and carotids, +2 DP and 2+ carotid pulses b/l  GI: NABS, soft,  Nontender, nondistended  MSK: no edema, no scoliosis or kyphosis  Skin: no rash, warm, dry  Heme/Lymph: no bruising or bleeding  Psych: organized thought, normal behavior and affect  Neuro: Cranial nerves grossly intact, Alert and Oriented x 3.         Assessment:          Diagnosis Plan   1. LBBB (left bundle branch block)           Plan:         1.  Severe AI: Status post AVR with aortic root repair/replacement 2022.   2.  CAD: Ostial diagonal disease.  Medically managed.  CCS class I symptoms  3.  Peripheral arterial disease: Extensive AAA repair with iliac end-to-end anastomosis. Repeat imaging in 2022 shows no significant lower extremity disease   4.  CKD stage III: Seenataliia Gómez.  Currently on lisinopril 5 twice daily.  Monitor pressures.  Continue.  5.  Hypertension: Controlled on lisinopril 10 twice daily and amlodipine 5.  6. LBBB. His EKG is not newly abnormal, the absence of r wave in V2 is most likely related  to lead placement. No angina.     Dr. Brizuela and Dr. Louie, thank you very much for referring this kind patient to me. Please call me with any questions or concerns. I will see the patient again in 6 months      Magui Vega MD  04/04/25  [facility]    Outpatient Encounter Medications as of 4/4/2025   Medication Sig Dispense Refill    amLODIPine (NORVASC) 5 MG tablet TAKE 1 TABLET EVERY DAY 90 tablet 3    aspirin 81 MG EC tablet Take 1 tablet by mouth Daily.      betamethasone dipropionate (DIPROLENE) 0.05 % lotion APPLY  TOPICALLY TO THE APPROPRIATE AREA AS DIRECTED TWICE DAILY (Patient taking differently: Apply 1 application  topically to the appropriate area as directed 2 (Two) Times a Day As Needed.) 60 mL 2    ezetimibe (ZETIA) 10 MG tablet Take 1 tablet by mouth Daily. 90 tablet 3    ferrous gluconate (FERGON) 324 MG tablet Take 1 tablet by mouth Daily With Breakfast.      finasteride (PROSCAR) 5 MG tablet TAKE 1 TABLET EVERY DAY 90 tablet 3    ketoconazole (NIZORAL) 2 % cream Apply 1 Application topically to the appropriate area as directed Daily As Needed.      lisinopril (PRINIVIL,ZESTRIL) 10 MG tablet TAKE 1 TABLET TWICE DAILY 180 tablet 3    Misc Natural Products (GLUCOSAMINE CHOND COMPLEX/MSM PO) Take 1,500 mg by mouth.      Omega-3 Krill Oil 1000 MG capsule Take 1,000 mg by mouth Daily. PT HOLDING FOR SURGERY       No facility-administered encounter medications on file as of 4/4/2025.

## 2025-06-26 RX ORDER — LISINOPRIL 10 MG/1
10 TABLET ORAL 2 TIMES DAILY
Qty: 180 TABLET | Refills: 1 | Status: SHIPPED | OUTPATIENT
Start: 2025-06-26

## 2025-06-26 RX ORDER — LISINOPRIL 10 MG/1
10 TABLET ORAL 2 TIMES DAILY
Qty: 180 TABLET | Refills: 3 | OUTPATIENT
Start: 2025-06-26

## 2025-06-26 NOTE — TELEPHONE ENCOUNTER
Pt called in requesting refill on this medication to go to [facility] pharmacy.    Cheli ZUNIGA RN  [place] [facility] [place]26/25 15:06 EDT

## 2025-07-21 DIAGNOSIS — Z98.890 S/P ASCENDING AORTIC ANEURYSM REPAIR: Primary | ICD-10-CM

## 2025-07-21 DIAGNOSIS — Z86.79 S/P ASCENDING AORTIC ANEURYSM REPAIR: Primary | ICD-10-CM

## 2025-07-21 DIAGNOSIS — Z95.2 S/P AVR (AORTIC VALVE REPLACEMENT): ICD-10-CM

## (undated) DEVICE — SPNG GZ WOVN 4X4IN 12PLY 10/BX STRL

## (undated) DEVICE — SUT SILK 0 CT1 CR8 18IN C021D

## (undated) DEVICE — SENSR CERBRL O2 PK/2

## (undated) DEVICE — CANN VESL CORNRY STR W/4MM BALN

## (undated) DEVICE — DRP SLUSH WARMR MACH RECTG 66X44IN

## (undated) DEVICE — CATH DIAG IMPULSE FL4 5F 100CM

## (undated) DEVICE — DECANTER BAG 9": Brand: MEDLINE INDUSTRIES, INC.

## (undated) DEVICE — CATH VENT MIV RADL PIG ST TIP 5F 110CM

## (undated) DEVICE — PREP SOL POVIDONE/IODINE BT 4OZ

## (undated) DEVICE — Device

## (undated) DEVICE — GLV SURG BIOGEL LTX PF 7 1/2

## (undated) DEVICE — GW EMR FIX EXCHG J STD .035 3MM 260CM

## (undated) DEVICE — LABEL SHEET CUSTOM 2X2 YELLOW: Brand: MEDLINE INDUSTRIES, INC.

## (undated) DEVICE — ADAPT CLN BIOGUARD AIR/H2O DISP

## (undated) DEVICE — ST TOURNI COMPL A/ 7IN

## (undated) DEVICE — TUBING, SUCTION, 1/4" X 10', STRAIGHT: Brand: MEDLINE

## (undated) DEVICE — RADIFOCUS GLIDEWIRE: Brand: GLIDEWIRE

## (undated) DEVICE — CANN VESL CORNRY STR W/6MM BALN

## (undated) DEVICE — DRSNG SURESITE WNDW 4X4.5

## (undated) DEVICE — SUT PROLN 4/0 V5 36IN 8935H

## (undated) DEVICE — SUT PROLN 3/0 V7 D/A 36IN 8976H

## (undated) DEVICE — SENSR O2 OXIMAX FNGR A/ 18IN NONSTR

## (undated) DEVICE — OPTIFOAM GENTLE SA, POSTOP, 4X12: Brand: MEDLINE

## (undated) DEVICE — 28 FR RIGHT ANGLE – SOFT PVC CATHETER: Brand: PVC THORACIC CATHETERS

## (undated) DEVICE — SUT PROLN 4/0 BB D/A 36IN 8581H

## (undated) DEVICE — DRESSING, SURESITE SELECT, 2.8'X3.50': Brand: MEDLINE

## (undated) DEVICE — CATH DIAG IMPULSE FL5 5F 100CM

## (undated) DEVICE — ERBE NESSY®PLATE 170 SPLIT; 168CM²; CABLE 3M: Brand: ERBE

## (undated) DEVICE — GLIDESHEATH SLENDER STAINLESS STEEL KIT: Brand: GLIDESHEATH SLENDER

## (undated) DEVICE — TEMP PACING WIRE: Brand: MYO/WIRE

## (undated) DEVICE — GW HITORQUE/BAL MID/WT J W/HCOAT .014 3X190CM

## (undated) DEVICE — HEMOCONCENTRATOR PERFUS LPS06

## (undated) DEVICE — PK HEART OPN 40

## (undated) DEVICE — FRCP BX RADJAW4 NDL 2.8 240CM LG OG BX40

## (undated) DEVICE — DRAIN,WOUND,ROUND,24FR,5/16",FULL-FLUTED: Brand: MEDLINE

## (undated) DEVICE — SOL ISO/ALC RUB 70PCT 4OZ

## (undated) DEVICE — CONTAINER,SPECIMEN,OR STERILE,4OZ: Brand: MEDLINE

## (undated) DEVICE — PK PERFUS CUST W/CARDIOPLEGIA

## (undated) DEVICE — MARKR SKIN W/RULR AND LBL

## (undated) DEVICE — CATH VENT DLP W/CONN MALL NOVNT SILICON 16FR 16IN

## (undated) DEVICE — ST. SORBAVIEW ULTIMATE IJ SYSTEM A,C: Brand: CENTURION

## (undated) DEVICE — MSK PROC CURAPLEX O2 2/ADAPT 7FT

## (undated) DEVICE — CANN AORT ROOT DLP VNT/8IN 14G 7F

## (undated) DEVICE — KT ORCA ORCAPOD DISP STRL

## (undated) DEVICE — SUT SILK 2/0 SH CR8 18IN CR8 C012D

## (undated) DEVICE — SUT SILK 2 SUTUPAK TIE 60IN SA8H 2STRAND

## (undated) DEVICE — SPONGE,DISSECTOR,K,XRAY,9/16"X1/4",STRL: Brand: MEDLINE

## (undated) DEVICE — CLAMP INSERT: Brand: STEALTH® CLAMP INSERT

## (undated) DEVICE — SYS PERFUS SEP PLATLT W TIPS CUST

## (undated) DEVICE — ADAPT ANTEGRADE RETRGR

## (undated) DEVICE — 28 FR STRAIGHT – SOFT PVC CATHETER: Brand: PVC THORACIC CATHETERS

## (undated) DEVICE — LP VESL MAXI 2.5X1MM RED 2PK

## (undated) DEVICE — ORGANIZER SUT SHELIGH 3T 213013

## (undated) DEVICE — MASK,OXY,ADLT,NON-REB,SAFETY VENT,7,UC: Brand: MEDLINE

## (undated) DEVICE — OASIS DRAIN, SINGLE, INLINE & ATS COMPATIBLE: Brand: OASIS

## (undated) DEVICE — PK ATS CUST W CARDIOTOMY RESEVOIR

## (undated) DEVICE — SUT SILK 2/0 TIES 18IN A185H

## (undated) DEVICE — CANN RETRGR STYLET RSCP 15F

## (undated) DEVICE — TBG ART PRESS 60 IN

## (undated) DEVICE — LN SMPL CO2 SHTRM SD STREAM W/M LUER

## (undated) DEVICE — APC PROBE 2200 A, SINGLE USE OD 2.3MM/6.9FR; L. 2.2M/7.2FT: Brand: ERBE

## (undated) DEVICE — REDUCING CONN CANN/PUMP 3/8X1/4

## (undated) DEVICE — BIOPATCH™ ANTIMICROBIAL DRESSING WITH CHLORHEXIDINE GLUCONATE IS A HYDROPHILLIC POLYURETHANE ABSORPTIVE FOAM WITH CHLORHEXIDINE GLUCONATE (CHG) WHICH INHIBITS BACTERIAL GROWTH UNDER THE DRESSING. THE DRESSING IS INTENDED TO BE USED TO ABSORB EXUDATE, COVER A WOUND CAUSED BY VASCULAR AND NONVASCULAR PERCUTANEOUS MEDICAL DEVICES DURING SURGERY, AS WELL AS REDUCE LOCAL INFECTION AND COLONIZATION OF MICROORGANISMS.: Brand: BIOPATCH

## (undated) DEVICE — PK CATH CARD 40

## (undated) DEVICE — SUT PROLN 6/0 RB2 D/A 30IN 8711H

## (undated) DEVICE — CATH DIAG IMPULSE FR4 5F 100CM

## (undated) DEVICE — BG TRANSF W/COUPLER SPK 600ML

## (undated) DEVICE — BITEBLOCK OMNI BLOC

## (undated) DEVICE — TR BAND RADIAL ARTERY COMPRESSION DEVICE: Brand: TR BAND

## (undated) DEVICE — CATH DIAG IMPULSE FL3.5 5F 100CM

## (undated) DEVICE — KT MANIFLD CARDIAC

## (undated) DEVICE — SUT PROLN 5/0 V5 36IN 8934H

## (undated) DEVICE — INTRO SHEATH ART/FEM ENGAGE .035 5F12CM

## (undated) DEVICE — 8 FOOT DISPOSABLE EXTENSION CABLE WITH SAFE CONNECT / ALLIGATOR CLIP